# Patient Record
Sex: MALE | Race: WHITE | NOT HISPANIC OR LATINO | Employment: UNEMPLOYED | URBAN - METROPOLITAN AREA
[De-identification: names, ages, dates, MRNs, and addresses within clinical notes are randomized per-mention and may not be internally consistent; named-entity substitution may affect disease eponyms.]

---

## 2021-04-12 ENCOUNTER — OFFICE VISIT (OUTPATIENT)
Dept: INTERNAL MEDICINE CLINIC | Facility: CLINIC | Age: 21
End: 2021-04-12
Payer: COMMERCIAL

## 2021-04-12 VITALS
HEIGHT: 68 IN | BODY MASS INDEX: 23.28 KG/M2 | OXYGEN SATURATION: 96 % | TEMPERATURE: 99.2 F | SYSTOLIC BLOOD PRESSURE: 120 MMHG | WEIGHT: 153.6 LBS | HEART RATE: 97 BPM | DIASTOLIC BLOOD PRESSURE: 80 MMHG

## 2021-04-12 DIAGNOSIS — G44.219 EPISODIC TENSION-TYPE HEADACHE, NOT INTRACTABLE: Primary | ICD-10-CM

## 2021-04-12 DIAGNOSIS — R51.9 SHARP HEADACHE: ICD-10-CM

## 2021-04-12 DIAGNOSIS — R41.89 BRAIN FOG: ICD-10-CM

## 2021-04-12 PROCEDURE — 99203 OFFICE O/P NEW LOW 30 MIN: CPT | Performed by: NURSE PRACTITIONER

## 2021-04-12 PROCEDURE — 3008F BODY MASS INDEX DOCD: CPT | Performed by: NURSE PRACTITIONER

## 2021-04-12 PROCEDURE — 3725F SCREEN DEPRESSION PERFORMED: CPT | Performed by: NURSE PRACTITIONER

## 2021-04-12 PROCEDURE — 1036F TOBACCO NON-USER: CPT | Performed by: NURSE PRACTITIONER

## 2021-04-12 RX ORDER — TRIAMCINOLONE ACETONIDE 5 MG/G
CREAM TOPICAL
COMMUNITY
Start: 2021-03-12

## 2021-04-12 NOTE — ASSESSMENT & PLAN NOTE
Advised to keep a headache diary  Ok to take tylenol as needed  Stay well hydrated, get adequate sleep, avoid excessive screen time, avoid caffeine, eat a healthy diet  [facility]ed [facility], labs were normal, awaiting TSH  Due to worsening headaches with new symptoms, recommend CT scan and referral to neurology

## 2021-04-12 NOTE — PROGRESS NOTES
Assessment/Plan:    Episodic tension-type headache, not intractable  Advised to keep a headache diary  Ok to take tylenol as needed  Stay well hydrated, get adequate sleep, avoid excessive screen time, avoid caffeine, eat a healthy diet  [facility]ed [facility], labs were normal, awaiting TSH  Due to worsening headaches with new symptoms, recommend CT scan and referral to neurology  Diagnoses and all orders for this visit:    Episodic tension-type headache, not intractable  -     Ambulatory referral to Neurology; Future  -     CT head wo contrast; Future    Sharp headache  -     Ambulatory referral to Neurology; Future  -     CT head wo contrast; Future    Brain fog  -     Ambulatory referral to Neurology; Future  -     CT head wo contrast; Future    Other orders  -     triamcinolone (KENALOG) 0 5 % cream; APPLY EXTERNALLY TO THE AFFECTED AREA TWICE DAILY FOR 7 DAYS  -     mupirocin (BACTROBAN) 2 % ointment          Subjective:      Patient ID: Silvia Aquion is a 24 y o  male  Here today to establish care  Melisa Sy is accompanied by his father  He reports having a pressure on the top of his head intermittently over the last 5 months  He feels something if 'off'  He gets a pressure that radiates to the back of his head  He gets an achy feeling in his shoulders  He sometimes gets a shooting sensation from the back of his head upward   The episodes last about a day or so but they seem to be getting worse  The pain is worse and they are occurring more often   The last episode was last week which lasted 4 days  He went to his pediatrician a few months ago who tested for covid which was negative  [facility]  He had labs done  They recommended seeing neurology and getting a CT scan which they ordered  He denies any vision changes, nausea, vomiting, photophobia, or speech/coordination issues             The following portions of the patient's history were reviewed and updated as appropriate: allergies, current medications, past family history, past medical history, past social history, past surgical history and problem list     Review of Systems   Constitutional: Negative for activity change, appetite change, fatigue and fever  HENT: Negative for congestion, postnasal drip, rhinorrhea, sinus pressure and sore throat  Eyes: Negative for photophobia and visual disturbance  Respiratory: Negative for cough and shortness of breath  Cardiovascular: Negative for chest pain and palpitations  Gastrointestinal: Negative for abdominal pain, diarrhea, nausea and vomiting  Genitourinary: Negative for difficulty urinating  Musculoskeletal: Positive for neck pain  Skin: Negative for rash  Neurological: Positive for headaches  Negative for dizziness, syncope, facial asymmetry, speech difficulty, weakness, light-headedness and numbness  Psychiatric/Behavioral: Negative for sleep disturbance  The patient is not nervous/anxious  Objective:      /80   Pulse 97   Temp 99 2 °F (37 3 °C)   Ht 5' 7 5" (1 715 m)   Wt 69 7 kg (153 lb 9 6 oz)   SpO2 96%   BMI 23 70 kg/m²          Physical Exam  Vitals signs reviewed  Constitutional:       Appearance: Normal appearance  HENT:      Head: Normocephalic and atraumatic  Right Ear: Tympanic membrane, ear canal and external ear normal       Left Ear: Tympanic membrane, ear canal and external ear normal    Eyes:      Conjunctiva/sclera: Conjunctivae normal       Pupils: Pupils are equal, round, and reactive to light  Neck:      Musculoskeletal: Neck supple  Thyroid: No thyromegaly  Cardiovascular:      Rate and Rhythm: Normal rate and regular rhythm  Heart sounds: Normal heart sounds  Pulmonary:      Effort: Pulmonary effort is normal       Breath sounds: Normal breath sounds  Musculoskeletal: Normal range of motion        Cervical back: He exhibits normal range of motion, no tenderness and no pain    Lymphadenopathy:      Cervical: No cervical adenopathy  Skin:     General: Skin is warm and dry  Neurological:      General: No focal deficit present  Mental Status: He is alert and oriented to person, place, and time  Cranial Nerves: No cranial nerve deficit  Coordination: Coordination normal    Psychiatric:         Mood and Affect: Mood normal          Behavior: Behavior normal        [facility] with patient and father   Labs were normal

## 2021-04-21 ENCOUNTER — TELEPHONE (OUTPATIENT)
Dept: INTERNAL MEDICINE CLINIC | Facility: CLINIC | Age: 21
End: 2021-04-21

## 2021-04-21 NOTE — TELEPHONE ENCOUNTER
Spoke with provider and patient advised of the following :     Advised to keep a headache diary  Ok to take tylenol as needed  Stay well hydrated, get adequate sleep, avoid excessive screen time, avoid caffeine, eat a healthy diet  Recommend to schedule with neurology

## 2021-04-21 NOTE — TELEPHONE ENCOUNTER
CT of Head w/o Contrast     This case cannot be approved based on clinical information received  Please upload additional clinical documents if available  If your physician would like a peer to peer consult, call [phone]  All clinical documentation we have has already been sent      Patients test is scheduled for tomorrow , please advise how to continue

## 2021-06-23 ENCOUNTER — OFFICE VISIT (OUTPATIENT)
Dept: INTERNAL MEDICINE CLINIC | Facility: CLINIC | Age: 21
End: 2021-06-23
Payer: COMMERCIAL

## 2021-06-23 VITALS
WEIGHT: 159.8 LBS | BODY MASS INDEX: 24.22 KG/M2 | SYSTOLIC BLOOD PRESSURE: 118 MMHG | DIASTOLIC BLOOD PRESSURE: 76 MMHG | OXYGEN SATURATION: 95 % | HEIGHT: 68 IN | HEART RATE: 85 BPM | TEMPERATURE: 97.4 F

## 2021-06-23 DIAGNOSIS — Z00.00 ANNUAL PHYSICAL EXAM: Primary | ICD-10-CM

## 2021-06-23 LAB — SEVERITY (EYE EXAM): NORMAL

## 2021-06-23 PROCEDURE — 3008F BODY MASS INDEX DOCD: CPT | Performed by: NURSE PRACTITIONER

## 2021-06-23 PROCEDURE — 99395 PREV VISIT EST AGE 18-39: CPT | Performed by: NURSE PRACTITIONER

## 2021-06-23 PROCEDURE — 1036F TOBACCO NON-USER: CPT | Performed by: NURSE PRACTITIONER

## 2021-06-23 NOTE — PROGRESS NOTES
[address] [facility] INTER[facility] MEDICINE    NAME: Kasandra Shabazz  AGE: 24 y o  SEX: male  : 2000     DATE: 2021     Assessment and Plan:     Problem List Items Addressed This Visit     None      Visit Diagnoses     Annual physical exam    -  Primary    Relevant Orders    Visual acuity screening          Immunizations and preventive care screenings were discussed with patient today  Appropriate education was printed on patient's after visit summary  Counseling:  Injury prevention: discussed safety/seat belts, safety helmets, smoke detectors, carbon dioxide detectors, and smoking near bedding or upholstery  · Exercise: the importance of regular exercise/physical activity was discussed  Recommend exercise 3-5 times per week for at least 30 minutes  Return in about 1 year (around 2022) for Annual physical      Chief Complaint:     Chief Complaint   Patient presents with    Annual Exam      History of Present Illness:     Adult Annual Physical   Patient here for a comprehensive physical exam  The patient reports no problems  Here for his drivers permit physical      Diet and Physical Activity  · Diet/Nutrition: well balanced diet  · Exercise: walking  Depression Screening  PHQ-9 Depression Screening    PHQ-9:   Frequency of the following problems over the past two weeks:           General Health  · Sleep: sleeps well  · Hearing: normal - none   · Vision: no vision problems  · Dental: regular dental visits  Review of Systems:     Review of Systems   Constitutional: Negative for activity change, appetite change, fatigue and unexpected weight change  Eyes: Negative for visual disturbance  Respiratory: Negative for cough, chest tightness and shortness of breath  Cardiovascular: Negative for chest pain, palpitations and leg swelling  Gastrointestinal: Negative for abdominal pain, constipation and diarrhea  Genitourinary: Negative for difficulty urinating  Musculoskeletal: Negative for arthralgias  Skin: Negative for rash  Allergic/Immunologic: Negative for environmental allergies  Neurological: Negative for dizziness, weakness, light-headedness and headaches  Psychiatric/Behavioral: Negative for sleep disturbance  Past Medical History:     Past Medical History:   Diagnosis Date    Concussion 03/24/2015    Fatigue 02/23/2017    GERD (gastroesophageal reflux disease)     as a child    Gynecomastia 11/10/2017    Hiatal hernia     as a child    [facility] 02/23/2017      Past Surgical History:     History reviewed  No pertinent surgical history  Social History:     Social History     Socioeconomic History    Marital status: Unknown     Spouse name: None    Number of children: None    Years of education: None    Highest education level: None   Occupational History    None   Tobacco Use    Smoking status: Never Smoker    Smokeless tobacco: Never Used    Tobacco comment: mother smoked outside of home   Vaping Use    Vaping Use: Never used   Substance and Sexual Activity    Alcohol use: Never    Drug use: None    Sexual activity: None   Other Topics Concern    None   Social History Narrative    None     Social Determinants of Health     Financial Resource Strain:     Difficulty of Paying Living Expenses:    Food Insecurity:     Worried About Running Out of Food in the Last Year:     Ran Out of Food in the Last Year:    Transportation Needs:     Lack of Transportation (Medical):      Lack of Transportation (Non-Medical):    Physical Activity:     Days of Exercise per Week:     Minutes of Exercise per Session:    Stress:     Feeling of Stress :    Social Connections:     Frequency of Communication with Friends and Family:     Frequency of Social Gatherings with Friends and Family:     Attends [employer] Services:     Active Member of Clubs or Organizations:     Attends Club or Organization Meetings:     Marital Status:    Intimate Partner Violence:     Fear of Current or Ex-Partner:     Emotionally Abused:     Physically Abused:     Sexually Abused:       Family History:     Family History   Problem Relation Age of Onset    Hypertension Father     Bipolar disorder Mother       Current Medications:     Current Outpatient Medications   Medication Sig Dispense Refill    mupirocin (BACTROBAN) 2 % ointment       triamcinolone (KENALOG) 0 5 % cream APPLY EXTERNALLY TO THE AFFECTED AREA TWICE DAILY FOR 7 DAYS       No current facility-administered medications for this visit  Allergies:     No Known Allergies   Physical Exam:     /76   Pulse 85   Temp (!) 97 4 °F (36 3 °C)   Ht 5' 7 5" (1 715 m)   Wt 72 5 kg (159 lb 12 8 oz)   SpO2 95%   BMI 24 66 kg/m²     Physical Exam  Vitals reviewed  Constitutional:       Appearance: He is well-developed  HENT:      Head: Normocephalic and atraumatic  Right Ear: Tympanic membrane, ear canal and external ear normal       Left Ear: Tympanic membrane, ear canal and external ear normal    Eyes:      Conjunctiva/sclera: Conjunctivae normal    Cardiovascular:      Rate and Rhythm: Normal rate and regular rhythm  Pulmonary:      Effort: Pulmonary effort is normal       Breath sounds: Normal breath sounds  Abdominal:      General: Bowel sounds are normal       Palpations: Abdomen is soft  Tenderness: There is no abdominal tenderness  Musculoskeletal:         General: No swelling  Normal range of motion  Cervical back: Neck supple  Skin:     General: Skin is warm and dry  Neurological:      Mental Status: He is alert and oriented to person, place, and time     Psychiatric:         Mood and Affect: Mood normal          Behavior: Behavior normal           [facility]

## 2021-11-23 ENCOUNTER — TELEPHONE (OUTPATIENT)
Dept: INTERNAL MEDICINE CLINIC | Facility: CLINIC | Age: 21
End: 2021-11-23

## 2021-12-14 ENCOUNTER — TELEPHONE (OUTPATIENT)
Dept: NEUROLOGY | Facility: CLINIC | Age: 21
End: 2021-12-14

## 2021-12-15 ENCOUNTER — CONSULT (OUTPATIENT)
Dept: NEUROLOGY | Facility: CLINIC | Age: 21
End: 2021-12-15
Payer: COMMERCIAL

## 2021-12-15 VITALS
HEART RATE: 70 BPM | BODY MASS INDEX: 20.95 KG/M2 | HEIGHT: 68 IN | TEMPERATURE: 98.2 F | DIASTOLIC BLOOD PRESSURE: 86 MMHG | WEIGHT: 138.2 LBS | SYSTOLIC BLOOD PRESSURE: 130 MMHG

## 2021-12-15 DIAGNOSIS — R07.0 THROAT PAIN: ICD-10-CM

## 2021-12-15 DIAGNOSIS — M54.2 NECK PAIN: Primary | ICD-10-CM

## 2021-12-15 PROCEDURE — 1036F TOBACCO NON-USER: CPT | Performed by: PSYCHIATRY & NEUROLOGY

## 2021-12-15 PROCEDURE — 3008F BODY MASS INDEX DOCD: CPT | Performed by: PSYCHIATRY & NEUROLOGY

## 2021-12-15 PROCEDURE — 99244 OFF/OP CNSLTJ NEW/EST MOD 40: CPT | Performed by: PSYCHIATRY & NEUROLOGY

## 2022-01-03 ENCOUNTER — HOSPITAL ENCOUNTER (OUTPATIENT)
Dept: ULTRASOUND IMAGING | Facility: HOSPITAL | Age: 22
Discharge: HOME | End: 2022-01-03
Payer: COMMERCIAL

## 2022-01-03 DIAGNOSIS — R22.1 LUMP IN NECK: ICD-10-CM

## 2022-01-03 PROCEDURE — 76536 US EXAM OF HEAD AND NECK: CPT

## 2022-01-07 ENCOUNTER — APPOINTMENT (EMERGENCY)
Dept: RADIOLOGY | Facility: HOSPITAL | Age: 22
End: 2022-01-07
Payer: COMMERCIAL

## 2022-01-07 ENCOUNTER — HOSPITAL ENCOUNTER (EMERGENCY)
Facility: HOSPITAL | Age: 22
Discharge: HOME | End: 2022-01-07
Attending: EMERGENCY MEDICINE
Payer: COMMERCIAL

## 2022-01-07 VITALS
SYSTOLIC BLOOD PRESSURE: 125 MMHG | OXYGEN SATURATION: 96 % | HEART RATE: 122 BPM | TEMPERATURE: 100.4 F | DIASTOLIC BLOOD PRESSURE: 73 MMHG | RESPIRATION RATE: 20 BRPM

## 2022-01-07 DIAGNOSIS — U07.1 COVID-19 VIRUS INFECTION: ICD-10-CM

## 2022-01-07 DIAGNOSIS — R50.9 FEVER: ICD-10-CM

## 2022-01-07 DIAGNOSIS — T78.3XXA ANGIOEDEMA OF LIPS: Primary | ICD-10-CM

## 2022-01-07 LAB
ALBUMIN SERPL BCP-MCNC: 3.9 G/DL (ref 3.5–5)
ALP SERPL-CCNC: 55 U/L (ref 46–116)
ALT SERPL W P-5'-P-CCNC: 19 U/L (ref 12–78)
ANION GAP SERPL CALCULATED.3IONS-SCNC: 12 MMOL/L (ref 4–13)
AST SERPL W P-5'-P-CCNC: 18 U/L (ref 5–45)
ATRIAL RATE: 107 BPM
BASOPHILS # BLD AUTO: 0.03 THOUSANDS/ΜL (ref 0–0.1)
BASOPHILS NFR BLD AUTO: 0 % (ref 0–1)
BILIRUB SERPL-MCNC: 0.61 MG/DL (ref 0.2–1)
BUN SERPL-MCNC: 20 MG/DL (ref 5–25)
CALCIUM SERPL-MCNC: 9 MG/DL (ref 8.3–10.1)
CHLORIDE SERPL-SCNC: 106 MMOL/L (ref 100–108)
CO2 SERPL-SCNC: 22 MMOL/L (ref 21–32)
CREAT SERPL-MCNC: 1.18 MG/DL (ref 0.6–1.3)
EOSINOPHIL # BLD AUTO: 0.1 THOUSAND/ΜL (ref 0–0.61)
EOSINOPHIL NFR BLD AUTO: 1 % (ref 0–6)
ERYTHROCYTE [DISTWIDTH] IN BLOOD BY AUTOMATED COUNT: 10.7 % (ref 11.6–15.1)
FLUAV RNA RESP QL NAA+PROBE: NEGATIVE
FLUBV RNA RESP QL NAA+PROBE: NEGATIVE
GFR SERPL CREATININE-BSD FRML MDRD: 88 ML/MIN/1.73SQ M
GLUCOSE SERPL-MCNC: 97 MG/DL (ref 65–140)
HCT VFR BLD AUTO: 44.9 % (ref 36.5–49.3)
HGB BLD-MCNC: 15.2 G/DL (ref 12–17)
IMM GRANULOCYTES # BLD AUTO: 0.04 THOUSAND/UL (ref 0–0.2)
IMM GRANULOCYTES NFR BLD AUTO: 1 % (ref 0–2)
LACTATE SERPL-SCNC: 1.7 MMOL/L (ref 0.5–2)
LYMPHOCYTES # BLD AUTO: 0.41 THOUSANDS/ΜL (ref 0.6–4.47)
LYMPHOCYTES NFR BLD AUTO: 5 % (ref 14–44)
MCH RBC QN AUTO: 31.5 PG (ref 26.8–34.3)
MCHC RBC AUTO-ENTMCNC: 33.9 G/DL (ref 31.4–37.4)
MCV RBC AUTO: 93 FL (ref 82–98)
MONOCYTES # BLD AUTO: 0.98 THOUSAND/ΜL (ref 0.17–1.22)
MONOCYTES NFR BLD AUTO: 12 % (ref 4–12)
NEUTROPHILS # BLD AUTO: 6.87 THOUSANDS/ΜL (ref 1.85–7.62)
NEUTS SEG NFR BLD AUTO: 81 % (ref 43–75)
NRBC BLD AUTO-RTO: 0 /100 WBCS
P AXIS: 65 DEGREES
PLATELET # BLD AUTO: 264 THOUSANDS/UL (ref 149–390)
PMV BLD AUTO: 8.1 FL (ref 8.9–12.7)
POTASSIUM SERPL-SCNC: 3.2 MMOL/L (ref 3.5–5.3)
PR INTERVAL: 130 MS
PROT SERPL-MCNC: 7.2 G/DL (ref 6.4–8.2)
QRS AXIS: 87 DEGREES
QRSD INTERVAL: 70 MS
QT INTERVAL: 304 MS
QTC INTERVAL: 405 MS
RBC # BLD AUTO: 4.83 MILLION/UL (ref 3.88–5.62)
RSV RNA RESP QL NAA+PROBE: NEGATIVE
SARS-COV-2 RNA RESP QL NAA+PROBE: POSITIVE
SODIUM SERPL-SCNC: 140 MMOL/L (ref 136–145)
T WAVE AXIS: 58 DEGREES
VENTRICULAR RATE: 107 BPM
WBC # BLD AUTO: 8.43 THOUSAND/UL (ref 4.31–10.16)

## 2022-01-07 PROCEDURE — 99284 EMERGENCY DEPT VISIT MOD MDM: CPT

## 2022-01-07 PROCEDURE — 80053 COMPREHEN METABOLIC PANEL: CPT

## 2022-01-07 PROCEDURE — 93005 ELECTROCARDIOGRAM TRACING: CPT

## 2022-01-07 PROCEDURE — 36415 COLL VENOUS BLD VENIPUNCTURE: CPT

## 2022-01-07 PROCEDURE — 87040 BLOOD CULTURE FOR BACTERIA: CPT

## 2022-01-07 PROCEDURE — 96375 TX/PRO/DX INJ NEW DRUG ADDON: CPT

## 2022-01-07 PROCEDURE — 96361 HYDRATE IV INFUSION ADD-ON: CPT

## 2022-01-07 PROCEDURE — 71045 X-RAY EXAM CHEST 1 VIEW: CPT

## 2022-01-07 PROCEDURE — 96374 THER/PROPH/DIAG INJ IV PUSH: CPT

## 2022-01-07 PROCEDURE — 99285 EMERGENCY DEPT VISIT HI MDM: CPT | Performed by: EMERGENCY MEDICINE

## 2022-01-07 PROCEDURE — 93010 ELECTROCARDIOGRAM REPORT: CPT | Performed by: INTERNAL MEDICINE

## 2022-01-07 PROCEDURE — 0241U HB NFCT DS VIR RESP RNA 4 TRGT: CPT

## 2022-01-07 PROCEDURE — 83605 ASSAY OF LACTIC ACID: CPT

## 2022-01-07 PROCEDURE — 85025 COMPLETE CBC W/AUTO DIFF WBC: CPT

## 2022-01-07 RX ORDER — MULTIVIT WITH MINERALS/LUTEIN
1000 TABLET ORAL 2 TIMES DAILY
Qty: 28 TABLET | Refills: 0 | Status: SHIPPED | OUTPATIENT
Start: 2022-01-07 | End: 2022-01-21

## 2022-01-07 RX ORDER — PREDNISONE 20 MG/1
60 TABLET ORAL DAILY
Qty: 15 TABLET | Refills: 0 | Status: SHIPPED | OUTPATIENT
Start: 2022-01-07 | End: 2022-01-12

## 2022-01-07 RX ORDER — POTASSIUM CHLORIDE 20 MEQ/1
20 TABLET, EXTENDED RELEASE ORAL ONCE
Status: COMPLETED | OUTPATIENT
Start: 2022-01-07 | End: 2022-01-07

## 2022-01-07 RX ORDER — MELATONIN
2000 DAILY
Qty: 14 TABLET | Refills: 0 | Status: SHIPPED | OUTPATIENT
Start: 2022-01-07 | End: 2022-01-21

## 2022-01-07 RX ORDER — METHYLPREDNISOLONE SODIUM SUCCINATE 125 MG/2ML
125 INJECTION, POWDER, LYOPHILIZED, FOR SOLUTION INTRAMUSCULAR; INTRAVENOUS ONCE
Status: COMPLETED | OUTPATIENT
Start: 2022-01-07 | End: 2022-01-07

## 2022-01-07 RX ORDER — HYDROXYZINE HYDROCHLORIDE 25 MG/1
25 TABLET, FILM COATED ORAL EVERY 6 HOURS PRN
Qty: 25 TABLET | Refills: 0 | Status: SHIPPED | OUTPATIENT
Start: 2022-01-07 | End: 2022-01-14

## 2022-01-07 RX ORDER — ZINC SULFATE 50(220)MG
220 CAPSULE ORAL DAILY
Qty: 14 CAPSULE | Refills: 0 | Status: SHIPPED | OUTPATIENT
Start: 2022-01-07 | End: 2022-04-28

## 2022-01-07 RX ORDER — EPINEPHRINE 0.3 MG/.3ML
0.1 INJECTION SUBCUTANEOUS ONCE
Qty: 0.2 ML | Refills: 1 | Status: SHIPPED | OUTPATIENT
Start: 2022-01-07 | End: 2022-04-28

## 2022-01-07 RX ORDER — FAMOTIDINE 20 MG/1
20 TABLET, FILM COATED ORAL 2 TIMES DAILY
Qty: 10 TABLET | Refills: 0 | Status: SHIPPED | OUTPATIENT
Start: 2022-01-07 | End: 2022-01-12

## 2022-01-07 RX ORDER — DIPHENHYDRAMINE HYDROCHLORIDE 50 MG/ML
25 INJECTION INTRAMUSCULAR; INTRAVENOUS ONCE
Status: COMPLETED | OUTPATIENT
Start: 2022-01-07 | End: 2022-01-07

## 2022-01-07 RX ADMIN — DIPHENHYDRAMINE HYDROCHLORIDE 25 MG: 50 INJECTION, SOLUTION INTRAMUSCULAR; INTRAVENOUS at 07:07

## 2022-01-07 RX ADMIN — SODIUM CHLORIDE 1000 ML: 0.9 INJECTION, SOLUTION INTRAVENOUS at 07:59

## 2022-01-07 RX ADMIN — METHYLPREDNISOLONE SODIUM SUCCINATE 125 MG: 125 INJECTION, POWDER, FOR SOLUTION INTRAMUSCULAR; INTRAVENOUS at 07:03

## 2022-01-07 RX ADMIN — POTASSIUM CHLORIDE 20 MEQ: 1500 TABLET, EXTENDED RELEASE ORAL at 09:17

## 2022-01-07 RX ADMIN — FAMOTIDINE 20 MG: 10 INJECTION, SOLUTION INTRAVENOUS at 07:06

## 2022-01-07 NOTE — SEPSIS NOTE
Sepsis Note   Hilda Robison  22 y o  male MRN: [id number]  Unit/Bed#: ED 16 Encounter: [id number]       qSOFA     [address] Name 01/07/22 0926                Altered mental status GCS < 15 --        Respiratory Rate > / =47 0        Systolic BP < / =883 0        Q Sofa Score 0                   Initial Sepsis Screening     Row Name 01/07/22 0746                Is the patient's history suggestive of a new or worsening infection? --        Suspected source of infection --        Are two or more of the following signs & symptoms of infection both present and new to the patient? --        Indicate SIRS criteria Tachycardia > 90 bpm  -KF        If the answer is yes to both questions, suspicion of sepsis is present --        If severe sepsis is present AND tissue hypoperfusion perists in the hour after fluid resuscitation or lactate > 4, the patient meets criteria for SEPTIC SHOCK --        Are any of the following organ dysfunction criteria present within 6 hours of suspected infection and SIRS criteria that are NOT considered to be chronic conditions?  --        Organ dysfunction --        Date of presentation of severe sepsis --        Time of presentation of severe sepsis --        Tissue hypoperfusion persists in the hour after crystalloid fluid administration, evidenced, by either: --        Was hypotension present within one hour of the conclusion of crystalloid fluid administration? --        Date of presentation of septic shock --        Time of presentation of septic shock --              User Key  (r) = Recorded By, (t) = Taken By, (c) = Cosigned By    [address] Name Provider Ranjeet Vale MD Resident

## 2022-01-07 NOTE — ED PROVIDER NOTES
History  Chief Complaint   Patient presents with    Lip Swelling     pt took aspirin 2 hours ago and around 30-40 minutes ago started with lip swelling  denies throat complaints/SOB  dad did give 50 mg oral benadryl  took aspiring because he states he had high fevera t home-- thinks he may have covid     HPI    Prior to Admission Medications   Prescriptions Last Dose Informant Patient Reported? Taking?   hydrocortisone 0 5 % cream  Self Yes No   Sig: Apply 1 application topically 2 (two) times a day   mupirocin (BACTROBAN) 2 % ointment   Yes No   triamcinolone (KENALOG) 0 5 % cream   Yes No   Sig: APPLY EXTERNALLY TO THE AFFECTED AREA TWICE DAILY FOR 7 DAYS      Facility-Administered Medications: None       Past Medical History:   Diagnosis Date    Concussion 03/24/2015    Fatigue 02/23/2017    GERD (gastroesophageal reflux disease)     as a child    Gynecomastia 11/10/2017    Hiatal hernia     as a child    Mild depression ([facility] [address] ) 02/23/2017       History reviewed  No pertinent surgical history  Family History   Problem Relation Age of Onset    Hypertension Father     Bipolar disorder Mother      I have reviewed and agree with the history as documented      E-Cigarette/Vaping    E-Cigarette Use Never User      E-Cigarette/Vaping Substances     Social History     Tobacco Use    Smoking status: Never Smoker    Smokeless tobacco: Never Used    Tobacco comment: mother smoked outside of home   Vaping Use    Vaping Use: Never used   Substance Use Topics    Alcohol use: Never    Drug use: Not on file       Review of Systems    Physical Exam  Physical Exam    Vital Signs  ED Triage Vitals   Temperature Pulse Respirations Blood Pressure SpO2   01/07/22 0640 01/07/22 0637 01/07/22 0638 01/07/22 0638 01/07/22 0638   100 4 °F (38 °C) (!) 122 20 125/73 96 %      Temp Source Heart Rate Source Patient Position - Orthostatic VS BP Location FiO2 (%)   01/07/22 0499 01/07/22 6747 01/07/22 5852 01/07/22 5758 --   Oral Monitor Sitting Right arm       Pain Score       --                  Vitals:    01/07/22 0637 01/07/22 0638   BP:  125/73   Pulse: (!) 122 (!) 122   Patient Position - Orthostatic VS:  Sitting         Visual Acuity      ED Medications  Medications   sodium chloride 0 9 % bolus 1,000 mL (1,000 mL Intravenous New Bag 1/7/22 0751)   potassium chloride (K-DUR,KLOR-CON) CR tablet 20 mEq (has no administration in time range)   famotidine (PEPCID) injection 20 mg (20 mg Intravenous Given 1/7/22 0706)   diphenhydrAMINE (BENADRYL) injection 25 mg (25 mg Intravenous Given 1/7/22 0707)   methylPREDNISolone sodium succinate (Solu-MEDROL) injection 125 mg (125 mg Intravenous Given 1/7/22 0703)       Diagnostic Studies  Results Reviewed     Procedure Component Value Units Date/Time    COVID/FLU/RSV - 2 hour TAT [[id number]]  (Abnormal) Collected: 01/07/22 0709    Lab Status: Final result Specimen: Nares from Nose Updated: 01/07/22 0814     SARS-CoV-2 Positive     INFLUENZA A PCR Negative     INFLUENZA B PCR Negative     RSV PCR Negative    Narrative:      FOR PEDIATRIC PATIENTS - copy/paste COVID Guidelines URL to browser: [email or web address] [email or web address]  [email or web address]    SARS-CoV-2 assay is a Nucleic Acid Amplification assay intended for the  qualitative detection of nucleic acid from SARS-CoV-2 in nasopharyngeal  swabs  Results are for the presumptive identification of SARS-CoV-2 RNA  Positive results are indicative of infection with SARS-CoV-2, the virus  causing COVID-19, but do not rule out bacterial infection or co-infection  with other viruses  Laboratories within the [place] and its  territories are required to report all positive results to the appropriate  public health authorities  Negative results do not preclude SARS-CoV-2  infection and should not be used as the sole basis for treatment or other  patient management decisions   Negative results must be combined with  clinical observations, patient history, and epidemiological information  This test has not been FDA cleared or approved  This test has been authorized by FDA under an Emergency Use Authorization  (EUA)  This test is only authorized for the duration of time the  declaration that circumstances exist justifying the authorization of the  emergency use of an in vitro diagnostic tests for detection of SARS-CoV-2  virus and/or diagnosis of COVID-19 infection under section 564(b)(1) of  the Act, 21 U  S C  029TWO-1(C)(1), unless the authorization is terminated  or revoked sooner  The test has been validated but independent review by FDA  and CLIA is pending  Test performed using [employer] Gene[employer]pert: This RT-PCR assay targets N2,  a region unique to SARS-CoV-2  A conserved region in the E-gene was chosen  for pan-Sarbecovirus detection which includes SARS-CoV-2      Comprehensive metabolic panel [[id number]]  (Abnormal) Collected: 01/07/22 0709    Lab Status: Final result Specimen: Blood from Arm, Right Updated: 01/07/22 0809     Sodium 140 mmol/L      Potassium 3 2 mmol/L      Chloride 106 mmol/L      CO2 22 mmol/L      ANION GAP 12 mmol/L      BUN 20 mg/dL      Creatinine 1 18 mg/dL      Glucose 97 mg/dL      Calcium 9 0 mg/dL      AST 18 U/L      ALT 19 U/L      Alkaline Phosphatase 55 U/L      Total Protein 7 2 g/dL      Albumin 3 9 g/dL      Total Bilirubin 0 61 mg/dL      eGFR 88 ml/min/1 73sq m     Narrative:      Krystal guidelines for Chronic Kidney Disease (CKD):     Stage 1 with normal or high GFR (GFR > 90 mL/min/1 73 square meters)    Stage 2 Mild CKD (GFR = 60-89 mL/min/1 73 square meters)    Stage 3A Moderate CKD (GFR = 45-59 mL/min/1 73 square meters)    Stage 3B Moderate CKD (GFR = 30-44 mL/min/1 73 square meters)    Stage 4 Severe CKD (GFR = 15-29 mL/min/1 73 square meters)    Stage 5 End Stage CKD (GFR <15 mL/min/1 73 square meters)  Note: GFR calculation is accurate only with a steady state creatinine    Lactic acid [[id number]]  (Normal) Collected: 01/07/22 0709    Lab Status: Final result Specimen: Blood from Arm, Right Updated: 01/07/22 0743     LACTIC ACID 1 7 mmol/L     Narrative:      Result may be elevated if tourniquet was used during collection  CBC and differential [[id number]]  (Abnormal) Collected: 01/07/22 0709    Lab Status: Final result Specimen: Blood from Arm, Right Updated: 01/07/22 0730     WBC 8 43 Thousand/uL      RBC 4 83 Million/uL      Hemoglobin 15 2 g/dL      Hematocrit 44 9 %      MCV 93 fL      MCH 31 5 pg      MCHC 33 9 g/dL      RDW 10 7 %      MPV 8 1 fL      Platelets 920 Thousands/uL      nRBC 0 /100 WBCs      Neutrophils Relative 81 %      Immat GRANS % 1 %      Lymphocytes Relative 5 %      Monocytes Relative 12 %      Eosinophils Relative 1 %      Basophils Relative 0 %      Neutrophils Absolute 6 87 Thousands/µL      Immature Grans Absolute 0 04 Thousand/uL      Lymphocytes Absolute 0 41 Thousands/µL      Monocytes Absolute 0 98 Thousand/µL      Eosinophils Absolute 0 10 Thousand/µL      Basophils Absolute 0 03 Thousands/µL     Blood culture #1 [[id number]] Collected: 01/07/22 0725    Lab Status: In process Specimen: Blood from Arm, Right Updated: 01/07/22 0728    Blood culture #2 [[id number]] Collected: 01/07/22 0709    Lab Status: In process Specimen: Blood from Arm, Right Updated: 01/07/22 0720                 XR chest 1 view portable   ED Interpretation by Marielos Salas MD (01/07 5620)   No acute cardiopulmonary disease  Final Result by Patricio Tucker MD (01/07 0815)      No acute cardiopulmonary disease  Findings concur with the preliminary report by the referring clinician already in PACS and/or our electronic record EPIC              Workstation performed: [id number]                    Procedures  Procedures         ED Course                            Initial Sepsis Screening     Row Name 01/07/22 0746                Is the patient's history suggestive of a new or worsening infection? --        Suspected source of infection --        Are two or more of the following signs & symptoms of infection both present and new to the patient? --        Indicate SIRS criteria Tachycardia > 90 bpm  -KF        If the answer is yes to both questions, suspicion of sepsis is present --        If severe sepsis is present AND tissue hypoperfusion perists in the hour after fluid resuscitation or lactate > 4, the patient meets criteria for SEPTIC SHOCK --        Are any of the following organ dysfunction criteria present within 6 hours of suspected infection and SIRS criteria that are NOT considered to be chronic conditions? --        Organ dysfunction --        Date of presentation of severe sepsis --        Time of presentation of severe sepsis --        Tissue hypoperfusion persists in the hour after crystalloid fluid administration, evidenced, by either: --        Was hypotension present within one hour of the conclusion of crystalloid fluid administration? --        Date of presentation of septic shock --        Time of presentation of septic shock --              User Key  (r) = Recorded By, (t) = Taken By, (c) = Cosigned By    Initials Name Provider Type    DANIE Welch MD Resident                              MDM    Disposition  Final diagnoses:   Angioedema of lips   Fever   COVID-19 virus infection     Time reflects when diagnosis was documented in both MDM as applicable and the Disposition within this note     Time User Action Codes Description Comment    1/7/2022  7:43 AM Jyoti Plater Add [T78  3XXA] Angioedema of lips, initial encounter     1/7/2022  7:44 AM Jyoti Plater Remove [T78  3XXA] Angioedema of lips, initial encounter     1/7/2022  7:44 AM Jyoti Plater Add [T78  3XXA] Angioedema of lips     1/7/2022  7:44 AM Jyoti Plater Add [R50 9] Fever     1/7/2022  8:22 AM Curtis Littlejohn Add [U07 1] COVID-19 virus infection ED Disposition     ED Disposition Condition Date/Time Comment    Discharge Stable Fri Jan 7, 2022  8:22 AM Luiz Howard  discharge to home/self care  Follow-up Information     Follow up With Specialties Details Why [address], [address] [facility], Nurse Practitioner Call today No aspirin or ibuprofen or aleve use  tylenol for pain, fever  Return sooner persistent fever, vomiting, difficulty breathing, trouble swallowing, increased swelling  Self isolation  Whit Lopez[address]  [phone]            Patient's Medications   Discharge Prescriptions    ASCORBIC ACID (VITAMIN C) 1000 MG TABLET    Take 1 tablet (1,000 mg total) by mouth 2 (two) times a day for 14 days       Start Date: 1/7/2022  End Date: 1/21/2022       Order Dose: 1,000 mg       Quantity: 28 tablet    Refills: 0    CHOLECALCIFEROL (VITAMIN D3) 1,000 UNITS TABLET    Take 2 tablets (2,000 Units total) by mouth daily for 14 days       Start Date: 1/7/2022  End Date: 1/21/2022       Order Dose: 2,000 Units       Quantity: 14 tablet    Refills: 0    EPINEPHRINE (EPIPEN) 0 3 MG/0 3 ML SOAJ    Inject 0 1 mL (0 1 mg total) into a muscle once for 1 dose As needed for severe allergic reaction for weight of 7 5-15 kg       Start Date: 1/7/2022  End Date: 1/7/2022       Order Dose: 0 1 mg       Quantity: 0 2 mL    Refills: 1    FAMOTIDINE (PEPCID) 20 MG TABLET    Take 1 tablet (20 mg total) by mouth 2 (two) times a day for 5 days       Start Date: 1/7/2022  End Date: 1/12/2022       Order Dose: 20 mg       Quantity: 10 tablet    Refills: 0    HYDROXYZINE HCL (ATARAX) 25 MG TABLET    Take 1 tablet (25 mg total) by mouth every 6 (six) hours as needed for itching for up to 7 days       Start Date: 1/7/2022  End Date: 1/14/2022       Order Dose: 25 mg       Quantity: 25 tablet    Refills: 0    PREDNISONE 20 MG TABLET    Take 3 tablets (60 mg total) by mouth daily for 5 days Take 3 tabs daily for 5 days  Start Date: 1/7/2022  End Date: 1/12/2022       Order Dose: 60 mg       Quantity: 15 tablet    Refills: 0    ZINC SULFATE (ZINCATE) 220 MG CAPSULE    Take 1 capsule (220 mg total) by mouth daily for 14 days       Start Date: 1/7/2022  End Date: 1/21/2022       Order Dose: 220 mg       Quantity: 14 capsule    Refills: 0       No discharge procedures on file      PDMP Review       Value Time User    PDMP Reviewed  Yes 1/7/2022  6:36 AM Nadia Goins MD          ED Provider  Electronically Signed by           Nadia Goins MD  01/07/22 5552

## 2022-01-07 NOTE — DISCHARGE INSTRUCTIONS
Your healthcare provider and public health staff will evaluate whether you can be cared for at home  If it is determined that you do not need to be hospitalized and can be isolated at home, you will be monitored by staff from your local or state health department  You should follow the prevention steps below until a healthcare provider or local or state health department says you can return to your normal activities  Stay home except to get medical care    People who are mildly ill with COVID-19 are able to isolate at home during their illness  You should restrict activities outside your home, except for getting medical care  Do not go to work, school, or public areas  Avoid using public transportation, ride-sharing, or taxis  Separate yourself from other people and animals in your home    People: As much as possible, you should stay in a specific room and away from other people in your home  Also, you should use a separate bathroom, if available  Animals: You should restrict contact with pets and other animals while you are sick with COVID-19, just like you would around other people  Although there have not been reports of pets or other animals becoming sick with COVID-19, it is still recommended that people sick with COVID-19 limit contact with animals until more information is known about the virus  When possible, have another member of your household care for your animals while you are sick  If you are sick with COVID-19, avoid contact with your pet, including petting, snuggling, being kissed or licked, and sharing food  If you must care for your pet or be around animals while you are sick, wash your hands before and after you interact with pets and wear a facemask  See COVID-19 and Animals for more information  Call ahead before visiting your doctor    If you have a medical appointment, call the healthcare provider and tell them that you have or may have COVID-19   This will help the healthcare providers office take steps to keep other people from getting infected or exposed  Wear a facemask    You should wear a facemask when you are around other people (e g , sharing a room or vehicle) or pets and before you enter a healthcare providers office  If you are not able to wear a facemask (for example, because it causes trouble breathing), then people who live with you should not stay in the same room with you, or they should wear a facemask if they enter your room  Cover your coughs and sneezes    Cover your mouth and nose with a tissue when you cough or sneeze  Throw used tissues in a lined trash can  Immediately wash your hands with soap and water for at least 20 seconds or, if soap and water are not available, clean your hands with an alcohol-based hand  that contains at least 60% alcohol  Clean your hands often    Wash your hands often with soap and water for at least 20 seconds, especially after blowing your nose, coughing, or sneezing; going to the bathroom; and before eating or preparing food  If soap and water are not readily available, use an alcohol-based hand  with at least 60% alcohol, covering all surfaces of your hands and rubbing them together until they feel dry  Soap and water are the best option if hands are visibly dirty  Avoid touching your eyes, nose, and mouth with unwashed hands  Avoid sharing personal household items    You should not share dishes, drinking glasses, cups, eating utensils, towels, or bedding with other people or pets in your home  After using these items, they should be washed thoroughly with soap and water  Clean all high-touch surfaces everyday    High touch surfaces include counters, tabletops, doorknobs, bathroom fixtures, toilets, phones, keyboards, tablets, and bedside tables  Also, clean any surfaces that may have blood, stool, or body fluids on them  Use a household cleaning spray or wipe, according to the label instructions  Labels contain instructions for safe and effective use of the cleaning product including precautions you should take when applying the product, such as wearing gloves and making sure you have good ventilation during use of the product  Monitor your symptoms    Seek prompt medical attention if your illness is worsening (e g , difficulty breathing)  Before seeking care, call your healthcare provider and tell them that you have, or are being evaluated for, COVID-19  Put on a facemask before you enter the facility  These steps will help the healthcare providers office to keep other people in the office or waiting room from getting infected or exposed  Ask your healthcare provider to call the local or state health department  Persons who are placed under active monitoring or facilitated self-monitoring should follow instructions provided by their local health department or occupational health professionals, as appropriate  If you have a medical emergency and need to call 911, notify the dispatch personnel that you have, or are being evaluated for COVID-19  If possible, put on a facemask before emergency medical services arrive  Discontinuing home isolation    Patients with confirmed COVID-19 should remain under home isolation precautions until the risk of secondary transmission to others is thought to be low  The decision to discontinue home isolation precautions should be made on a case-by-case basis, in consultation with healthcare providers and state and local health departments      Source: [employer]

## 2022-01-07 NOTE — ED PROVIDER NOTES
History  Chief Complaint   Patient presents with    Lip Swelling     pt took aspirin 2 hours ago and around 30-40 minutes ago started with lip swelling  denies throat complaints/SOB  dad did give 50 mg oral benadryl  took aspiring because he states he had high fevera t home-- thinks he may have covid     22year old male with no significant past medical history presents following allergic reaction  Patient states he took a baby aspirin 2 hours ago and about 30-40 minutes ago started with lip swelling  Dad gave 50 mg PO Benadryl at onset of symptoms  Dad believes swelling has improved since arriving at ED  Patient denies shortness of breath, wheezing, difficulty breathing, difficulty speaking or difficulty swallowing  Patient states he had 2 prior episodes of angioedema with unknown cause but states it possibly might be attributed to NSAID ingestion  Patient states he took the aspirin for fever up to 100 4 when he woke up this morning  Patient reports recent onset of cough, lightheadedness and rash and believes he may have COVID  Patient is not vaccinated for COVID  Patient reports intermittent hives that appear in the morning on his back for the past 5 days  Patient denies chest pain, nausea, vomiting, abdominal pain or any other symptoms at this time  Prior to Admission Medications   Prescriptions Last Dose Informant Patient Reported?  Taking?   hydrocortisone 0 5 % cream  Self Yes No   Sig: Apply 1 application topically 2 (two) times a day   mupirocin (BACTROBAN) 2 % ointment   Yes No   triamcinolone (KENALOG) 0 5 % cream   Yes No   Sig: APPLY EXTERNALLY TO THE AFFECTED AREA TWICE DAILY FOR 7 DAYS      Facility-Administered Medications: None       Past Medical History:   Diagnosis Date    Concussion 03/24/2015    Fatigue 02/23/2017    GERD (gastroesophageal reflux disease)     as a child    Gynecomastia 11/10/2017    Hiatal hernia     as a child    Mild depression ([facility] [address] ) 02/23/2017 History reviewed  No pertinent surgical history  Family History   Problem Relation Age of Onset    Hypertension Father     Bipolar disorder Mother      I have reviewed and agree with the history as documented  E-Cigarette/Vaping    E-Cigarette Use Never User      E-Cigarette/Vaping Substances     Social History     Tobacco Use    Smoking status: Never Smoker    Smokeless tobacco: Never Used    Tobacco comment: mother smoked outside of home   Vaping Use    Vaping Use: Never used   Substance Use Topics    Alcohol use: Never    Drug use: Not on file        Review of Systems   Constitutional: Negative for chills and fever  HENT: Positive for facial swelling  Negative for ear pain, sore throat and trouble swallowing  Eyes: Negative for pain and visual disturbance  Respiratory: Positive for cough  Negative for shortness of breath  Cardiovascular: Negative for chest pain and palpitations  Gastrointestinal: Negative for abdominal pain and vomiting  Genitourinary: Negative for dysuria and hematuria  Musculoskeletal: Negative for arthralgias and back pain  Skin: Positive for rash  Negative for color change  Neurological: Positive for light-headedness  Negative for seizures and syncope  All other systems reviewed and are negative  Physical Exam  ED Triage Vitals   Temperature Pulse Respirations Blood Pressure SpO2   01/07/22 0640 01/07/22 0637 01/07/22 0638 01/07/22 0638 01/07/22 0638   100 4 °F (38 °C) (!) 122 20 125/73 96 %      Temp Source Heart Rate Source Patient Position - Orthostatic VS BP Location FiO2 (%)   01/07/22 0640 01/07/22 0637 01/07/22 0638 01/07/22 0638 --   Oral Monitor Sitting Right arm       Pain Score       --                    Orthostatic Vital Signs  Vitals:    01/07/22 0637 01/07/22 0638   BP:  125/73   Pulse: (!) 122 (!) 122   Patient Position - Orthostatic VS:  Sitting       Physical Exam  Vitals and nursing note reviewed     Constitutional: General: He is not in acute distress  Appearance: Normal appearance  He is well-developed  HENT:      Head: Normocephalic and atraumatic  Comments: Angioedema to upper and lower lips with no intraoral swelling      Nose: Nose normal    Eyes:      Conjunctiva/sclera: Conjunctivae normal    Cardiovascular:      Rate and Rhythm: Regular rhythm  Tachycardia present  Heart sounds: No murmur heard  Pulmonary:      Effort: Pulmonary effort is normal  No respiratory distress  Breath sounds: Normal breath sounds  No wheezing  Abdominal:      Palpations: Abdomen is soft  Tenderness: There is no abdominal tenderness  Musculoskeletal:         General: Normal range of motion  Cervical back: Neck supple  Skin:     General: Skin is warm and dry  Findings: Rash (scattered urticaria on back) present  Neurological:      Mental Status: He is alert  ED Medications  Medications   sodium chloride 0 9 % bolus 1,000 mL (1,000 mL Intravenous New Bag 1/7/22 0759)   famotidine (PEPCID) injection 20 mg (20 mg Intravenous Given 1/7/22 0706)   diphenhydrAMINE (BENADRYL) injection 25 mg (25 mg Intravenous Given 1/7/22 0707)   methylPREDNISolone sodium succinate (Solu-MEDROL) injection 125 mg (125 mg Intravenous Given 1/7/22 0703)       Diagnostic Studies  Results Reviewed     Procedure Component Value Units Date/Time    Lactic acid [[id number]]  (Normal) Collected: 01/07/22 0709    Lab Status: Final result Specimen: Blood from Arm, Right Updated: 01/07/22 0743     LACTIC ACID 1 7 mmol/L     Narrative:      Result may be elevated if tourniquet was used during collection      CBC and differential [[id number]]  (Abnormal) Collected: 01/07/22 0709    Lab Status: Final result Specimen: Blood from Arm, Right Updated: 01/07/22 0730     WBC 8 43 Thousand/uL      RBC 4 83 Million/uL      Hemoglobin 15 2 g/dL      Hematocrit 44 9 %      MCV 93 fL      MCH 31 5 pg      MCHC 33 9 g/dL      RDW 10 7 % MPV 8 1 fL      Platelets 522 Thousands/uL      nRBC 0 /100 WBCs      Neutrophils Relative 81 %      Immat GRANS % 1 %      Lymphocytes Relative 5 %      Monocytes Relative 12 %      Eosinophils Relative 1 %      Basophils Relative 0 %      Neutrophils Absolute 6 87 Thousands/µL      Immature Grans Absolute 0 04 Thousand/uL      Lymphocytes Absolute 0 41 Thousands/µL      Monocytes Absolute 0 98 Thousand/µL      Eosinophils Absolute 0 10 Thousand/µL      Basophils Absolute 0 03 Thousands/µL     Blood culture #1 [[id number]] Collected: 01/07/22 0725    Lab Status: In process Specimen: Blood from Arm, Right Updated: 01/07/22 0728    Blood culture #2 [[id number]] Collected: 01/07/22 0709    Lab Status: In process Specimen: Blood from Arm, Right Updated: 01/07/22 0720    COVID/FLU/RSV - 2 hour TAT [[id number]] Collected: 01/07/22 0709    Lab Status: In process Specimen: Nares from Nose Updated: 01/07/22 0720    Comprehensive metabolic panel [[id number]] Collected: 01/07/22 0709    Lab Status: In process Specimen: Blood from Arm, Right Updated: 01/07/22 0720                 XR chest 1 view portable   ED Interpretation by Shauna Cardenas MD (01/07 6634)   No acute cardiopulmonary disease  Procedures  ECG 12 Lead Documentation Only    Date/Time: 1/7/2022 7:44 AM  Performed by: Shauna Cardenas MD  Authorized by: Shauna Cardenas MD     Indications / Diagnosis:  Angioedema, allergic rxn  ECG reviewed by me, the ED Provider: yes    Patient location:  ED  Previous ECG:     Previous ECG:  Unavailable    Comparison to cardiac monitor: Yes    Interpretation:     Interpretation: normal    Rate:     ECG rate:  107    ECG rate assessment: tachycardic    Rhythm:     Rhythm: sinus rhythm    Ectopy:     Ectopy: none    QRS:     QRS axis:  Normal    QRS intervals:  Normal  Conduction:     Conduction: normal    ST segments:     ST segments:  Normal  T waves:     T waves: normal    Comments:      Sinus tachycardia  Normal axis  Normal intervals  No ST-T wave abnormalities  ED Course  ED Course as of 01/07/22 0805   Candace Encarnacion Jan 07, 2022   4750 On frequent reassessments, patient still with angioedema but lungs and oropharynx remain clear  Patient continues to deny other any symptoms  Heart rate low 100s  Initial Sepsis Screening     Row Name 01/07/22 0746                Is the patient's history suggestive of a new or worsening infection? --        Suspected source of infection --        Are two or more of the following signs & symptoms of infection both present and new to the patient? --        Indicate SIRS criteria Tachycardia > 90 bpm  -KF        If the answer is yes to both questions, suspicion of sepsis is present --        If severe sepsis is present AND tissue hypoperfusion perists in the hour after fluid resuscitation or lactate > 4, the patient meets criteria for SEPTIC SHOCK --        Are any of the following organ dysfunction criteria present within 6 hours of suspected infection and SIRS criteria that are NOT considered to be chronic conditions? --        Organ dysfunction --        Date of presentation of severe sepsis --        Time of presentation of severe sepsis --        Tissue hypoperfusion persists in the hour after crystalloid fluid administration, evidenced, by either: --        Was hypotension present within one hour of the conclusion of crystalloid fluid administration? --        Date of presentation of septic shock --        Time of presentation of septic shock --              User Key  (r) = Recorded By, (t) = Taken By, (c) = Cosigned By    [address] Name Provider Jt Elise MD Resident                          MDM  Number of Diagnoses or Management Options  Angioedema of lips  Fever  Diagnosis management comments: 22year old male with no significant past medical history presents following allergic reaction   Patient states he took a baby aspirin 2 hours ago and about 30-40 minutes ago started with lip swelling  Dad gave 50 mg PO Benadryl at onset of symptoms  Patient denies shortness of breath, wheezing, difficulty breathing, difficulty speaking or difficulty swallowing  Patient states he had 2 prior episodes of angioedema with unknown cause but states it possibly might be attributed to NSAID ingestion  Patient also reports fever this AM with associated cough, lightheadedness and urticarial rash and believes he may have COVID  On exam, patient has angioedema to upper and lower lips with absolutely no signs of intraoral swelling  Patient was given steroids, Pepcid and additional benadryl on arrival to ED  Given that angioedema is his only symptoms with no signs of wheezing, difficulty breathing, chest pain, or any other symptoms at this time, epinephrine not indicated at this point  Patient continues to be frequently reassessed and the only remarkable finding continues to be angioedema  Lungs remain clear and oropharynx has no swelling or edema  Tachycardia improving  Patient is resting comfortably in bed  Patient signed out to attending, Dr Nathaniel Brown who will continue to monitor  Anticipate discharge if symptoms continue improving  Amount and/or Complexity of Data Reviewed  Clinical lab tests: ordered and reviewed  Tests in the radiology section of CPT®: ordered and reviewed  Obtain history from someone other than the patient: yes (dad)  Independent visualization of images, tracings, or specimens: yes    Risk of Complications, Morbidity, and/or Mortality  Presenting problems: high  Diagnostic procedures: low  Management options: moderate    Patient Progress  Patient progress: stable      Disposition  Final diagnoses:   Angioedema of lips   Fever     Time reflects when diagnosis was documented in both MDM as applicable and the Disposition within this note     Time User Action Codes Description Comment    1/7/2022  7:43 AM Adah Mohs Add [T78  3XXA] Angioedema of lips, initial encounter     1/7/2022  7:44 AM Selvin Vasquez Remove [T78  3XXA] Angioedema of lips, initial encounter     1/7/2022  7:44 AM Selvin Vasquez Add [T78  3XXA] Angioedema of lips     1/7/2022  7:44 AM Selvin Vasquez Add [R50 9] Fever       ED Disposition     None      Follow-up Information    None         Patient's Medications   Discharge Prescriptions    No medications on file     No discharge procedures on file  PDMP Review       Value Time User    PDMP Reviewed  Yes 1/7/2022  6:36 AM Anila Redmond MD           ED Provider  Attending physically available and evaluated Lindsey Mandujano    I managed the patient along with the ED Attending      Electronically Signed by         Little Lange MD  01/07/22 1987

## 2022-01-07 NOTE — ED ATTENDING ATTESTATION
1/7/2022  IStephanie MD, saw and evaluated the patient  I have discussed the patient with the resident/non-physician practitioner and agree with the resident's/non-physician practitioner's findings, Plan of Care, and MDM as documented in the resident's/non-physician practitioner's note, except where noted  All available labs and Radiology studies were reviewed  I was present for key portions of any procedure(s) performed by the resident/non-physician practitioner and I was immediately available to provide assistance  At this point I agree with the current assessment done in the Emergency Department  I have conducted an independent evaluation of this patient a history and physical is as follows: Patient is a 22year old male with fever this AM with cough and lightheadedness  No N/V/D  No travel  No covid vaccine  Got ASA about 2 hours ago and then had swelling of lips about 30 minutes ago  Has had prior episode possibly from NSAID in the past  Got benadryl 50 mg prior to arrival with some improvement as per patient  (+) hives on back for past few days  No sob  No urinary sx  No new foods, soaps, detergents or other medications  Angioedema of both lips  No intraoral swelling noted  Lungs clear  Tachycardia  No murmur  Abdomen soft and nontender with good bowel sounds  (+) urticaria noted of back and face  No LE edema  DDx including but not limited to: angioedema, allergic reaction; doubt anaphylaxis, airway compromise, cellulitis  DDx including but not limited to: viral illness, pneumonia, URI, pharyngitis, influenza, COVID-19 (novel coronavirus), sepsis; doubt UTI, meningitis, meningococcemia, sinusitis  More IV benadryl and IV steroid and IV pepcid to be given  Will check labs and EKG and CXR  I will add NSAID and ASA to patient's allergy list on Epic  I told patient and father that patient is never to get ASA or NSAID again       ED Course         Critical Care Time  Procedures

## 2022-01-07 NOTE — ED NOTES
Pt adamantly refusing to remove shirt and wear gown for physical assessment     Renetta Merchant RN  01/07/22 5264

## 2022-01-12 LAB
BACTERIA BLD CULT: NORMAL
BACTERIA BLD CULT: NORMAL

## 2022-02-03 ENCOUNTER — TELEPHONE (OUTPATIENT)
Dept: NEUROLOGY | Facility: CLINIC | Age: 22
End: 2022-02-03

## 2022-02-03 NOTE — TELEPHONE ENCOUNTER
Good Afternoon ABHI Diaz is requesting P2P for [facility] Cervical Spine MRI    Please call [phone] and use tracking # [id number]    Please document the name of the doctor you spoke with  Thank you,    [address]

## 2022-02-03 NOTE — TELEPHONE ENCOUNTER
Good Afternoon Dr Matilda Worthy,    A peer to peer has been requested for this patient's MRI C-Spine  Please take a look at your schedule and let me know when you have time to do it   Patient is scheduled for 02/05/2022

## 2022-02-04 NOTE — TELEPHONE ENCOUNTER
The nurse could not approve it, so you will have to call and we need to speak to her reviewer, you might want have the patient reschedule the MRI scan till we get peer to peer

## 2022-02-16 ENCOUNTER — TELEPHONE (OUTPATIENT)
Dept: INTERNAL MEDICINE CLINIC | Facility: CLINIC | Age: 22
End: 2022-02-16

## 2022-02-16 NOTE — TELEPHONE ENCOUNTER
Pt had US of head and neck on 1/3/22  He has not heard back about the results  His dad states they've been calling the doctor's office and their phone calls are not being  returned  Dad wants to know if you will give results? He is aware that you are out of the office until tomorrow

## 2022-02-17 NOTE — TELEPHONE ENCOUNTER
Called number on file ([phone]) pt's mom answered saying that the number belongs to her and we can give her results  Pt has only listed his father to have access to his medical records via [employer] [address] [employer] communication consent  Pt's mom insisted that she he does allow her to have his results which I then explained as per [facility] policy we cannot give his info/results based on the consent filled out and signed by the pt himself  Mother also not listed on additional contacts  Pt's dad aware

## 2022-03-02 ENCOUNTER — TELEPHONE (OUTPATIENT)
Dept: NEUROLOGY | Facility: CLINIC | Age: 22
End: 2022-03-02

## 2022-03-02 NOTE — TELEPHONE ENCOUNTER
Good Morning Mariaa Torres,    Follow up from 02/03/2022 notes:    He rescheduled his test to 03/05/2022      Good Afternoon ABHI Preston is requesting P2P for [facility] MRI     Please call [phone] and use tracking # [id number]     Please document the name of the doctor you spoke with      Thank you,     Olegario Vargas  Pre Cert Dept      Briseyda Ruelas  to Feli Goldstein MD    SP    3:21 PM  Note  Good Afternoon Dr Nena Boland,     A peer to peer has been requested for this patient's MRI C-Spine  Please take a look at your schedule and let me know when you have time to do it  Patient is scheduled for 02/05/2022          Feli Goldstein MD  to Briseyda Ruelas    RK    12:41 PM  Note  The nurse could not approve it, so you will have to call and we need to speak to her reviewer, you might want have the patient reschedule the MRI scan till we get peer to peer

## 2022-03-02 NOTE — TELEPHONE ENCOUNTER
Dr Nelson Lam,     This patient has rescheduled his MRI C-Spine for 03/05/2022 and it still needs a peer to peer  Please take a look at your schedule and let me know when you are available to do this

## 2022-03-18 ENCOUNTER — TELEPHONE (OUTPATIENT)
Dept: NEUROLOGY | Facility: CLINIC | Age: 22
End: 2022-03-18

## 2022-03-18 NOTE — TELEPHONE ENCOUNTER
Called patient and leave a message to confirm appointment for Tuesday March 22, 2022 with ALPHONSO Black

## 2022-03-28 ENCOUNTER — OFFICE VISIT (OUTPATIENT)
Dept: NEUROLOGY | Facility: CLINIC | Age: 22
End: 2022-03-28
Payer: COMMERCIAL

## 2022-03-28 VITALS
HEART RATE: 91 BPM | DIASTOLIC BLOOD PRESSURE: 84 MMHG | WEIGHT: 146 LBS | RESPIRATION RATE: 16 BRPM | SYSTOLIC BLOOD PRESSURE: 133 MMHG | BODY MASS INDEX: 21.62 KG/M2 | HEIGHT: 69 IN | TEMPERATURE: 97.1 F

## 2022-03-28 DIAGNOSIS — M54.2 NECK PAIN: Primary | ICD-10-CM

## 2022-03-28 DIAGNOSIS — M54.81 OCCIPITAL NEURALGIA OF LEFT SIDE: ICD-10-CM

## 2022-03-28 DIAGNOSIS — R21 RASH: ICD-10-CM

## 2022-03-28 PROCEDURE — 99213 OFFICE O/P EST LOW 20 MIN: CPT | Performed by: NURSE PRACTITIONER

## 2022-03-28 PROCEDURE — 1036F TOBACCO NON-USER: CPT | Performed by: NURSE PRACTITIONER

## 2022-03-28 PROCEDURE — 3008F BODY MASS INDEX DOCD: CPT | Performed by: NURSE PRACTITIONER

## 2022-03-28 RX ORDER — CYCLOBENZAPRINE HCL 10 MG
10 TABLET ORAL
Qty: 30 TABLET | Refills: 0 | Status: SHIPPED | OUTPATIENT
Start: 2022-03-28

## 2022-03-28 NOTE — PROGRESS NOTES
Patient ID: Ashvin Benton  is a 22 y o  male  Assessment/Plan:  Patient Instructions:  Ice/heat like we spoke about  Neck stretches  Flexeril at night x1 week and then as needed after  No need for MRI at this time  Follow up in 3 months    By assessment he seems to be having neck muscle spasm with occipital neuralgia component  Discussed topical heat/ice and flexeril to help  Flexeril also might help his anxiety/sleep problems slightly  He is encouraged to avoid marijuana use; he may benefit from future psychiatry consult if continued panic attacks  He will reach out sooner if new/worsening symptoms  Diagnoses and all orders for this visit:    Neck pain  Comments:  left trapezius  left occipital notch  Orders:  -     cyclobenzaprine (FLEXERIL) 10 mg tablet; Take 1 tablet (10 mg total) by mouth daily at bedtime as needed for muscle spasms    Occipital neuralgia of left side  -     cyclobenzaprine (FLEXERIL) 10 mg tablet; Take 1 tablet (10 mg total) by mouth daily at bedtime as needed for muscle spasms    Rash  Comments:  appears fungal bilateral armpit         Subjective:    JOHN  Romero Stevenson is a 22year old male with history of concussion (fall on ice 8 years ago), depression/anxiety, eczema, recent covid infection, GERD who presents for follow up of neck pain/head pains  He was last seen in December, labs ordered at that time were normal  MRI was ordered but denied by insurance  No significant family history of neurological disease  He did not graduate high school, he currently does not work  He has recently started exercising again/eating healthy again; he states he had fallen back into old habits of playing video games a lot before  He is not very social  He drinks a lot of water; he denies alcohol/drug use   He states he tried marijuana 8 months ago- was smoking for about a month but became paranoid and was having panic attacks- periods of time where he would hear voices saying "you have cancer" and he would have frequent dreams/nightmares  He states this has calmed down since stopping the marijuana but still feels his symptoms could be related to lymphoma  He states for the past 6-7 months he has been getting upper left neck pains and left sided occipital head pains and abnormal sensations that radiate to the temporal regions at times  Denies nausea, vomiting, photophobia  Symptoms are worse at night  He has not tried any medications for this  He states he was worked up by ENT and not found to have any abnormality  He also mentions "internal tremors" and sleep difficulty  Sometimes will lie awake for 4-5 hours at at time  The following portions of the patient's history were reviewed and updated as appropriate: allergies, current medications, past family history, past medical history, past social history, past surgical history and problem list          Objective:    Blood pressure 133/84, pulse 91, temperature (!) 97 1 °F (36 2 °C), temperature source Temporal, resp  rate 16, height 5' 9" (1 753 m), weight 66 2 kg (146 lb)  Physical Exam  Vitals reviewed  Constitutional:       General: He is not in acute distress  HENT:      Head: Normocephalic  Right Ear: External ear normal       Left Ear: External ear normal       Mouth/Throat:      Pharynx: Oropharynx is clear  Eyes:      General: Lids are normal       Extraocular Movements: Extraocular movements intact  Pupils: Pupils are equal, round, and reactive to light  Cardiovascular:      Rate and Rhythm: Normal rate and regular rhythm  Pulses: Normal pulses  Heart sounds: Normal heart sounds  Pulmonary:      Effort: Pulmonary effort is normal       Breath sounds: Normal breath sounds  Abdominal:      General: There is no distension  Musculoskeletal:      Cervical back: Tenderness present  Right lower leg: No edema  Left lower leg: No edema     Skin:     Capillary Refill: Capillary refill takes less than 2 seconds  Findings: Rash present  Comments: Eczema to hands/elbow  Fungal appearing rash to bilateral armpits   Neurological:      General: No focal deficit present  Mental Status: He is alert  Coordination: Romberg sign negative  Deep Tendon Reflexes: Strength normal and reflexes are normal and symmetric  Psychiatric:         Mood and Affect: Mood normal          Speech: Speech normal          Neurological Exam  Mental Status  Alert  Oriented to person, place and time  Speech is normal  Language is fluent with no aphasia  Cranial Nerves  CN II: Visual acuity is normal  Visual fields full to confrontation  CN III, IV, VI: Extraocular movements intact bilaterally  Normal lids and orbits bilaterally  Pupils equal round and reactive to light bilaterally  CN V: Facial sensation is normal   CN VII: Full and symmetric facial movement  CN VIII: Hearing is normal   CN IX, X: Palate elevates symmetrically  Normal gag reflex  CN XI: Shoulder shrug strength is normal   CN XII: Tongue midline without atrophy or fasciculations  Motor  Normal muscle bulk throughout  Strength is 5/5 throughout all four extremities  Sensory  Light touch is normal in upper and lower extremities  Reflexes  Deep tendon reflexes are 2+ and symmetric in all four extremities with downgoing toes bilaterally  Coordination  Right: Finger-to-nose normal   Left: Finger-to-nose normal     Gait  Casual gait is normal including stance, stride, and arm swing  Romberg is absent  Able to rise from chair without using arms  ROS:    Review of Systems   Constitutional: Positive for appetite change (Decreased)  Negative for fever  HENT: Positive for tinnitus  Negative for hearing loss, trouble swallowing and voice change  Eyes: Negative  Negative for photophobia and pain  Respiratory: Positive for shortness of breath  Cardiovascular: Negative  Negative for palpitations     Gastrointestinal: Negative  Negative for nausea and vomiting  Endocrine: Negative  Negative for cold intolerance  Genitourinary: Negative  Negative for dysuria, frequency and urgency  Musculoskeletal: Positive for neck pain  Negative for myalgias  Skin: Negative  Negative for rash  Neurological: Positive for tremors, light-headedness and headaches (Daily , (pressure, sharp))  Negative for dizziness, seizures, syncope, facial asymmetry, speech difficulty, weakness and numbness  Hematological: Negative  Does not bruise/bleed easily  Psychiatric/Behavioral: Positive for sleep disturbance  Negative for confusion and hallucinations     ROS was reviewed and updated as appropriate

## 2022-03-28 NOTE — PATIENT INSTRUCTIONS
Ice/heat like we spoke about  Neck stretches  Flexeril at night x1 week and then as needed after  No need for MRI at this time  Follow up in 3 months      Neck Exercises   AMBULATORY CARE:   Neck exercises  help reduce neck pain, and improve neck movement and strength  Neck exercises also help prevent long-term neck problems  What you need to know about neck exercises:   · Do the exercises every day,  or as often as directed by your healthcare provider  · Move slowly, gently, and smoothly  Avoid fast or jerky motions  · Stand and sit the way your healthcare provider shows you  Good posture may reduce your neck pain  Check your posture often, even when you are not doing your neck exercises  How to perform neck exercises safely:   · Exercise position:  You may sit or stand while you do neck exercises  Face forward  Your shoulders should be straight and relaxed, with a good posture  · Head tilts, forward and back:  Gently bow your head and try to touch your chin to your chest  Your healthcare provider may tell you to push on the back of your neck to help bow your head  Raise your chin back to the starting position  Tilt your head back as far as possible so you are looking up at the ceiling  Your healthcare provider may tell you to lift your chin to help tilt your head back  Return your head to the starting position  · Head tilts, side to side:  Tilt your head, bringing your ear toward your shoulder  Then tilt your head toward the other shoulder  · Head turns:  Turn your head to look over your shoulder  Tilt your chin down and try to touch it to your shoulder  Do not raise your shoulder to your chin  Face forward again  Do the same on the other side  · Head rolls:  Slowly bring your chin toward your chest  Next, roll your head to the right  Your ear should be positioned over your shoulder  Hold this position for 5 seconds   Roll your head back toward your chest and to the left into the same position  Hold for 5 seconds  Gently roll your head back and around in a clockwise [place] 3 times  Next, move your head in the reverse direction (counterclockwise) in a [place] 3 times  Do not shrug your shoulders upwards while you do this exercise  Contact your healthcare provider if:   · Your pain does not get better, or gets worse  · You have questions or concerns about your condition, care, or exercise program     © Copyright [employer] 2022 Information is for End User's use only and may not be sold, redistributed or otherwise used for commercial purposes  All illustrations and images included in CareNotes® are the copyrighted property of A D A M , Inc  or [facility] Erik Lamar   The above information is an  only  It is not intended as medical advice for individual conditions or treatments  Talk to your doctor, nurse or pharmacist before following any medical regimen to see if it is safe and effective for you

## 2022-06-16 ENCOUNTER — TELEPHONE (OUTPATIENT)
Dept: NEUROLOGY | Facility: CLINIC | Age: 22
End: 2022-06-16

## 2022-06-16 ENCOUNTER — TELEPHONE (OUTPATIENT)
Dept: INTERNAL MEDICINE CLINIC | Facility: CLINIC | Age: 22
End: 2022-06-16

## 2022-06-16 NOTE — TELEPHONE ENCOUNTER
Called and left a voicemail for patient - Please call back to confirm upcoming appointment with [facility]  Provided patient with apt date, time and location  Informed patient that check in is at least 15 minutes prior to apt time

## 2022-06-16 NOTE — TELEPHONE ENCOUNTER
Pt's dad wants to know what vaccination he is missing that would have been done when he was a teenager? It was mentioned at his last pediatrician visit prior to starting here, but they can't remember the name

## 2022-06-27 ENCOUNTER — OFFICE VISIT (OUTPATIENT)
Dept: INTERNAL MEDICINE CLINIC | Facility: CLINIC | Age: 22
End: 2022-06-27
Payer: COMMERCIAL

## 2022-06-27 VITALS
DIASTOLIC BLOOD PRESSURE: 64 MMHG | HEIGHT: 69 IN | HEART RATE: 58 BPM | OXYGEN SATURATION: 97 % | TEMPERATURE: 97.1 F | BODY MASS INDEX: 21.48 KG/M2 | WEIGHT: 145 LBS | SYSTOLIC BLOOD PRESSURE: 106 MMHG

## 2022-06-27 DIAGNOSIS — E87.6 HYPOKALEMIA: ICD-10-CM

## 2022-06-27 DIAGNOSIS — Z00.00 ANNUAL PHYSICAL EXAM: Primary | ICD-10-CM

## 2022-06-27 PROCEDURE — 1036F TOBACCO NON-USER: CPT | Performed by: NURSE PRACTITIONER

## 2022-06-27 PROCEDURE — 3008F BODY MASS INDEX DOCD: CPT | Performed by: NURSE PRACTITIONER

## 2022-06-27 PROCEDURE — 99395 PREV VISIT EST AGE 18-39: CPT | Performed by: NURSE PRACTITIONER

## 2022-06-27 PROCEDURE — 3725F SCREEN DEPRESSION PERFORMED: CPT | Performed by: NURSE PRACTITIONER

## 2022-06-27 NOTE — PROGRESS NOTES
[address] INTERNAL MEDICINE    NAME: Daphney Grover  AGE: 22 y o  SEX: male  : 2000     DATE: 2022     Assessment and Plan:     Problem List Items Addressed This Visit    None     Visit Diagnoses     Annual physical exam    -  Primary    Hypokalemia        Relevant Orders    Basic metabolic panel          Immunizations and preventive care screenings were discussed with patient today  Appropriate education was printed on patient's after visit summary  Depression Screening and Follow-up Plan: Patient was screened for depression during today's encounter  They screened negative with a PHQ-2 score of 0  Return in about 1 year (around 2023) for Annual physical      Chief Complaint:     Chief Complaint   Patient presents with    Annual Exam      History of Present Illness:     Adult Annual Physical   Patient here for a comprehensive physical exam  The patient reports no problems  Diet and Physical Activity  · Diet/Nutrition: well balanced diet  · Exercise: walking  Depression Screening  PHQ-2/9 Depression Screening    Little interest or pleasure in doing things: 0 - not at all  Feeling down, depressed, or hopeless: 0 - not at all  PHQ-2 Score: 0  PHQ-2 Interpretation: Negative depression screen       General Health  · Sleep: sleeps well  · Hearing: normal - none   · Vision: no vision problems  · Dental: regular dental visits  Review of Systems:     Review of Systems   Constitutional: Negative for activity change, appetite change, fatigue and unexpected weight change  Eyes: Negative for visual disturbance  Respiratory: Negative for cough, chest tightness and shortness of breath  Cardiovascular: Negative for chest pain, palpitations and leg swelling  Gastrointestinal: Negative for abdominal pain, blood in stool, constipation and diarrhea     Genitourinary: Negative for difficulty urinating  Musculoskeletal: Negative for arthralgias  Skin: Negative for rash  Neurological: Negative for dizziness, weakness, light-headedness and headaches  Psychiatric/Behavioral: Negative for sleep disturbance  Past Medical History:     Past Medical History:   Diagnosis Date    Concussion 03/24/2015    Fatigue 02/23/2017    GERD (gastroesophageal reflux disease)     as a child    Gynecomastia 11/10/2017    Hiatal hernia     as a child    Mild depression 02/23/2017      Past Surgical History:     History reviewed  No pertinent surgical history     Social History:     Social History     Socioeconomic History    Marital status: Single     Spouse name: None    Number of children: None    Years of education: None    Highest education level: None   Occupational History    None   Tobacco Use    Smoking status: Never Smoker    Smokeless tobacco: Never Used    Tobacco comment: mother smoked outside of home   Vaping Use    Vaping Use: Never used   Substance and Sexual Activity    Alcohol use: Never    Drug use: None    Sexual activity: None   Other Topics Concern    None   Social History Narrative    None     Social Determinants of Health     Financial Resource Strain: Not on file   Food Insecurity: Not on file   Transportation Needs: Not on file   Physical Activity: Not on file   Stress: Not on file   Social Connections: Not on file   Intimate Partner Violence: Not on file   Housing Stability: Not on file      Family History:     Family History   Problem Relation Age of Onset    Hypertension Father     Bipolar disorder Mother       Current Medications:     Current Outpatient Medications   Medication Sig Dispense Refill    Ascorbic Acid (vitamin C) 1000 MG tablet Take 1 tablet (1,000 mg total) by mouth 2 (two) times a day for 14 days 28 tablet 0    cholecalciferol (VITAMIN D3) 1,000 units tablet Take 2 tablets (2,000 Units total) by mouth daily for 14 days 14 tablet 0    cyclobenzaprine (FLEXERIL) 10 mg tablet Take 1 tablet (10 mg total) by mouth daily at bedtime as needed for muscle spasms 30 tablet 0    EPINEPHrine (EPIPEN) 0 3 mg/0 3 mL SOAJ Inject 0 1 mL (0 1 mg total) into a muscle once for 1 dose As needed for severe allergic reaction for weight of 7 5-15 kg 0 2 mL 1    famotidine (PEPCID) 20 mg tablet Take 1 tablet (20 mg total) by mouth 2 (two) times a day for 5 days 10 tablet 0    hydrocortisone 0 5 % cream Apply 1 application topically 2 (two) times a day      hydrOXYzine HCL (ATARAX) 25 mg tablet Take 1 tablet (25 mg total) by mouth every 6 (six) hours as needed for itching for up to 7 days 25 tablet 0    mupirocin (BACTROBAN) 2 % ointment       triamcinolone (KENALOG) 0 5 % cream APPLY EXTERNALLY TO THE AFFECTED AREA TWICE DAILY FOR 7 DAYS      zinc sulfate (ZINCATE) 220 mg capsule Take 1 capsule (220 mg total) by mouth daily for 14 days 14 capsule 0     No current facility-administered medications for this visit  Allergies: Allergies   Allergen Reactions    Aspirin Angioedema    Nsaids Hives      Physical Exam:     /64   Pulse 58   Temp (!) 97 1 °F (36 2 °C)   Ht 5' 9" (1 753 m)   Wt 65 8 kg (145 lb)   SpO2 97%   BMI 21 41 kg/m²     Physical Exam  Vitals reviewed  Constitutional:       Appearance: Normal appearance  HENT:      Head: Normocephalic and atraumatic  Right Ear: Tympanic membrane, ear canal and external ear normal       Left Ear: Tympanic membrane, ear canal and external ear normal    Eyes:      Conjunctiva/sclera: Conjunctivae normal    Cardiovascular:      Rate and Rhythm: Normal rate and regular rhythm  Heart sounds: Normal heart sounds  Pulmonary:      Effort: Pulmonary effort is normal       Breath sounds: Normal breath sounds  Abdominal:      General: Bowel sounds are normal       Palpations: Abdomen is soft  Musculoskeletal:         General: Normal range of motion  Cervical back: Neck supple  Right lower leg: No edema  Left lower leg: No edema  Skin:     General: Skin is warm and dry  Neurological:      Mental Status: He is alert and oriented to person, place, and time     Psychiatric:         Mood and Affect: Mood normal          Behavior: Behavior normal           [facility] [address] [facility]

## 2023-08-04 ENCOUNTER — OFFICE VISIT (OUTPATIENT)
Dept: INTERNAL MEDICINE CLINIC | Facility: CLINIC | Age: 23
End: 2023-08-04
Payer: COMMERCIAL

## 2023-08-04 VITALS
HEART RATE: 70 BPM | BODY MASS INDEX: 21.33 KG/M2 | WEIGHT: 144 LBS | HEIGHT: 69 IN | OXYGEN SATURATION: 98 % | SYSTOLIC BLOOD PRESSURE: 128 MMHG | DIASTOLIC BLOOD PRESSURE: 72 MMHG | TEMPERATURE: 95.9 F

## 2023-08-04 DIAGNOSIS — M54.50 CHRONIC BILATERAL LOW BACK PAIN WITHOUT SCIATICA: ICD-10-CM

## 2023-08-04 DIAGNOSIS — K59.00 CONSTIPATION, UNSPECIFIED CONSTIPATION TYPE: ICD-10-CM

## 2023-08-04 DIAGNOSIS — Z00.00 ANNUAL PHYSICAL EXAM: Primary | ICD-10-CM

## 2023-08-04 DIAGNOSIS — G89.29 CHRONIC BILATERAL LOW BACK PAIN WITHOUT SCIATICA: ICD-10-CM

## 2023-08-04 PROCEDURE — 99395 PREV VISIT EST AGE 18-39: CPT | Performed by: NURSE PRACTITIONER

## 2023-08-04 NOTE — PROGRESS NOTES
[address] [facility] INTER[facility] MED[facility]    NAME: Adam Delaney. AGE: 25 y.o. SEX: male  : 2000     DATE: 2023     Assessment and Plan:     Problem List Items Addressed This Visit    None  Visit Diagnoses     Annual physical exam    -  Primary    Chronic bilateral low back pain without sciatica        Relevant Orders    Ambulatory Referral to Physical Therapy    Constipation, unspecified constipation type        discussed high fiber diet, increase oral fluids. advised to call if not improving           Immunizations and preventive care screenings were discussed with patient today. Appropriate education was printed on patient's after visit summary. Return in about 1 year (around 2024) for Annual physical.     Chief Complaint:     Chief Complaint   Patient presents with   • Physical Exam      History of Present Illness:     Adult Annual Physical   Patient here for a comprehensive physical exam. The patient reports see below. Low back pain for 3-4 months. Worse with bending or walking long distances. The pain does not radiate. No numbness or tingling. Has not taken any OTC medications. Ongoing issues with constipation for 2 years. +straining, incomplete emptying. Some rectal bleeding- bright red blood on the toilet paper. No abdominal pain or blood in the stool. Diet and Physical Activity  · Diet/Nutrition: well balanced diet. · Exercise: 1-2 times a week on average. Depression Screening  PHQ-2/9 Depression Screening    Little interest or pleasure in doing things: 0 - not at all  Feeling down, depressed, or hopeless: 0 - not at all  PHQ-2 Score: 0  PHQ-2 Interpretation: Negative depression screen       General Health  · Sleep: sleeps well. · Hearing: normal - none . · Vision: no vision problems. · Dental: regular dental visits.         Review of Systems:     Review of Systems   Constitutional: Negative for activity change, appetite change, fatigue and unexpected weight change. Eyes: Negative for visual disturbance. Respiratory: Negative for cough and shortness of breath. Cardiovascular: Negative for chest pain, palpitations and leg swelling. Gastrointestinal: Positive for constipation. Negative for abdominal pain, blood in stool, diarrhea, nausea and vomiting. Genitourinary: Negative for difficulty urinating. Musculoskeletal: Positive for arthralgias and back pain. Skin: Negative for rash. Neurological: Negative for dizziness, weakness, light-headedness, numbness and headaches. Psychiatric/Behavioral: Negative for sleep disturbance. Past Medical History:     Past Medical History:   Diagnosis Date   • Concussion 03/24/2015   • Fatigue 02/23/2017   • GERD (gastroesophageal reflux disease)     as a child   • Gynecomastia 11/10/2017   • Hiatal hernia     as a child   • Mild depression 02/23/2017      Past Surgical History:     History reviewed. No pertinent surgical history.    Social History:     Social History     Socioeconomic History   • Marital status: Single     Spouse name: None   • Number of children: None   • Years of education: None   • Highest education level: None   Occupational History   • None   Tobacco Use   • Smoking status: Never   • Smokeless tobacco: Never   • Tobacco comments:     mother smoked outside of home   Vaping Use   • Vaping Use: Never used   Substance and Sexual Activity   • Alcohol use: Never   • Drug use: None   • Sexual activity: None   Other Topics Concern   • None   Social History Narrative   • None     Social Determinants of Health     Financial Resource Strain: Not on file   Food Insecurity: Not on file   Transportation Needs: Not on file   Physical Activity: Not on file   Stress: Not on file   Social Connections: Not on file   Intimate Partner Violence: Not on file   Housing Stability: Not on file      Family History:     Family History   Problem Relation Age of Onset   • Hypertension Father    • Bipolar disorder Mother       Current Medications:     No current outpatient medications on file. No current facility-administered medications for this visit. Allergies: Allergies   Allergen Reactions   • Aspirin Angioedema   • Nsaids Hives      Physical Exam:     /72   Pulse 70   Temp (!) 95.9 °F (35.5 °C)   Ht 5' 9" (1.753 m)   Wt 65.3 kg (144 lb)   SpO2 98%   BMI 21.27 kg/m²     Physical Exam  Vitals reviewed. Constitutional:       Appearance: Normal appearance. HENT:      Head: Normocephalic and atraumatic. Right Ear: Tympanic membrane, ear canal and external ear normal.      Left Ear: Tympanic membrane, ear canal and external ear normal.   Eyes:      Conjunctiva/sclera: Conjunctivae normal.   Cardiovascular:      Rate and Rhythm: Normal rate and regular rhythm. Heart sounds: Normal heart sounds. Pulmonary:      Effort: Pulmonary effort is normal.      Breath sounds: Normal breath sounds. Abdominal:      General: Bowel sounds are normal.      Palpations: Abdomen is soft. Tenderness: There is no abdominal tenderness. Musculoskeletal:         General: Normal range of motion. Cervical back: Neck supple. Right lower leg: No edema. Left lower leg: No edema. Skin:     General: Skin is warm and dry. Neurological:      Mental Status: He is alert and oriented to person, place, and time.    Psychiatric:         Mood and Affect: Mood normal.         Behavior: Behavior normal.          Elroy Torres, [id number] INTERNAL MEDICINE

## 2025-01-18 ENCOUNTER — HOSPITAL ENCOUNTER (EMERGENCY)
Facility: HOSPITAL | Age: 25
End: 2025-01-19
Attending: EMERGENCY MEDICINE | Admitting: EMERGENCY MEDICINE
Payer: COMMERCIAL

## 2025-01-18 DIAGNOSIS — R45.851 SUICIDAL IDEATIONS: Primary | ICD-10-CM

## 2025-01-18 DIAGNOSIS — F33.3 MAJOR DEPRESSIVE DISORDER, RECURRENT, SEVERE WITH PSYCHOTIC FEATURES (HCC): ICD-10-CM

## 2025-01-18 LAB
AMPHETAMINES SERPL QL SCN: NEGATIVE
BARBITURATES UR QL: NEGATIVE
BENZODIAZ UR QL: NEGATIVE
COCAINE UR QL: NEGATIVE
ETHANOL EXG-MCNC: 0 MG/DL
FENTANYL UR QL SCN: NEGATIVE
HYDROCODONE UR QL SCN: NEGATIVE
METHADONE UR QL: NEGATIVE
OPIATES UR QL SCN: NEGATIVE
OXYCODONE+OXYMORPHONE UR QL SCN: NEGATIVE
PCP UR QL: NEGATIVE
THC UR QL: NEGATIVE

## 2025-01-18 PROCEDURE — 82075 ASSAY OF BREATH ETHANOL: CPT

## 2025-01-18 PROCEDURE — 99285 EMERGENCY DEPT VISIT HI MDM: CPT | Performed by: EMERGENCY MEDICINE

## 2025-01-18 PROCEDURE — 80307 DRUG TEST PRSMV CHEM ANLYZR: CPT

## 2025-01-18 PROCEDURE — 99283 EMERGENCY DEPT VISIT LOW MDM: CPT

## 2025-01-18 RX ORDER — LORAZEPAM 1 MG/1
1 TABLET ORAL ONCE
Status: COMPLETED | OUTPATIENT
Start: 2025-01-18 | End: 2025-01-18

## 2025-01-18 RX ADMIN — LORAZEPAM 1 MG: 1 TABLET ORAL at 19:47

## 2025-01-19 ENCOUNTER — HOSPITAL ENCOUNTER (INPATIENT)
Facility: HOSPITAL | Age: 25
LOS: 8 days | Discharge: HOME | DRG: 885 | End: 2025-01-27
Attending: PSYCHIATRY & NEUROLOGY | Admitting: PSYCHIATRY & NEUROLOGY
Payer: COMMERCIAL

## 2025-01-19 VITALS
HEART RATE: 74 BPM | DIASTOLIC BLOOD PRESSURE: 62 MMHG | RESPIRATION RATE: 20 BRPM | SYSTOLIC BLOOD PRESSURE: 120 MMHG | OXYGEN SATURATION: 96 %

## 2025-01-19 DIAGNOSIS — F33.3 MAJOR DEPRESSIVE DISORDER, RECURRENT, SEVERE WITH PSYCHOTIC FEATURES (HCC): Primary | ICD-10-CM

## 2025-01-19 DIAGNOSIS — E56.9 VITAMIN DEFICIENCY, UNSPECIFIED: ICD-10-CM

## 2025-01-19 DIAGNOSIS — G47.00 INSOMNIA, UNSPECIFIED TYPE: ICD-10-CM

## 2025-01-19 DIAGNOSIS — F41.1 GENERALIZED ANXIETY DISORDER WITH PANIC ATTACKS: ICD-10-CM

## 2025-01-19 DIAGNOSIS — F41.0 GENERALIZED ANXIETY DISORDER WITH PANIC ATTACKS: ICD-10-CM

## 2025-01-19 PROBLEM — Z00.8 MEDICAL CLEARANCE FOR PSYCHIATRIC ADMISSION: Status: ACTIVE | Noted: 2025-01-19

## 2025-01-19 PROBLEM — L85.3 DRY SKIN DERMATITIS: Status: ACTIVE | Noted: 2019-06-17

## 2025-01-19 LAB
ATRIAL RATE: 96 BPM
P AXIS: 71 DEGREES
PR INTERVAL: 124 MS
QRS AXIS: 89 DEGREES
QRSD INTERVAL: 76 MS
QT INTERVAL: 354 MS
QTC INTERVAL: 448 MS
T WAVE AXIS: 65 DEGREES
VENTRICULAR RATE: 96 BPM

## 2025-01-19 PROCEDURE — 93005 ELECTROCARDIOGRAM TRACING: CPT

## 2025-01-19 PROCEDURE — GZHZZZZ GROUP PSYCHOTHERAPY: ICD-10-PCS | Performed by: PSYCHIATRY & NEUROLOGY

## 2025-01-19 PROCEDURE — 93010 ELECTROCARDIOGRAM REPORT: CPT | Performed by: INTERNAL MEDICINE

## 2025-01-19 PROCEDURE — 99253 IP/OBS CNSLTJ NEW/EST LOW 45: CPT | Performed by: NURSE PRACTITIONER

## 2025-01-19 PROCEDURE — GZ59ZZZ INDIVIDUAL PSYCHOTHERAPY, PSYCHOPHYSIOLOGICAL: ICD-10-PCS | Performed by: PSYCHIATRY & NEUROLOGY

## 2025-01-19 RX ORDER — OLANZAPINE 10 MG/1
5 TABLET ORAL
Status: CANCELLED | OUTPATIENT
Start: 2025-01-19

## 2025-01-19 RX ORDER — DIPHENHYDRAMINE HYDROCHLORIDE 50 MG/ML
50 INJECTION INTRAMUSCULAR; INTRAVENOUS EVERY 6 HOURS PRN
Status: CANCELLED | OUTPATIENT
Start: 2025-01-19

## 2025-01-19 RX ORDER — ACETAMINOPHEN 325 MG/1
975 TABLET ORAL EVERY 6 HOURS PRN
Status: DISCONTINUED | OUTPATIENT
Start: 2025-01-19 | End: 2025-01-27 | Stop reason: HOSPADM

## 2025-01-19 RX ORDER — OLANZAPINE 10 MG/2ML
5 INJECTION, POWDER, FOR SOLUTION INTRAMUSCULAR
Status: DISCONTINUED | OUTPATIENT
Start: 2025-01-19 | End: 2025-01-27 | Stop reason: HOSPADM

## 2025-01-19 RX ORDER — LORAZEPAM 2 MG/ML
2 INJECTION INTRAMUSCULAR EVERY 6 HOURS PRN
Status: DISCONTINUED | OUTPATIENT
Start: 2025-01-19 | End: 2025-01-23

## 2025-01-19 RX ORDER — BENZTROPINE MESYLATE 1 MG/ML
1 INJECTION, SOLUTION INTRAMUSCULAR; INTRAVENOUS
Status: DISCONTINUED | OUTPATIENT
Start: 2025-01-19 | End: 2025-01-27 | Stop reason: HOSPADM

## 2025-01-19 RX ORDER — PROPRANOLOL HYDROCHLORIDE 10 MG/1
10 TABLET ORAL EVERY 8 HOURS PRN
Status: CANCELLED | OUTPATIENT
Start: 2025-01-19

## 2025-01-19 RX ORDER — OLANZAPINE 2.5 MG/1
2.5 TABLET, FILM COATED ORAL
Status: CANCELLED | OUTPATIENT
Start: 2025-01-19

## 2025-01-19 RX ORDER — DIPHENHYDRAMINE HYDROCHLORIDE 50 MG/ML
50 INJECTION INTRAMUSCULAR; INTRAVENOUS EVERY 6 HOURS PRN
Status: DISCONTINUED | OUTPATIENT
Start: 2025-01-19 | End: 2025-01-27 | Stop reason: HOSPADM

## 2025-01-19 RX ORDER — ACETAMINOPHEN 325 MG/1
650 TABLET ORAL EVERY 4 HOURS PRN
Status: DISCONTINUED | OUTPATIENT
Start: 2025-01-19 | End: 2025-01-27 | Stop reason: HOSPADM

## 2025-01-19 RX ORDER — OLANZAPINE 5 MG/1
5 TABLET ORAL
Status: DISCONTINUED | OUTPATIENT
Start: 2025-01-19 | End: 2025-01-27 | Stop reason: HOSPADM

## 2025-01-19 RX ORDER — RISPERIDONE 0.5 MG/1
0.5 TABLET, ORALLY DISINTEGRATING ORAL 2 TIMES DAILY
Status: DISCONTINUED | OUTPATIENT
Start: 2025-01-19 | End: 2025-01-20

## 2025-01-19 RX ORDER — OLANZAPINE 2.5 MG/1
2.5 TABLET, FILM COATED ORAL
Status: DISCONTINUED | OUTPATIENT
Start: 2025-01-19 | End: 2025-01-27 | Stop reason: HOSPADM

## 2025-01-19 RX ORDER — MAGNESIUM HYDROXIDE/ALUMINUM HYDROXICE/SIMETHICONE 120; 1200; 1200 MG/30ML; MG/30ML; MG/30ML
30 SUSPENSION ORAL EVERY 4 HOURS PRN
Status: CANCELLED | OUTPATIENT
Start: 2025-01-19

## 2025-01-19 RX ORDER — LORAZEPAM 2 MG/ML
2 INJECTION INTRAMUSCULAR EVERY 6 HOURS PRN
Status: CANCELLED | OUTPATIENT
Start: 2025-01-19

## 2025-01-19 RX ORDER — ACETAMINOPHEN 325 MG/1
650 TABLET ORAL EVERY 6 HOURS PRN
Status: CANCELLED | OUTPATIENT
Start: 2025-01-19

## 2025-01-19 RX ORDER — ACETAMINOPHEN 325 MG/1
650 TABLET ORAL EVERY 6 HOURS PRN
Status: DISCONTINUED | OUTPATIENT
Start: 2025-01-19 | End: 2025-01-27 | Stop reason: HOSPADM

## 2025-01-19 RX ORDER — BENZTROPINE MESYLATE 1 MG/1
1 TABLET ORAL
Status: CANCELLED | OUTPATIENT
Start: 2025-01-19

## 2025-01-19 RX ORDER — PROPRANOLOL HYDROCHLORIDE 10 MG/1
10 TABLET ORAL EVERY 8 HOURS PRN
Status: DISCONTINUED | OUTPATIENT
Start: 2025-01-19 | End: 2025-01-27 | Stop reason: HOSPADM

## 2025-01-19 RX ORDER — HYDROXYZINE HYDROCHLORIDE 25 MG/1
100 TABLET, FILM COATED ORAL
Status: CANCELLED | OUTPATIENT
Start: 2025-01-19

## 2025-01-19 RX ORDER — HYDROXYZINE HYDROCHLORIDE 25 MG/1
50 TABLET, FILM COATED ORAL
Status: CANCELLED | OUTPATIENT
Start: 2025-01-19

## 2025-01-19 RX ORDER — AMOXICILLIN 250 MG
1 CAPSULE ORAL DAILY PRN
Status: CANCELLED | OUTPATIENT
Start: 2025-01-19

## 2025-01-19 RX ORDER — HYDROXYZINE HYDROCHLORIDE 50 MG/1
100 TABLET, FILM COATED ORAL
Status: DISCONTINUED | OUTPATIENT
Start: 2025-01-19 | End: 2025-01-23

## 2025-01-19 RX ORDER — AMOXICILLIN 250 MG
1 CAPSULE ORAL DAILY PRN
Status: DISCONTINUED | OUTPATIENT
Start: 2025-01-19 | End: 2025-01-27 | Stop reason: HOSPADM

## 2025-01-19 RX ORDER — OLANZAPINE 10 MG/2ML
2.5 INJECTION, POWDER, FOR SOLUTION INTRAMUSCULAR
Status: DISCONTINUED | OUTPATIENT
Start: 2025-01-19 | End: 2025-01-27 | Stop reason: HOSPADM

## 2025-01-19 RX ORDER — LORAZEPAM 1 MG/1
1 TABLET ORAL ONCE
Status: COMPLETED | OUTPATIENT
Start: 2025-01-19 | End: 2025-01-19

## 2025-01-19 RX ORDER — BENZTROPINE MESYLATE 1 MG/1
1 TABLET ORAL
Status: DISCONTINUED | OUTPATIENT
Start: 2025-01-19 | End: 2025-01-27 | Stop reason: HOSPADM

## 2025-01-19 RX ORDER — OLANZAPINE 10 MG/2ML
2.5 INJECTION, POWDER, FOR SOLUTION INTRAMUSCULAR
Status: CANCELLED | OUTPATIENT
Start: 2025-01-19

## 2025-01-19 RX ORDER — TRAZODONE HYDROCHLORIDE 50 MG/1
50 TABLET, FILM COATED ORAL
Status: DISCONTINUED | OUTPATIENT
Start: 2025-01-19 | End: 2025-01-27 | Stop reason: HOSPADM

## 2025-01-19 RX ORDER — OLANZAPINE 10 MG/2ML
5 INJECTION, POWDER, FOR SOLUTION INTRAMUSCULAR
Status: CANCELLED | OUTPATIENT
Start: 2025-01-19

## 2025-01-19 RX ORDER — HYDROXYZINE HYDROCHLORIDE 50 MG/1
50 TABLET, FILM COATED ORAL
Status: DISCONTINUED | OUTPATIENT
Start: 2025-01-19 | End: 2025-01-27 | Stop reason: HOSPADM

## 2025-01-19 RX ORDER — ACETAMINOPHEN 325 MG/1
650 TABLET ORAL EVERY 4 HOURS PRN
Status: CANCELLED | OUTPATIENT
Start: 2025-01-19

## 2025-01-19 RX ORDER — MAGNESIUM HYDROXIDE/ALUMINUM HYDROXICE/SIMETHICONE 120; 1200; 1200 MG/30ML; MG/30ML; MG/30ML
30 SUSPENSION ORAL EVERY 4 HOURS PRN
Status: DISCONTINUED | OUTPATIENT
Start: 2025-01-19 | End: 2025-01-27 | Stop reason: HOSPADM

## 2025-01-19 RX ORDER — BENZTROPINE MESYLATE 1 MG/ML
1 INJECTION, SOLUTION INTRAMUSCULAR; INTRAVENOUS
Status: CANCELLED | OUTPATIENT
Start: 2025-01-19

## 2025-01-19 RX ORDER — LORAZEPAM 1 MG/1
1 TABLET ORAL ONCE
Status: DISCONTINUED | OUTPATIENT
Start: 2025-01-19 | End: 2025-01-19 | Stop reason: HOSPADM

## 2025-01-19 RX ORDER — HYDROXYZINE HYDROCHLORIDE 25 MG/1
25 TABLET, FILM COATED ORAL
Status: CANCELLED | OUTPATIENT
Start: 2025-01-19

## 2025-01-19 RX ORDER — HYDROXYZINE HYDROCHLORIDE 25 MG/1
25 TABLET, FILM COATED ORAL
Status: DISCONTINUED | OUTPATIENT
Start: 2025-01-19 | End: 2025-01-27 | Stop reason: HOSPADM

## 2025-01-19 RX ORDER — ACETAMINOPHEN 325 MG/1
975 TABLET ORAL EVERY 6 HOURS PRN
Status: CANCELLED | OUTPATIENT
Start: 2025-01-19

## 2025-01-19 RX ORDER — TRAZODONE HYDROCHLORIDE 50 MG/1
50 TABLET, FILM COATED ORAL
Status: CANCELLED | OUTPATIENT
Start: 2025-01-19

## 2025-01-19 RX ORDER — OLANZAPINE 10 MG/1
5 TABLET ORAL 2 TIMES DAILY
Status: DISCONTINUED | OUTPATIENT
Start: 2025-01-19 | End: 2025-01-19 | Stop reason: HOSPADM

## 2025-01-19 RX ORDER — RISPERIDONE 0.5 MG/1
0.5 TABLET, ORALLY DISINTEGRATING ORAL 2 TIMES DAILY
Status: CANCELLED | OUTPATIENT
Start: 2025-01-19

## 2025-01-19 RX ADMIN — MELATONIN TAB 3 MG 3 MG: 3 TAB at 21:00

## 2025-01-19 RX ADMIN — TRAZODONE HYDROCHLORIDE 50 MG: 50 TABLET ORAL at 20:44

## 2025-01-19 RX ADMIN — LORAZEPAM 1 MG: 1 TABLET ORAL at 06:46

## 2025-01-19 RX ADMIN — RISPERIDONE 0.5 MG: 0.5 TABLET, ORALLY DISINTEGRATING ORAL at 18:03

## 2025-01-19 RX ADMIN — RISPERIDONE 0.5 MG: 0.5 TABLET, ORALLY DISINTEGRATING ORAL at 13:00

## 2025-01-19 NOTE — ED ATTENDING ATTESTATION
1/18/2025  I, Rakesh Syed MD, saw and evaluated the patient. I have discussed the patient with the resident/non-physician practitioner and agree with the resident's/non-physician practitioner's findings, Plan of Care, and MDM as documented in the resident's/non-physician practitioner's note, except where noted. All available labs and Radiology studies were reviewed.  I was present for key portions of any procedure(s) performed by the resident/non-physician practitioner and I was immediately available to provide assistance.       At this point I agree with the current assessment done in the Emergency Department.  I have conducted an independent evaluation of this patient a history and physical is as follows:    ED Course     Emergency Department Note- Jah Morejon Jr. 24 y.o. male MRN: [id number]    Unit/Bed#: Z5HB Encounter: [id number]    Jah Morejon Jr. is a 24 y.o. male who presents with   Chief Complaint   Patient presents with    Psychiatric Evaluation     Pt brought in by EMS after his mom called the  for suicidal ideations.          History of Present Illness   HPI:  Jah Morejon Jr. is a 24 y.o. male who presents for evaluation of:  Acute psychosis and suicidal ideations.  Patient was brought to the ED after his mother called the police that his son was having suicidal ideations.  The patient is unable to provide any history or review of systems secondary to acute psychosis.    Review of Systems   Unable to perform ROS: Psychiatric disorder       Historical Information   Past Medical History:   Diagnosis Date    Concussion 03/24/2015    Fatigue 02/23/2017    GERD (gastroesophageal reflux disease)     as a child    Gynecomastia 11/10/2017    Hiatal hernia     as a child    Mild depression 02/23/2017     No past surgical history on file.  Social History   Social History     Substance and Sexual Activity   Alcohol Use Never     Social History     Substance and Sexual Activity   Drug Use Not on  file     Social History     Tobacco Use   Smoking Status Never   Smokeless Tobacco Never   Tobacco Comments    mother smoked outside of home     Family History:   Family History   Problem Relation Age of Onset    Hypertension Father     Bipolar disorder Mother        Meds/Allergies   PTA meds:   None     Allergies   Allergen Reactions    Aspirin Angioedema    Nsaids Hives       Objective   First Vitals:   Blood Pressure: 119/71 (01/18/25 1933)  Pulse: (!) 107 (01/18/25 1933)  Respirations: 18 (01/18/25 1933)  SpO2: 99 % (01/18/25 1933)    Current Vitals:   Blood Pressure: 119/71 (01/18/25 1933)  Pulse: (!) 107 (01/18/25 1933)  Respirations: 18 (01/18/25 1933)  SpO2: 99 % (01/18/25 1933)    No intake or output data in the 24 hours ending 01/18/25 1949    Invasive Devices       None                   Physical Exam  Vitals and nursing note reviewed.   Constitutional:       General: He is not in acute distress.     Appearance: Normal appearance. He is well-developed.   HENT:      Head: Normocephalic and atraumatic.      Right Ear: External ear normal.      Left Ear: External ear normal.      Nose: Nose normal.      Mouth/Throat:      Pharynx: No oropharyngeal exudate.   Eyes:      Conjunctiva/sclera: Conjunctivae normal.      Pupils: Pupils are equal, round, and reactive to light.   Cardiovascular:      Rate and Rhythm: Normal rate and regular rhythm.   Pulmonary:      Effort: Pulmonary effort is normal. No respiratory distress.   Abdominal:      General: Abdomen is flat. There is no distension.      Palpations: Abdomen is soft.   Musculoskeletal:         General: No deformity. Normal range of motion.      Cervical back: Normal range of motion and neck supple.   Skin:     General: Skin is warm and dry.      Capillary Refill: Capillary refill takes less than 2 seconds.   Neurological:      General: No focal deficit present.      Mental Status: He is alert and oriented to person, place, and time. Mental status is at  "baseline.      Coordination: Coordination normal.   Psychiatric:         Mood and Affect: Affect is blunt and flat.         Behavior: Behavior is agitated and withdrawn.         Thought Content: Thought content is delusional. Thought content includes suicidal ideation.      Comments: Thought content, judgment, and decision-making capacity are impaired secondary to acute psychosis           Medical Decision Makin.  Acute psychosis with suicidal ideation: Breath alcohol test to rule out alcohol intoxication; urine drug screen to rule out illicit drugs of abuse; crisis care worker evaluation to evaluate for acute psychosis; plan to speak to parents when they are available.    Recent Results (from the past 36 hours)   POCT alcohol breath test    Collection Time: 25  7:47 PM   Result Value Ref Range    EXTBreath Alcohol 0.000      No orders to display         Portions of the record may have been created with voice recognition software. Occasional wrong word or \"sound a like\" substitutions may have occurred due to the inherent limitations of voice recognition software.  Read the chart carefully and recognize, using context, where substitutions have occurred.        Critical Care Time  Procedures      "

## 2025-01-19 NOTE — CONSULTS
Consultation - Hospitalist   Name: Jah Morejon Jr. 24 y.o. male I MRN: [id number]  Unit/Bed#: [facility] 384-02 I Date of Admission: 1/19/2025   Date of Service: 1/19/2025 I Hospital Day: 0   Inpatient consult for Medical Clearance for  patient  Consult performed by: ALPHONSO Gonsalez  Consult ordered by: Regla Lagos MD        Physician Requesting Evaluation: Jordan C Holter, DO   Reason for Evaluation / Principal Problem: Medical clearance for psychiatric admission    Assessment & Plan  Medical clearance for psychiatric admission  Vital signs stable afebrile patient seen and examined by myself at the bedside labs from today 1/20/2025 were reviewed by myself and found to be as follows sodium 142 potassium 4.0 creatinine 0.93  Patient medically stable for admit  Please reach out to  IM with any medical questions or concerns        Collaboration of Care: Were Recommendations Directly Discussed with Primary Treatment Team? - No     History of Present Illness   Jah Morejon Jr. is a 24 y.o. male who is originally admitted to the psychiatry service due to suicidal ideation with plans to kill himself with a knife. We are consulted for medical clearance for admission to Behavioral Health Unit and treatment of underlying psychiatric illness.      Patient presents to [facility] ED on 1/18/2025 via EMS.  Parents found him in a life-threatening situation with a knife.  Per his parents patient started acting strangely around Thanksgiving time, he has been religiously preoccupied, he has been screaming at the isaac, he is not sleeping or doing self-care.  Patient has no past medical history.  Patient denies any medications at home denies any tobacco use alcohol use or drug use.  Patient is now admitted to the [facility] for stabilization of his mental health issues.    Review of Systems   Constitutional:  Positive for activity change and appetite change. Negative for chills and fever.   HENT:  Negative for ear  "pain and sore throat.    Eyes:  Negative for pain and visual disturbance.   Respiratory:  Negative for cough and shortness of breath.    Cardiovascular:  Negative for chest pain and palpitations.   Gastrointestinal:  Negative for abdominal pain and vomiting.   Genitourinary:  Negative for dysuria and hematuria.   Musculoskeletal:  Negative for arthralgias and back pain.   Skin:  Negative for color change and rash.   Neurological:  Negative for seizures and syncope.   Psychiatric/Behavioral:  Positive for decreased concentration, dysphoric mood, sleep disturbance and suicidal ideas. The patient is nervous/anxious.    All other systems reviewed and are negative.      Historical Information   Past Medical History:   Diagnosis Date    Concussion 03/24/2015    Depression 02/23/2017    Fatigue 02/23/2017    GERD (gastroesophageal reflux disease)     as a child    Gynecomastia 11/10/2017    Hiatal hernia     as a child     No past surgical history on file.  Social History     Tobacco Use    Smoking status: Never    Smokeless tobacco: Never    Tobacco comments:     mother smoked outside of home   Vaping Use    Vaping status: Never Used   Substance and Sexual Activity    Alcohol use: Never    Drug use: Never    Sexual activity: Not Currently     E-Cigarette/Vaping    E-Cigarette Use Never User      E-Cigarette/Vaping Substances    Nicotine No     THC No     CBD No        Marital Status: Single    Meds/Allergies   I have reviewed home medications with patient personally.  Prior to Admission medications    Not on File     Allergies   Allergen Reactions    Aspirin Angioedema    Nsaids Hives       Objective :  Temp:  [99.4 °F (37.4 °C)] 99.4 °F (37.4 °C)  HR:  [] 94  BP: (119-123)/(62-71) 123/68  Resp:  [16-20] 16  SpO2:  [96 %-99 %] 96 %  O2 Device: None (Room air)    Height: 5' 9\" (175.3 cm) (01/19/25 1155)  Weight - Scale: 60.3 kg (133 lb) (01/19/25 1155)  Physical Exam  Vitals and nursing note reviewed. " "  Constitutional:       Appearance: Normal appearance. He is normal weight.   HENT:      Head: Normocephalic and atraumatic.      Right Ear: Tympanic membrane normal.      Left Ear: Tympanic membrane normal.      Nose: Nose normal.      Mouth/Throat:      Mouth: Mucous membranes are dry.   Eyes:      Extraocular Movements: Extraocular movements intact.      Pupils: Pupils are equal, round, and reactive to light.   Cardiovascular:      Rate and Rhythm: Normal rate and regular rhythm.      Pulses: Normal pulses.      Heart sounds: Normal heart sounds.   Pulmonary:      Effort: Pulmonary effort is normal.      Breath sounds: Normal breath sounds.   Abdominal:      General: Abdomen is flat. Bowel sounds are normal.      Palpations: Abdomen is soft.   Musculoskeletal:         General: Normal range of motion.      Cervical back: Normal range of motion and neck supple.   Skin:     General: Skin is warm and dry.      Capillary Refill: Capillary refill takes 2 to 3 seconds.   Neurological:      Mental Status: He is alert and oriented to person, place, and time.   Psychiatric:         Attention and Perception: He is inattentive.         Mood and Affect: Mood is depressed. Affect is flat.         Speech: Speech is delayed.         Behavior: Behavior is slowed and withdrawn. Behavior is cooperative.           Lab Results: I have reviewed the following results:                  No results found for: \"HGBA1C\"        Imaging Results Review: No pertinent imaging studies reviewed.  Other Study Results Review: EKG was reviewed.   1/19/2025 twelve-lead EKG reviewed by myself as well as interpreted by myself ventricular rate 96 QT interval 354 QTc 448 normal sinus rhythm when compared with an EKG from January 7, 2022 there are no new acute EKG findings noted in this EKG.  Administrative Statements   I have spent a total time of 45 minutes in caring for this patient on the day of the visit/encounter including Diagnostic results, " Prognosis, Risks and benefits of tx options, Instructions for management, Patient and family education, Importance of tx compliance, Risk factor reductions, Impressions, Counseling / Coordination of care, Documenting in the medical record, Reviewing / ordering tests, medicine, procedures  , Obtaining or reviewing history  , and Communicating with other healthcare professionals .  ** Please Note: This note has been constructed using a voice recognition system. **

## 2025-01-19 NOTE — ED NOTES
24 year old male presents for worsening psychiatric health that includes suicidal ideation.   There is confusion among reported line of events.  Per 911 report, father, and  accounts; patient admitted to having thoughts of suicide and a plan to kill himself this evening with a knife.  Patient's father on scene reports he saw the patient with a large sharp serrated house prior this evening. Father reports he was able to remove the knife from the patient's possession.   Upon speaking with the father myself, he says this is not what happened.  He noticed a knife was missing from knife rack and instead it was on the table. He says patient did NOT have a knife.     EMS reports patient speaking to self in ambulance and asking providers unsettling questions regarding death and survival after death.    Collateral obtained from father states patient has exhibited psychiatric issues and they culminated around Thanksgiving. Patient is acting strangely, is extremely religiously preoccupied, screaming at the isaac to forgive him among other things, not eating, not sleeping, not taking care of himself, and today was yelling/screaming/not making sense. Regarding SI, dad says at some point he told mother.       There is no current self harm, or other SI/HI.  Unknown to if/what extent patient has AH/VH.   Does mumble and speak to himself. No psychiatrist/no therapist/no medications/one [facility] admission when a child but no known schizophrenia history.  Mother does have bipolar.  Patient did have one therapy session this month which was free through a [facility] and they said patient needs help.  Negative medical history such as seizures, diabetes, trauma, etc. Significant disturbance in ADLS, eating, sleeping.  Denies substance use, no legal issues, no weapons, unable to obtain psych trauma.        In ED, patient is disheveled, withdrawn and does not talk much if it all.  After much time, patient does talk about some  things. Not aggressive or unpleasant in ED.      Patient initially not wanting to go with a 201 but then when father was going to 302 and he learned the consequences, he signed a 201.

## 2025-01-19 NOTE — EMTALA/ACUTE CARE TRANSFER
[facility] EMERGENCY DEPARTMENT  [address] [address]  Dept: [phone]      EMTALA TRANSFER CONSENT    NAME Jah Morejon Jr.                                         2000                              MRN [id number]    I have been informed of my rights regarding examination, treatment, and transfer   by Dr. Orlando Martin,     Benefits:      Risks:        Transfer Request   I acknowledge that my medical condition has been evaluated and explained to me by the emergency department physician or other qualified medical person and/or my attending physician who has recommended and offered to me further medical examination and treatment. I understand the Hospital's obligation with respect to the treatment and stabilization of my emergency medical condition. I nevertheless request to be transferred. I release the Hospital, the doctor, and any other persons caring for me from all responsibility or liability for any injury or ill effects that may result from my transfer and agree to accept all responsibility for the consequences of my choice to transfer, rather than receive stabilizing treatment at the Hospital. I understand that because the transfer is my request, my insurance may not provide reimbursement for the services.  The Hospital will assist and direct me and my family in how to make arrangements for transfer, but the hospital is not liable for any fees charged by the transport service.  In spite of this understanding, I refuse to consent to further medical examination and treatment which has been offered to me, and request transfer to Accepting Facility Name, City & State : [address], [place]. I authorize the performance of emergency medical procedures and treatments upon me in both transit and upon arrival at the receiving facility.  Additionally, I authorize the release of any and all medical records to the receiving facility and request they be  transported with me, if possible.    I authorize the performance of emergency medical procedures and treatments upon me in both transit and upon arrival at the receiving facility.  Additionally, I authorize the release of any and all medical records to the receiving facility and request they be transported with me, if possible.  I understand that the safest mode of transportation during a medical emergency is an ambulance and that the Hospital advocates the use of this mode of transport. Risks of traveling to the receiving facility by car, including absence of medical control, life sustaining equipment, such as oxygen, and medical personnel has been explained to me and I fully understand them.    (SANJUANA CORRECT BOX BELOW)  [  ]  I consent to the stated transfer and to be transported by ambulance/helicopter.  [  ]  I consent to the stated transfer, but refuse transportation by ambulance and accept full responsibility for my transportation by car.  I understand the risks of non-ambulance transfers and I exonerate the Hospital and its staff from any deterioration in my condition that results from this refusal.    X___________________________________________    DATE  25  TIME________  Signature of patient or legally responsible individual signing on patient behalf           RELATIONSHIP TO PATIENT_________________________          Provider Certification    NAME Jah Morejon                                          2000                              MRN [id number]    A medical screening exam was performed on the above named patient.  Based on the examination:    Condition Necessitating Transfer The primary encounter diagnosis was Suicidal ideations. A diagnosis of Major depressive disorder, recurrent, severe with psychotic features (HCC) was also pertinent to this visit.    Patient Condition:      Reason for Transfer:      Transfer Requirements: Facility [address], [place]   Space available and  qualified personnel available for treatment as acknowledged by [facility] ([facility]) [phone]  Agreed to accept transfer and to provide appropriate medical treatment as acknowledged by       DR. PATY JEROME  Appropriate medical records of the examination and treatment of the patient are provided at the time of transfer   STAFF INITIAL WHEN COMPLETED _______  Transfer will be performed by qualified personnel from [facility]  and appropriate transfer equipment as required, including the use of necessary and appropriate life support measures.    Provider Certification: I have examined the patient and explained the following risks and benefits of being transferred/refusing transfer to the patient/family:         Based on these reasonable risks and benefits to the patient and/or the unborn child(alvin), and based upon the information available at the time of the patient’s examination, I certify that the medical benefits reasonably to be expected from the provision of appropriate medical treatments at another medical facility outweigh the increasing risks, if any, to the individual’s medical condition, and in the case of labor to the unborn child, from effecting the transfer.    X____________________________________________ DATE 01/19/25        TIME_______      ORIGINAL - SEND TO MEDICAL RECORDS   COPY - SEND WITH PATIENT DURING TRANSFER

## 2025-01-19 NOTE — ED NOTES
Patient is accepted at [address]   Patient is accepted by Dr. PATY Lagos per Mechelle (Intake) .     Transportation is arranged with [facility].     Transportation is scheduled for 1/19/2025 @ 11am  Patient may go to the floor at 11am          Nurse report is to be called to  [phone] prior to patient transfer.    Mamadou Enciso  Crisis Intervention Specialist II

## 2025-01-19 NOTE — ED CARE HANDOFF
Emergency Department Sign Out Note        Sign out and transfer of care from morning resident . See Separate Emergency Department note.     The patient, Jah Morejon Jr., was evaluated by the previous provider for Suicidal ideation    Pt is medically cleared for inpatient behavioral health     Workup Completed:      ED Course / Workup Pending (followup):                                       Procedures  Medical Decision Making  Amount and/or Complexity of Data Reviewed  Labs: ordered.    Risk  OTC drugs.  Prescription drug management.  Decision regarding hospitalization.            Disposition  Final diagnoses:   Suicidal ideations     Time reflects when diagnosis was documented in both MDM as applicable and the Disposition within this note       Time User Action Codes Description Comment    1/18/2025  8:14 PM Veronica Armstrong Add [R45.851] Suicidal ideations           ED Disposition       ED Disposition   Transfer to Behavioral Health    Condition   --    Date/Time   Sat Jan 18, 2025  8:14 PM    Comment   --             MD Documentation      Flowsheet Row Most Recent Value   Sending MD Orlando Martin MD          Follow-up Information    None       Patient's Medications    No medications on file     No discharge procedures on file.       ED Provider  Electronically Signed by     Emilia Pat DO  01/19/25 0859

## 2025-01-19 NOTE — ED NOTES
Spoke with crisis regarding updating the patient's father while here.  States he will look at intake today to see where patient my go.     Flor Conrad RN  01/19/25 9830

## 2025-01-19 NOTE — ED PROVIDER NOTES
Time reflects when diagnosis was documented in both MDM as applicable and the Disposition within this note       Time User Action Codes Description Comment    1/18/2025  8:14 PM Veronica Armstrong Add [R45.851] Suicidal ideations     1/19/2025  9:37 AM Regla Goddard Add [F33.3] Major depressive disorder, recurrent, severe with psychotic features (HCC)           ED Disposition       ED Disposition   Transfer to Behavioral Health Condition   --    Date/Time   Sat Jan 18, 2025  8:14 PM    Comment   --             Assessment & Plan       Medical Decision Making  Patient presents for psychiatric evaluation.  Patient is not providing much history.  Will consult crisis, order UDS and breath alcohol.    Crisis was consulted.  Will sign a 201.  If patient revokes 201 patient's parents will petition for 302.  Patient was medically cleared for inpatient psychiatric evaluation.    Amount and/or Complexity of Data Reviewed  Labs: ordered.    Risk  Prescription drug management.  Decision regarding hospitalization.             Medications   LORazepam (ATIVAN) tablet 1 mg (1 mg Oral Given 1/18/25 1947)   LORazepam (ATIVAN) tablet 1 mg (1 mg Oral Given 1/19/25 0646)       ED Risk Strat Scores                          SBIRT 20yo+      Flowsheet Row Most Recent Value   Initial Alcohol Screen: US AUDIT-C     1. How often do you have a drink containing alcohol? 0 Filed at: 01/18/2025 1924   2. How many drinks containing alcohol do you have on a typical day you are drinking?  0 Filed at: 01/18/2025 1924   3a. Male UNDER 65: How often do you have five or more drinks on one occasion? 0 Filed at: 01/18/2025 1924   3b. FEMALE Any Age, or MALE 65+: How often do you have 4 or more drinks on one occassion? 0 Filed at: 01/18/2025 1924   Audit-C Score 0 Filed at: 01/18/2025 1924   ROCIO: How many times in the past year have you...    Used an illegal drug or used a prescription medication for non-medical reasons? Never Filed at:  01/18/2025 1924                            History of Present Illness       Chief Complaint   Patient presents with    Psychiatric Evaluation     Pt brought in by EMS after his mom called the  for suicidal ideations.        Past Medical History:   Diagnosis Date    Concussion 03/24/2015    Depression 02/23/2017    Fatigue 02/23/2017    GERD (gastroesophageal reflux disease)     as a child    Gynecomastia 11/10/2017    Hiatal hernia     as a child      History reviewed. No pertinent surgical history.   Family History   Problem Relation Age of Onset    Hypertension Father     Bipolar disorder Mother       Social History     Tobacco Use    Smoking status: Never    Smokeless tobacco: Never    Tobacco comments:     mother smoked outside of home   Vaping Use    Vaping status: Never Used   Substance Use Topics    Alcohol use: Never    Drug use: Never      E-Cigarette/Vaping    E-Cigarette Use Never User       E-Cigarette/Vaping Substances    Nicotine No     THC No     CBD No       I have reviewed and agree with the history as documented.     HPI    Patient is a 24-year-old male who presents for psychiatric evaluation.  Per EMS, patient's parents called police after patient was found holding a large knife and threatening to kill himself.  Patient is not providing any history.  He denies SI, HI, hallucinations. He denies drug or alcohol use. He is not currently interested in signing into psychiatric unit at this time.     Review of Systems   Psychiatric/Behavioral:  Negative for self-injury and suicidal ideas.            Objective       ED Triage Vitals [01/18/25 1933]   Temp Pulse Blood Pressure Respirations SpO2 Patient Position - Orthostatic VS   -- (!) 107 119/71 18 99 % Lying      Temp src Heart Rate Source BP Location FiO2 (%) Pain Score    -- Monitor Left arm -- --      Vitals      Date and Time Temp Pulse SpO2 Resp BP Pain Score FACES Pain Rating User   01/19/25 0630 -- 74 96 % 20 120/62 -- -- KG   01/18/25 1933  -- Pt refuses to have temp taken 107 99 % 18 119/71 -- -- CM            Physical Exam  Vitals and nursing note reviewed.   HENT:      Head: Normocephalic and atraumatic.      Mouth/Throat:      Mouth: Mucous membranes are moist.   Eyes:      Extraocular Movements: Extraocular movements intact.   Cardiovascular:      Rate and Rhythm: Normal rate and regular rhythm.   Pulmonary:      Effort: Pulmonary effort is normal.   Musculoskeletal:         General: Normal range of motion.      Cervical back: Normal range of motion.   Neurological:      Mental Status: He is alert.   Psychiatric:         Mood and Affect: Affect is blunt.         Speech: Speech normal.         Behavior: Behavior is cooperative.         Results Reviewed       Procedure Component Value Units Date/Time    Rapid drug screen, urine [[id number]]  (Normal) Collected: 01/18/25 2128    Lab Status: Final result Specimen: Urine, Clean Catch Updated: 01/18/25 2202     Amph/Meth UR Negative     Barbiturate Ur Negative     Benzodiazepine Urine Negative     Cocaine Urine Negative     Methadone Urine Negative     Opiate Urine Negative     PCP Ur Negative     THC Urine Negative     Oxycodone Urine Negative     Fentanyl Urine Negative     HYDROCODONE URINE Negative    Narrative:      FOR MEDICAL PURPOSES ONLY.   IF CONFIRMATION NEEDED PLEASE CONTACT THE LAB WITHIN 5 DAYS.    Drug Screen Cutoff Levels:  AMPHETAMINE/METHAMPHETAMINES  1000 ng/mL  BARBITURATES     200 ng/mL  BENZODIAZEPINES     200 ng/mL  COCAINE      300 ng/mL  METHADONE      300 ng/mL  OPIATES      300 ng/mL  PHENCYCLIDINE     25 ng/mL  THC       50 ng/mL  OXYCODONE      100 ng/mL  FENTANYL      5 ng/mL  HYDROCODONE     300 ng/mL    POCT alcohol breath test [[id number]]  (Normal) Resulted: 01/18/25 1947    Lab Status: Final result Updated: 01/18/25 1947     EXTBreath Alcohol 0.000            No orders to display       Procedures    ED Medication and Procedure Management   None     There are no discharge  medications for this patient.    No discharge procedures on file.  ED SEPSIS DOCUMENTATION   Time reflects when diagnosis was documented in both MDM as applicable and the Disposition within this note       Time User Action Codes Description Comment    1/18/2025  8:14 PM Veronica Armstrong Add [R45.851] Suicidal ideations     1/19/2025  9:37 AM Regla Goddard Add [F33.3] Major depressive disorder, recurrent, severe with psychotic features (HCC)                  Veronica Armstrong MD  01/21/25 5152

## 2025-01-19 NOTE — NURSING NOTE
Pt is a 201 from [facility]. Per ED pt was brought in by the  due to mom calling for them to check on her son after hearing that he was having thoughts of harming himself with a knife. Patients dad is stating that patients mental health has been declining since thanksgiving that the patient is religiously preoccupied and acting strange.     Upon arrival to the unit pt Is calm and cooperative through the admission process. Pt denies SI, HI, AH, VH and pain at this time. Pt states he has had passive thoughts of SI but he has never acted on them before this was the first time he had taken a knife with thoughts to hurt himself. Pt never acted on the thoughts. Pt states he feels safe on the unit, Pt was shown his room and the unit. Gave him a lunch tray and drinks. Pt denies any unmet needs and q15 min checks started upon arrival to unit.

## 2025-01-19 NOTE — NURSING NOTE
Pt states he is not taking any medications. No medications on file. Confirmed with pharmacy on file he has not picked up or ordered any medication. Med Rec completed. Dr Lagos aware.

## 2025-01-19 NOTE — ASSESSMENT & PLAN NOTE
Vital signs stable afebrile patient seen and examined by myself at the bedside labs from today 1/20/2025 were reviewed by myself and found to be as follows sodium 142 potassium 4.0 creatinine 0.93  Patient medically stable for admit  Please reach out to [facility] with any medical questions or concerns

## 2025-01-20 LAB
25(OH)D3 SERPL-MCNC: 12.8 NG/ML (ref 30–100)
ALBUMIN SERPL BCG-MCNC: 4.1 G/DL (ref 3.5–5)
ALP SERPL-CCNC: 32 U/L (ref 34–104)
ALT SERPL W P-5'-P-CCNC: 10 U/L (ref 7–52)
ANION GAP SERPL CALCULATED.3IONS-SCNC: 7 MMOL/L (ref 4–13)
AST SERPL W P-5'-P-CCNC: 14 U/L (ref 13–39)
BASOPHILS # BLD AUTO: 0.05 THOUSANDS/ΜL (ref 0–0.1)
BASOPHILS NFR BLD AUTO: 1 % (ref 0–1)
BILIRUB SERPL-MCNC: 0.6 MG/DL (ref 0.2–1)
BUN SERPL-MCNC: 10 MG/DL (ref 5–25)
CALCIUM SERPL-MCNC: 8.9 MG/DL (ref 8.4–10.2)
CHLORIDE SERPL-SCNC: 106 MMOL/L (ref 96–108)
CHOLEST SERPL-MCNC: 118 MG/DL (ref ?–200)
CO2 SERPL-SCNC: 29 MMOL/L (ref 21–32)
CREAT SERPL-MCNC: 0.93 MG/DL (ref 0.6–1.3)
EOSINOPHIL # BLD AUTO: 0.13 THOUSAND/ΜL (ref 0–0.61)
EOSINOPHIL NFR BLD AUTO: 2 % (ref 0–6)
ERYTHROCYTE [DISTWIDTH] IN BLOOD BY AUTOMATED COUNT: 10.5 % (ref 11.6–15.1)
EST. AVERAGE GLUCOSE BLD GHB EST-MCNC: 74 MG/DL
FOLATE SERPL-MCNC: 13.5 NG/ML
GFR SERPL CREATININE-BSD FRML MDRD: 115 ML/MIN/1.73SQ M
GLUCOSE P FAST SERPL-MCNC: 89 MG/DL (ref 65–99)
GLUCOSE SERPL-MCNC: 89 MG/DL (ref 65–140)
HBA1C MFR BLD: 4.2 %
HCT VFR BLD AUTO: 41.2 % (ref 36.5–49.3)
HDLC SERPL-MCNC: 44 MG/DL
HGB BLD-MCNC: 13.6 G/DL (ref 12–17)
IMM GRANULOCYTES # BLD AUTO: 0.02 THOUSAND/UL (ref 0–0.2)
IMM GRANULOCYTES NFR BLD AUTO: 0 % (ref 0–2)
LDLC SERPL CALC-MCNC: 57 MG/DL (ref 0–100)
LYMPHOCYTES # BLD AUTO: 1.66 THOUSANDS/ΜL (ref 0.6–4.47)
LYMPHOCYTES NFR BLD AUTO: 29 % (ref 14–44)
MCH RBC QN AUTO: 31.6 PG (ref 26.8–34.3)
MCHC RBC AUTO-ENTMCNC: 33 G/DL (ref 31.4–37.4)
MCV RBC AUTO: 96 FL (ref 82–98)
MONOCYTES # BLD AUTO: 0.65 THOUSAND/ΜL (ref 0.17–1.22)
MONOCYTES NFR BLD AUTO: 11 % (ref 4–12)
NEUTROPHILS # BLD AUTO: 3.3 THOUSANDS/ΜL (ref 1.85–7.62)
NEUTS SEG NFR BLD AUTO: 57 % (ref 43–75)
NONHDLC SERPL-MCNC: 74 MG/DL
NRBC BLD AUTO-RTO: 0 /100 WBCS
PLATELET # BLD AUTO: 246 THOUSANDS/UL (ref 149–390)
PMV BLD AUTO: 8 FL (ref 8.9–12.7)
POTASSIUM SERPL-SCNC: 4 MMOL/L (ref 3.5–5.3)
PROT SERPL-MCNC: 6.4 G/DL (ref 6.4–8.4)
RBC # BLD AUTO: 4.31 MILLION/UL (ref 3.88–5.62)
SODIUM SERPL-SCNC: 142 MMOL/L (ref 135–147)
TREPONEMA PALLIDUM IGG+IGM AB [PRESENCE] IN SERUM OR PLASMA BY IMMUNOASSAY: NORMAL
TRIGL SERPL-MCNC: 85 MG/DL (ref ?–150)
TSH SERPL DL<=0.05 MIU/L-ACNC: 3.2 UIU/ML (ref 0.45–4.5)
VIT B12 SERPL-MCNC: 336 PG/ML (ref 180–914)
WBC # BLD AUTO: 5.81 THOUSAND/UL (ref 4.31–10.16)

## 2025-01-20 PROCEDURE — 83036 HEMOGLOBIN GLYCOSYLATED A1C: CPT | Performed by: PSYCHIATRY & NEUROLOGY

## 2025-01-20 PROCEDURE — 85025 COMPLETE CBC W/AUTO DIFF WBC: CPT | Performed by: PSYCHIATRY & NEUROLOGY

## 2025-01-20 PROCEDURE — 80061 LIPID PANEL: CPT | Performed by: PSYCHIATRY & NEUROLOGY

## 2025-01-20 PROCEDURE — 99223 1ST HOSP IP/OBS HIGH 75: CPT | Performed by: PSYCHIATRY & NEUROLOGY

## 2025-01-20 PROCEDURE — 84443 ASSAY THYROID STIM HORMONE: CPT | Performed by: PSYCHIATRY & NEUROLOGY

## 2025-01-20 PROCEDURE — 86780 TREPONEMA PALLIDUM: CPT | Performed by: PSYCHIATRY & NEUROLOGY

## 2025-01-20 PROCEDURE — 80053 COMPREHEN METABOLIC PANEL: CPT | Performed by: PSYCHIATRY & NEUROLOGY

## 2025-01-20 PROCEDURE — 82746 ASSAY OF FOLIC ACID SERUM: CPT | Performed by: NURSE PRACTITIONER

## 2025-01-20 PROCEDURE — 82306 VITAMIN D 25 HYDROXY: CPT | Performed by: PSYCHIATRY & NEUROLOGY

## 2025-01-20 PROCEDURE — 82607 VITAMIN B-12: CPT | Performed by: PSYCHIATRY & NEUROLOGY

## 2025-01-20 RX ORDER — ERGOCALCIFEROL 1.25 MG/1
50000 CAPSULE, LIQUID FILLED ORAL WEEKLY
Status: DISCONTINUED | OUTPATIENT
Start: 2025-01-21 | End: 2025-01-27 | Stop reason: HOSPADM

## 2025-01-20 RX ORDER — RISPERIDONE 1 MG/1
1 TABLET, ORALLY DISINTEGRATING ORAL
Status: DISCONTINUED | OUTPATIENT
Start: 2025-01-20 | End: 2025-01-22

## 2025-01-20 RX ORDER — FOLIC ACID 1 MG/1
1 TABLET ORAL DAILY
Status: DISCONTINUED | OUTPATIENT
Start: 2025-01-21 | End: 2025-01-27 | Stop reason: HOSPADM

## 2025-01-20 RX ORDER — CYANOCOBALAMIN 1000 UG/ML
1000 INJECTION, SOLUTION INTRAMUSCULAR; SUBCUTANEOUS ONCE
Status: DISCONTINUED | OUTPATIENT
Start: 2025-01-21 | End: 2025-01-27

## 2025-01-20 RX ORDER — RISPERIDONE 0.5 MG/1
0.5 TABLET, ORALLY DISINTEGRATING ORAL DAILY
Status: DISCONTINUED | OUTPATIENT
Start: 2025-01-21 | End: 2025-01-21

## 2025-01-20 RX ADMIN — RISPERIDONE 1 MG: 1 TABLET, ORALLY DISINTEGRATING ORAL at 21:43

## 2025-01-20 RX ADMIN — MELATONIN TAB 3 MG 3 MG: 3 TAB at 21:43

## 2025-01-20 NOTE — PLAN OF CARE
Problem: DEPRESSION  Goal: Will be euthymic at discharge  Description: INTERVENTIONS:  - Administer medication as ordered  - Provide emotional support via 1:1 interaction with staff  - Encourage involvement in milieu/groups/activities  - Monitor for social isolation  Outcome: Not Progressing     Problem: PSYCHOSIS  Goal: Will report no hallucinations or delusions  Description: Interventions:  - Administer medication as  ordered  - Every waking shifts and PRN assess for the presence of hallucinations and or delusions  - Assist with reality testing to support increasing orientation  - Assess if patient's hallucinations or delusions are encouraging self-harm or harm to others and intervene as appropriate  Outcome: Not Progressing     Problem: Ineffective Coping  Goal: Participates in unit activities  Description: Interventions:  - Provide therapeutic environment   - Provide required programming   - Redirect inappropriate behaviors   Outcome: Not Progressing     Problem: DISCHARGE PLANNING - CARE MANAGEMENT  Goal: Discharge to post-acute care or home with appropriate resources  Description: INTERVENTIONS:  - Conduct assessment to determine patient/family and health care team treatment goals, and need for post-acute services based on payer coverage, community resources, and patient preferences, and barriers to discharge  - Address psychosocial, clinical, and financial barriers to discharge as identified in assessment in conjunction with the patient/family and health care team  - Arrange appropriate level of post-acute services according to patient’s   needs and preference and payer coverage in collaboration with the physician and health care team  - Communicate with and update the patient/family, physician, and health care team regarding progress on the discharge plan  - Arrange appropriate transportation to post-acute venues  Outcome: Not Progressing

## 2025-01-20 NOTE — TREATMENT PLAN
TREATMENT PLAN REVIEW - Behavioral Health Jah Morejon  24 y.o. 2000 male MRN: [id number]    [facility] 3P BEHAVIORAL HLTH Room / Bed: [facility] 384/[facility] 384-02 Encounter: [id number]          Admit Date/Time:  1/19/2025 11:40 AM    Treatment Team:   Jordan C Holter, DO Mahnaz Hussain, MD Heather M Huff Desmon Williams Jessica Jones, LPN Rebecca Sussman Annia Acosta    Diagnosis: Principal Problem:    Major depressive disorder, recurrent, severe with psychotic features (HCC)  Active Problems:    Medical clearance for psychiatric admission      Patient Strengths/Assets: ability for insight, average or above intelligence, capable of independent living, patient is on a voluntary commitment, patient is willing to work on problems, Taoism affiliation    Patient Barriers/Limitations: poor insight, poor reasoning ability    Short Term Goals: decrease in psychotic symptoms, mood stabilization, increase in group attendance, increase in socialization with peers on the unit, acceptance of need for psychiatric treatment, acceptance of psychiatric medications    Long Term Goals: stabilization of mood, free of suicidal thoughts, resolution of psychotic symptoms, improved insight, acceptance of need for psychiatric medications, acceptance of need for psychiatric treatment, acceptance of need for psychiatric follow up after discharge, acceptance of psychiatric diagnosis, appropriate interaction with peers, stable living arrangements upon discharge, establishment of family support upon discharge    Progress Towards Goals: starting psychiatric medications as prescribed, continue psychiatric medications as prescribed    Recommended Treatment: medication management, patient medication education, group therapy, milieu therapy, continued Behavioral Health psychiatric evaluation/assessment process    Treatment Frequency: daily medication monitoring, group and milieu therapy daily, monitoring  through interdisciplinary rounds, monitoring through weekly patient care conferences    Expected Discharge Date:  TBD    Discharge Plan: referrals as indicated, return to previous living arrangement    Treatment Plan Created/Updated By: Jordan Christopher Holter, DO

## 2025-01-20 NOTE — ASSESSMENT & PLAN NOTE
Continue upward titration of Risperdal 0.5 mg daily and 1 mg nightly to address psychosis.  Upward titration as necessary and as tolerated.  Can consider transition to Invega for introduction of Invega Sustenna if outpatient compliance becomes a concern  Orders from past 72 hours:    Inpatient consult for Medical Clearance for BH patient; Standing

## 2025-01-20 NOTE — H&P
Psychiatric Evaluation - Department of Behavioral Health   Jah Morejon Jr. 24 y.o. male MRN: [id number]  Unit/Bed#: [facility] 384-02 Encounter: [id number]    ASSESSMENT & PLAN     Assessment & Plan  Major depressive disorder, recurrent, severe with psychotic features (HCC)  Continue upward titration of Risperdal 0.5 mg daily and 1 mg nightly to address psychosis.  Upward titration as necessary and as tolerated.  Can consider transition to Invega for introduction of Invega Sustenna if outpatient compliance becomes a concern  Orders from past 72 hours:    Inpatient consult for Medical Clearance for  patient; Standing    Medical clearance for psychiatric admission    No associated orders from this encounter found during lookback period of 72 hours.      Treatment Recommendations/Precautions:  Admission labs evaluated; see below.  Continue medical management per medicine.  Collaborate with collaterals for baseline assessment and disposition.  Continue behavioral santiago checks q.15 minutes.  Continue vitals per behavioral health unit protocol.  Continue to promote participation in individual, social and group therapeutic milieu.  Discharge date per primary team.     Risk Assessment:  Risk of Harm to Self:  The following ratings are based on assessment at the time of the interview and review of records  Based on today's assessment, Jah presents the following risk of harm to self: low    Risk of Harm to Others:  The following ratings are based on assessment at the time of the interview and review of records  Based on today's assessment, Jah presents the following risk of harm to others: low    Medications Risks/Benefits:    Risks, benefits, and possible side effects of medications explained to Jah and he verbalizes understanding and agreement for treatment    HISTORY OF PRESENT ILLNESS (HPI)     Jah Morejon Jr. is a 24 y.o., overtly appearing  male, possessing remote psychiatric history of possible  "unspecified mood disorder, presenting to [facility] behavioral health 3P as a 201, subsequent to worsening signs/symptoms of psychosis including paranoid/persecutory delusions, Scientologist preoccupation in addition to worsening dysphoric mood including depression and depressive signs/symptoms that include suicidal ideation with particular plan involving a knife    Per case management (Marylu David) on 01/19: \"24 year old male presents for worsening psychiatric health that includes suicidal ideation.   There is confusion among reported line of events.  Per 911 report, father, and  accounts; patient admitted to having thoughts of suicide and a plan to kill himself this evening with a knife.  Patient's father on scene reports he saw the patient with a large sharp serrated house prior this evening. Father reports he was able to remove the knife from the patient's possession.   Upon speaking with the father myself, he says this is not what happened.  He noticed a knife was missing from knife rack and instead it was on the table. He says patient did NOT have a knife.     EMS reports patient speaking to self in ambulance and asking providers unsettling questions regarding death and survival after death.Collateral obtained from father states patient has exhibited psychiatric issues and they culminated around Thanksgiving. Patient is acting strangely, is extremely religiously preoccupied, screaming at the isaac to forgive him among other things, not eating, not sleeping, not taking care of himself, and today was yelling/screaming/not making sense. Regarding SI, dad says at some point he told mother. There is no current self harm, or other SI/HI.  Unknown to if/what extent patient has AH/VH.   Does mumble and speak to himself. No psychiatrist/no therapist/no medications/one [facility] when a child but no known schizophrenia history.  Mother does have " "bipolar.  Patient did have one therapy session this month which was free through a [facility] and they said patient needs help.  Negative medical history such as seizures, diabetes, trauma, etc. Significant disturbance in ADLS, eating, sleeping.  Denies substance use, no legal issues, no weapons, unable to obtain psych trauma. In ED, patient is disheveled, withdrawn and does not talk much if it all.  After much time, patient does talk about some things. Not aggressive or unpleasant in ED.      Patient initially not wanting to go with a 201 but then when father was going to 302 and he learned the consequences, he signed a 201.\"     Presently, patient adamantly denies suicidal/homicidal ideation in addition to thoughts of self-injury, although acknowledges previous suicidal ideation with particular plan involving a knife, dorinda for safety in the inpatient setting, appearing receptive to crisis planning provided by this writer.  He admits to dysphoric mood including depression and anxiety, stating that this is related to a \"mistake\", referencing an evil thoughts about God, appearing religiously preoccupied throughout evaluation, although patient denies additional psychiatric complaint/concerns including instances of panic, signs/symptoms of shahnaz/hypomania and psychosis.  At conclusion of evaluation, patient is agreeable to continuation of previously prescribed psychotropic medication    PSYCHIATRIC REVIEW OF SYSTEMS     Appetite: no  Adverse eating: no  Weight changes: no  Insomnia/sleeplessness: yes, increased  Fatigue/anergy: yes, increased  Anhedonia/lack of interest: yes, increased  Attention/concentration: yes, decreased  Psychomotor agitation/retardation: no  Somatic symptoms: no  Anxiety/panic attack: yes  Shahnaz/hypomania: no  Hopelessness/helplessness/worthlessness: yes  Self-injurious behavior/high-risk behavior: yes  Suicidal ideation: no, status post suicidal gesture  Homicidal ideation: no  Auditory " hallucinations: no  Visual hallucinations: no  Other perceptual disturbances: no  Delusional thinking: yes, Shinto preoccupation  Obsessive/compulsive symptoms: no    REVIEW OF SYSTEMS   Pertinent items are noted in HPI.    HISTORICAL INFORMATION     Past Psychiatric History:   Past Psychiatric management: Admits to remote inpatient behavioral health admission through [facility], denying present outpatient psychiatric management  Past Suicide attempts/self-injurious behavior: Denies  Past Violent behavior: Denies  Past Psychiatric medications: Denies    Substance Abuse History:  I spent time with patient in counseling and education on risk of substance abuse. Assessed him motivation and encouraged patient for treatment. Brief intervention done.   Social History       Tobacco History       Smoking Status  Never      Smokeless Tobacco Use  Never      Tobacco Comments  mother smoked outside of home              Alcohol History       Alcohol Use Status  Never              Drug Use       Drug Use Status  Never              Sexual Activity       Sexually Active  Not Currently              Other Factors    Not Asked                 Additional Substance Use Detail       Questions Responses    Problems Due to Past Use of Alcohol? No    Problems Due to Past Use of Substances? No    Substance Use Assessment Substance use within the past 12 months    Alcohol Use Frequency Denies use in past 12 months    Cannabis frequency Never used    Comment:  Never used on 1/19/2025     Heroin Frequency Denies use in past 12 months    Cocaine frequency Never used    Comment:  Never used on 1/19/2025     Crack Cocaine Frequency Denies use in past 12 months    Methamphetamine Frequency Denies use in past 12 months    Narcotic Frequency Denies use in past 12 months    Benzodiazepine Frequency Denies use in past 12 months    Amphetamine frequency Denies use in past 12 months    Barbituate Frequency Denies use use in past 12 months    Inhalant  frequency Never used    Comment:  Never used on 1/19/2025     Hallucinogen frequency Never used    Comment:  Never used on 1/19/2025     Ecstasy frequency Never used    Comment:  Never used on 1/19/2025     Other drug frequency Never used    Comment:  Never used on 1/19/2025     Opiate frequency Denies use in past 12 months    Last reviewed by Onelia Addison RN on 1/19/2025          I have assessed this patient for substance use within the past 12 months    Family Psychiatric History:   Mother - bipolar disorder    Social History:  Education: 9th grade  Learning Disabilities: denies  Marital history: single  Living arrangement, social support: resides with father  Occupational History: unemployed  Functioning Relationships: good support system.  Other Pertinent History: Denies access to firearms/weaponry, denying previous legal involvement      Traumatic History:   Abuse: Denies  Other Traumatic Events: Denies    OBJECTIVE     Past Medical History:   Diagnosis Date    Concussion 03/24/2015    Depression 02/23/2017    Fatigue 02/23/2017    GERD (gastroesophageal reflux disease)     as a child    Gynecomastia 11/10/2017    Hiatal hernia     as a child       Meds/Allergies   all current active meds have been reviewed and current meds:   Current Facility-Administered Medications:     acetaminophen (TYLENOL) tablet 650 mg, Q6H PRN    acetaminophen (TYLENOL) tablet 650 mg, Q4H PRN    acetaminophen (TYLENOL) tablet 975 mg, Q6H PRN    aluminum-magnesium hydroxide-simethicone (MAALOX) oral suspension 30 mL, Q4H PRN    benztropine (COGENTIN) injection 1 mg, Q4H PRN Max 6/day    benztropine (COGENTIN) tablet 1 mg, Q4H PRN Max 6/day    hydrOXYzine HCL (ATARAX) tablet 50 mg, Q6H PRN Max 4/day **OR** diphenhydrAMINE (BENADRYL) injection 50 mg, Q6H PRN    hydrOXYzine HCL (ATARAX) tablet 100 mg, Q6H PRN Max 4/day **OR** LORazepam (ATIVAN) injection 2 mg, Q6H PRN    hydrOXYzine HCL (ATARAX) tablet 25 mg, Q6H PRN Max 4/day     melatonin tablet 3 mg, HS    OLANZapine (ZyPREXA) tablet 5 mg, Q4H PRN Max 3/day **OR** OLANZapine (ZyPREXA) IM injection 2.5 mg, Q4H PRN Max 3/day    OLANZapine (ZyPREXA) tablet 5 mg, Q3H PRN Max 3/day **OR** OLANZapine (ZyPREXA) IM injection 5 mg, Q3H PRN Max 3/day    OLANZapine (ZyPREXA) tablet 2.5 mg, Q4H PRN Max 6/day    propranolol (INDERAL) tablet 10 mg, Q8H PRN    [START ON 1/21/2025] risperiDONE (RisperDAL M-TAB) disintegrating tablet 0.5 mg, Daily    risperiDONE (RisperDAL M-TAB) disintegrating tablet 1 mg, HS    senna-docusate sodium (SENOKOT S) 8.6-50 mg per tablet 1 tablet, Daily PRN    traZODone (DESYREL) tablet 50 mg, HS PRN  Allergies   Allergen Reactions    Aspirin Angioedema    Nsaids Hives       Vital signs in last 24 hours:  Temp:  [98.1 °F (36.7 °C)-99.4 °F (37.4 °C)] 98.1 °F (36.7 °C)  HR:  [91-99] 91  BP: (116-128)/(56-68) 128/56  Resp:  [16] 16  SpO2:  [95 %-99 %] 99 %  O2 Device: None (Room air)  No intake or output data in the 24 hours ending 01/20/25 0808    Screenings:  Nutrition Screening: Nutrition Assessment (completed by Staff): Nutrition  Feeding: Able to feed self  Diet Type: Regular/House  Appetite: Good  Diet Supplements: None    Pain Screening: Pain Screening: Pain Assessment  Pain Assessment Tool: 0-10  Pain Score: 0    Suicide Screening:  See above    Laboratory results:  I have personally reviewed all pertinent laboratory/tests results.      Mental Status Evaluation:  Appearance:  age appropriate, bearded, casually dressed, and disheveled   Behavior:  psychomotor retardation with limited eye contact   Speech:  soft and scant with slight paucity of speech   Mood:  anxious, depressed, and dysthymic   Affect:  blunted   Language: naming objects and repeating phrases   Thought Process:  perserverative   Thought Content:  delusions  Latter-day preoccupation, paranoid/persecutory delusions   Perceptual Disturbances: None although appears internally preoccupied at times   Risk  Potential: Suicidal Ideations none, Homicidal Ideations none, and Potential for Aggression No   Sensorium:  person, place, and time/date   Cognition:  recent and remote memory grossly intact   Consciousness:  alert and awake    Attention: attention span appeared shorter than expected for age   Intellect: not examined   Fund of Knowledge: vocabulary: Appears appropriate for education level   Insight:  limited   Judgment: limited   Muscle Strength and Tone: Appears appropriate   Gait/Station: normal gait/station and normal balance   Motor Activity: no abnormal movements     Memory: Short and long term memory fair     Inpatient Psychiatric Certification:     Certification: Estimated length of stay: More than 2 midnights.  Risks / Benefits of Treatment:     Risks, benefits, and possible side effects of medications explained to patient. The patient verbalizes understanding and agreement for treatment.     Counseling / Coordination of Care:     Patient's presentation on admission and proposed treatment plan discussed with treatment team.  Diagnosis, medication changes and treatment plan reviewed with patient.  Recent stressors discussed with patient..  Precipitating episode leading to admission reviewed with patient.  Importance of medication and treatment compliance reviewed with patient.          Code Status: Level 1 - Full Code    Advance Directive and Living Will:        Power of :      POLST:      Counseling/Coordination of Care    I have expended greater than 30 minutes in which more than 50% of this time was expended in counseling/coordination of patient care relating to diagnostic results, prognosis, potential risks and benefits of management options, instructions for appropriate management, patient and/or collateral education, importance of adherence to management and/or risk factor reductions. Patient's rights, confidentiality, exceptions to confidentiality, use of electronic medical record including  appropriate staff access to medical record regarding behavioral health services and consent to treatment were reviewed.    Note Share:    This note was not shared with the patient due to reasonable likelihood of causing patient harm    This note has been constructed using a voice recognition system. There may be translation, syntax,  or grammatical errors. If you have any questions, please contact the dictating provider.    Jordan Christopher Holter, DO 01/20/25

## 2025-01-20 NOTE — SOCIAL WORK
"Admission Status    Status of admission 201   [facility]      Patient Intake   Address to discharge to [address] [place] [address] [address]   [place] [address]    Living Arrangement Lives with father    Can patient return home Yes   Patient's Telephone Number [phone]    Patient's e-mail Address [email or web address] ([email or web address])    Insurance BLUE CROSS/CAPITAL BC PLAN [id number]    PCP ALPHONSO Nesbitt      General - Internal Medicine     [phone]      Education GED   Type of work Unemployed     History Denies   Access to Firearms Denies    Marital Status/Children Single, No kids    Spirituality/Holiness Jehovah's witness   Transportation Take public trans- Bus    Preferred Pharmacy [id number] - [place], [place] - [address]      Patient History   Presenting Problem Patient presents to [facility] ED on 1/18/2025 via EMS. Parents found him in a life-threatening situation with a knife. Per his parents patient started acting strangely around Thanksgiving time, he has been religiously preoccupied, he has been screaming at the isaac, he is not sleeping or doing self-care. Patient has no past medical history. Patient denies any medications at home denies any tobacco use alcohol use or drug use.    Stressor/Trigger \"I don't know... I am not sure\"   Treatment History First hospitalization   Current psychiatrist/therapist Denies    ACT/ICM Denies    Family History of Mental Health Denies    Suicide Attempts Hannah.. the other day... knife but could not do anything   Legal Issues Denies    Trauma/Psychosocial loss Denies      Substance Abuse Assessment   UDS: Negative  Audit Score:    Nicotine/Tobacco: Denies   Substance First use Last Use and amount Frequency Amount Used How long Longest period of sobriety and when Method of use   THC   Denies         Heroin   Denies         Cocaine   Denies         ETOH   Denies          Meth            Benzos            Other:            History of RADHA Denies "    Family History of RADHA Denies    Prior Inpatient RADHA Treatment Denies   Current Outpatient treatment Denies    Response to Referral N/A     Referrals/ROIs   Referrals Needed [facility]   ROIs Signed [facility]

## 2025-01-20 NOTE — CMS CERTIFICATION NOTE
Certification: Based upon physical, mental and social evaluations, I certify that inpatient psychiatric services continue to be medically necessary for this patient for a duration of greater than 2 midnights for the treatment of  Major depressive disorder, recurrent, severe with psychotic features (HCC) Available alternative community resources still do not meet the patient's mental health care needs. I further attest that an established written individualized plan of care has been updated and is outlined in the patient's medical records.    Jordan Christopher Holter, DO

## 2025-01-20 NOTE — NURSING NOTE
"Pt denies SI/HI/AH/VH but appears internally preoccupied. Present in milieu but is mostly isolative to his room. Pt refused medication r/t concerns of side effects. Pt asked if there was another medication he could take. Educated Pt on side effects of current medication. Pt asked if there was a medication to stop \" over thinking\". Educated pt on use of prn's and to discuss possible medication change with Dr. Pt denied need for Prn at this time. Pt more visible this afternoon and was social with peers. Compliant with Lunch. No further concerns as of present. Plan of care ongoing.   "

## 2025-01-20 NOTE — PROGRESS NOTES
01/20/25 0902   Team Meeting   Meeting Type Daily Rounds   Team Members Present   Team Members Present Physician;Nurse;   Physician Team Member Holter   Nursing Team Member Shraddha   Care Management Team Member Ronald   Patient/Family Present   Patient Present No   Patient's Family Present No     201. Pt was admitted due passive SI and MH declining since thanksgiving. Pt has not been on any treatment. Pt is med/meal complaint. Pt will discharge once stable.

## 2025-01-20 NOTE — ED NOTES
Insurance Authorization for admission:   [employer] form submitted via [employer]  Pending Auth # [id number]

## 2025-01-20 NOTE — NURSING NOTE
Patient visible at start of shift. Cooperative with routine. Denied any unmet needs. HS medication compliant. Requested and received Trazodone 50 mg po prn at 2044.     At 2144, patient observed resting in bed with his eyes closed. Trazodone 50 mg po prn effective.

## 2025-01-21 PROCEDURE — 99233 SBSQ HOSP IP/OBS HIGH 50: CPT | Performed by: NURSE PRACTITIONER

## 2025-01-21 RX ORDER — RISPERIDONE 1 MG/1
1 TABLET, ORALLY DISINTEGRATING ORAL DAILY
Status: DISCONTINUED | OUTPATIENT
Start: 2025-01-22 | End: 2025-01-24

## 2025-01-21 RX ADMIN — RISPERIDONE 0.5 MG: 0.5 TABLET, ORALLY DISINTEGRATING ORAL at 08:17

## 2025-01-21 RX ADMIN — MELATONIN TAB 3 MG 3 MG: 3 TAB at 21:02

## 2025-01-21 RX ADMIN — FOLIC ACID 1 MG: 1 TABLET ORAL at 08:17

## 2025-01-21 RX ADMIN — ERGOCALCIFEROL 50000 UNITS: 1.25 CAPSULE ORAL at 08:17

## 2025-01-21 RX ADMIN — RISPERIDONE 1 MG: 1 TABLET, ORALLY DISINTEGRATING ORAL at 21:02

## 2025-01-21 NOTE — PROGRESS NOTES
Progress Note - Behavioral Health     Jah Morejon Jr. 24 y.o. male MRN: [id number]   Unit/Bed#: [facility] 384-02 Encounter: [id number]          Assessment & Plan  Major depressive disorder, recurrent, severe with psychotic features (HCC)  Continue  Risperdal 0.5 mg daily and 1 mg nightly to address psychosis.  Upward titration as necessary and as tolerated.  Will increase AM dose to 1 mg. Can consider transition to Invega for introduction of Invega Sustenna if outpatient compliance becomes a concern  Orders from past 72 hours:    Inpatient consult for Medical Clearance for  patient; Standing    Medical clearance for psychiatric admission    No associated orders from this encounter found during lookback period of 72 hours.        Recommended Treatment:     All current active medications have been reviewed  Encourage group therapy, milieu therapy and occupational therapy  Behavioral Health checks for safety monitoring  72 hour notice 72 Hour Notice: Initiated yesterday  Will observe if safe for discharge once 72 hour notice expires  Will observe if need to initiate involuntary commitment    Current medications:  Current Facility-Administered Medications   Medication Dose Route Frequency Provider Last Rate    acetaminophen  650 mg Oral Q6H PRN Regla Lagos MD      acetaminophen  650 mg Oral Q4H PRN Regla Lagos MD      acetaminophen  975 mg Oral Q6H PRN Regla Lagos MD      aluminum-magnesium hydroxide-simethicone  30 mL Oral Q4H PRN Regla Lagos MD      benztropine  1 mg Intramuscular Q4H PRN Max 6/day Regla Lagos MD      benztropine  1 mg Oral Q4H PRN Max 6/day Regla Lagos MD      [START ON 1/23/2025] cyanocobalamin  1,000 mcg Oral Daily ALPHONSO Gonsalez      cyanocobalamin  1,000 mcg Intramuscular Once ALPHONSO Gonsalez      hydrOXYzine HCL  50 mg Oral Q6H PRN Max 4/day Regla Lagos MD      Or    diphenhydrAMINE  50 mg Intramuscular Q6H PRN Regla  MD Demond      ergocalciferol  50,000 Units Oral Weekly ALPHONSO Gonsalez      folic acid  1 mg Oral Daily ALPHONSO Gonsalez      hydrOXYzine HCL  100 mg Oral Q6H PRN Max 4/day Regla Lagos MD      Or    LORazepam  2 mg Intramuscular Q6H PRN Regla Lagos MD      hydrOXYzine HCL  25 mg Oral Q6H PRN Max 4/day Regla Lagos MD      melatonin  3 mg Oral HS Regla Lagos MD      OLANZapine  5 mg Oral Q4H PRN Max 3/day Regla Lagos MD      Or    OLANZapine  2.5 mg Intramuscular Q4H PRN Max 3/day Regla Lagos MD      OLANZapine  5 mg Oral Q3H PRN Max 3/day Regla Lagos MD      Or    OLANZapine  5 mg Intramuscular Q3H PRN Max 3/day Regla Lagos MD      OLANZapine  2.5 mg Oral Q4H PRN Max 6/day Regla Lagos MD      propranolol  10 mg Oral Q8H PRN Regla Lagos MD      risperiDONE  0.5 mg Oral Daily Jordan C Holter, DO      risperiDONE  1 mg Oral HS Jordan C Holter, DO      senna-docusate sodium  1 tablet Oral Daily PRN Regla Lagos MD      traZODone  50 mg Oral HS PRN Regla Lagos MD           Risks / Benefits of Treatment:    Risks, benefits, and possible side effects of medications explained to patient. Patient has limited understanding of risks and benefits of treatment at this time, but agrees to take medications as prescribed.      Subjective:    Documentation, nursing notes, medication reconciliation, labs, and vitals reviewed. Patient was seen for continuing care and reviewed with treatment team. No acute events over the past 24 hours.  Per nursing report, has been isolative and guarded.  At times noted rocking back and forth.  Appears preoccupied and distraught.  Reluctant with medications and education provided.  Did sign a 72-hour notice yesterday.    Medication changes over the past 24 hours: Risperdal  started and titrated up. Continues to tolerate current medications with no adverse effects.     On evaluation today, he is  "seen in his room sitting on the side of the bed and reading the Bible.  Soft spoken and initially reluctant to talk.  States he signed a 72-hour notice because he does not need to be here, not willing to reconsider at this time.  Presents religiously preoccupied and convinced \"I did something terribly wrong \".  Stating he offended God and is now being punished, will not share what he thinks he did wrong.  Also admits to profound depression, passive death wish but no active suicidal ideation.  Poor insight and does not believe he has psychiatric illness or need psychiatric medications.  He is willing to continue to take Risperdal.  Admits to feeling hopeless and helpless.  Admits to occasional auditory hallucinations, but will not share the content of voices.  Did sleep better last night with Risperdal.    No suicidal ideation, plan, or intent.  Admits to perceptual disturbances and does not exhibit any symptoms of shahnaz on evaluation.    Psychiatric ROS:  Behavior over the last 24 hours: unchanged  Sleep: slept better  Appetite: fair  Medication side effects: No   ROS: no complaints, all other systems are negative    Mental Status Evaluation:    Appearance:  marginal hygiene   Behavior:  guarded   Speech:  scant   Mood:  dysphoric   Affect:  constricted   Thought Process:  decreased rate of thoughts   Associations: concrete associations, perseverative   Thought Content:  persecutory delusions, Mu-ism preoccupation, negative thoughts, intrusive thoughts, ruminating thoughts   Perceptual Disturbances: auditory hallucinations   Risk Potential: Suicidal ideation - Yes, passive death wish, No active suicidal ideation  Homicidal ideation - None  Potential for aggression - No   Sensorium:  oriented to person, place, and time/date   Memory:  recent and remote memory grossly intact   Consciousness:  alert and awake   Attention/Concentration: poor concentration and poor attention span   Insight:  poor   Judgment: poor "   Gait/Station: normal gait/station, normal balance   Motor Activity: no abnormal movements     Vital signs in last 24 hours:    Temp:  [97.6 °F (36.4 °C)] 97.6 °F (36.4 °C)  HR:  [50-77] 65  BP: (135-142)/(57-61) 142/61  Resp:  [16] 16  SpO2:  [96 %-98 %] 96 %  O2 Device: None (Room air)    Laboratory results: I have personally reviewed all pertinent laboratory/tests results    Results from the past 24 hours: No results found for this or any previous visit (from the past 24 hours).    Suicide/Homicide Risk Assessment:    Risk of Harm to Self:   Current Specific Risk Factors include: recent suicidal ideation, passive death wishes  Protective Factors: able to contract for safety on the unit, taking medications as ordered on the unit  Based on today's assessment, Jah presents the following risk of harm to self: low    Risk of Harm to Others:  Nursing Homicide Risk Assessment: Violence Risk to Others: Denies within past 6 months  Based on today's assessment, Jah presents the following risk of harm to others: none    The following interventions are recommended: Behavioral Health checks for safety monitoring, continued hospitalization on locked unit    Progress Toward Goals: no significant improvement    Counseling / Coordination of Care:    Patient's progress discussed with staff in treatment team meeting.  Medications, treatment progress and treatment plan reviewed with patient.  Medication changes discussed with patient.  Medication education provided to patient.  Reassurance and supportive therapy provided.  Encouraged participation in milieu and group therapy on the unit.    ALPHONSO Hayes 01/21/25    This note has been constructed using a voice recognition system. There may be translation, syntax, or grammatical errors. If you have any questions, please contact the dictating author.

## 2025-01-21 NOTE — PROGRESS NOTES
01/21/25 0841   Team Meeting   Meeting Type Daily Rounds   Team Members Present   Team Members Present Physician;Nurse;   Physician Team Member Holter   Nursing Team Member Sloiz   Care Management Team Member Ronald   Patient/Family Present   Patient Present No   Patient's Family Present No     201. Pt signed 72 hr notice at noon. Pt refused some of his meds. Pt is isolative to self and his room. Pt reports AH but denies all symptoms. Pt will discharge once stable.

## 2025-01-21 NOTE — NURSING NOTE
Pt mother called looking for update which could not be given due to lack of signed JOI by patient.  Discussion held with pt concerning JOI which he is presently not interested in signing for anyone.

## 2025-01-21 NOTE — PLAN OF CARE
Problem: DEPRESSION  Goal: Will be euthymic at discharge  Description: INTERVENTIONS:  - Administer medication as ordered  - Provide emotional support via 1:1 interaction with staff  - Encourage involvement in milieu/groups/activities  - Monitor for social isolation  Outcome: Not Progressing     Problem: PSYCHOSIS  Goal: Will report no hallucinations or delusions  Description: Interventions:  - Administer medication as  ordered  - Every waking shifts and PRN assess for the presence of hallucinations and or delusions  - Assist with reality testing to support increasing orientation  - Assess if patient's hallucinations or delusions are encouraging self-harm or harm to others and intervene as appropriate  Outcome: Progressing     Problem: Ineffective Coping  Goal: Participates in unit activities  Description: Interventions:  - Provide therapeutic environment   - Provide required programming   - Redirect inappropriate behaviors   Outcome: Not Progressing     Problem: Ineffective Coping  Goal: Participates in unit activities  Description: Interventions:  - Provide therapeutic environment   - Provide required programming   - Redirect inappropriate behaviors   Outcome: Not Progressing     Problem: DISCHARGE PLANNING - CARE MANAGEMENT  Goal: Discharge to post-acute care or home with appropriate resources  Description: INTERVENTIONS:  - Conduct assessment to determine patient/family and health care team treatment goals, and need for post-acute services based on payer coverage, community resources, and patient preferences, and barriers to discharge  - Address psychosocial, clinical, and financial barriers to discharge as identified in assessment in conjunction with the patient/family and health care team  - Arrange appropriate level of post-acute services according to patient’s   needs and preference and payer coverage in collaboration with the physician and health care team  - Communicate with and update the  patient/family, physician, and health care team regarding progress on the discharge plan  - Arrange appropriate transportation to post-acute venues  Outcome: Progressing

## 2025-01-21 NOTE — NURSING NOTE
"Pt compliant with HS medications,  denies HI/SI/AVH. Pt presently appears distraught,  sitting in his room with his head in his hands.  Pt states \"it's too complicated to talk about\".  Pt encouraged to reach out if he needs someone to talk to,  pt denies any thoughts of self harm.  15 min safety checks maintained.    "

## 2025-01-21 NOTE — ASSESSMENT & PLAN NOTE
Continue  Risperdal 0.5 mg daily and 1 mg nightly to address psychosis.  Upward titration as necessary and as tolerated.  Will increase AM dose to 1 mg. Can consider transition to Invega for introduction of Invega Sustenna if outpatient compliance becomes a concern  Orders from past 72 hours:    Inpatient consult for Medical Clearance for BH patient; Standing

## 2025-01-22 ENCOUNTER — TELEPHONE (OUTPATIENT)
Age: 25
End: 2025-01-22

## 2025-01-22 PROBLEM — Z00.8 MEDICAL CLEARANCE FOR PSYCHIATRIC ADMISSION: Status: RESOLVED | Noted: 2025-01-19 | Resolved: 2025-01-22

## 2025-01-22 PROCEDURE — 99232 SBSQ HOSP IP/OBS MODERATE 35: CPT | Performed by: NURSE PRACTITIONER

## 2025-01-22 RX ORDER — RISPERIDONE 2 MG/1
2 TABLET, ORALLY DISINTEGRATING ORAL
Status: DISCONTINUED | OUTPATIENT
Start: 2025-01-22 | End: 2025-01-24

## 2025-01-22 RX ORDER — LORAZEPAM 1 MG/1
2 TABLET ORAL ONCE
Status: COMPLETED | OUTPATIENT
Start: 2025-01-22 | End: 2025-01-22

## 2025-01-22 RX ADMIN — RISPERIDONE 2 MG: 2 TABLET, ORALLY DISINTEGRATING ORAL at 21:15

## 2025-01-22 RX ADMIN — RISPERIDONE 1 MG: 1 TABLET, ORALLY DISINTEGRATING ORAL at 08:27

## 2025-01-22 RX ADMIN — MELATONIN TAB 3 MG 3 MG: 3 TAB at 21:15

## 2025-01-22 RX ADMIN — FOLIC ACID 1 MG: 1 TABLET ORAL at 08:28

## 2025-01-22 RX ADMIN — HYDROXYZINE HYDROCHLORIDE 50 MG: 50 TABLET, FILM COATED ORAL at 16:11

## 2025-01-22 RX ADMIN — LORAZEPAM 2 MG: 1 TABLET ORAL at 11:06

## 2025-01-22 NOTE — NURSING NOTE
"Pt present in dayroom and milieu. Denies SI/HI/AH/VH but appears internally preoccupied. Medication and meal compliant. Pt appears depressed and anxious. Pt reports that not being able to be outside is making him feel worse. Pt was later reported to not being doing well by a peer. Upon entering Pt's room pt observed kneeling on floor with his head down into his hands and a blanket covering his head. Pt was visibly trembling but was reluctant to speak. Pt denied SI/HI/AH/VH at this time. Peer attempted to talk to Pt and Pt refused.  and Dr came into room pt continued to sit in same position. Pt refused PRN's for anxiety when offered and asked to just be left alone. Pt encouraged to sit on the bed and Pt complied. Pt continued to repeat \" why me\". Pt received Lorazepam  2 mg PO 1 x dose at 1106. 1206 pt is observed in dayroom sitting with peers. Pt is calm and no longer appears anxious. Pt reports \" this is the first time a medication has helped stop my racing thoughts\". Compliant with Lunch. No further concerns as of present. Plan of care ongoing.   "

## 2025-01-22 NOTE — PROGRESS NOTES
01/22/25 0905   Team Meeting   Meeting Type Daily Rounds   Team Members Present   Team Members Present Physician;Nurse;   Physician Team Member Holter   Nursing Team Member [facility] Management Team Member Ronald   Patient/Family Present   Patient Present No   Patient's Family Present No     201. Pt signed 72 hr notice, expiring tomorrow. Pt is med/ meal complaint. Pt denies all symptoms. Pt is calm and cooperative.  Pt will discharge once stable.

## 2025-01-22 NOTE — PLAN OF CARE
Problem: DEPRESSION  Goal: Will be euthymic at discharge  Description: INTERVENTIONS:  - Administer medication as ordered  - Provide emotional support via 1:1 interaction with staff  - Encourage involvement in milieu/groups/activities  - Monitor for social isolation  Outcome: Progressing     Problem: Ineffective Coping  Goal: Participates in unit activities  Description: Interventions:  - Provide therapeutic environment   - Provide required programming   - Redirect inappropriate behaviors   Outcome: Progressing     Problem: Ineffective Coping  Goal: Participates in unit activities  Description: Interventions:  - Provide therapeutic environment   - Provide required programming   - Redirect inappropriate behaviors   Outcome: Progressing     Problem: DISCHARGE PLANNING - CARE MANAGEMENT  Goal: Discharge to post-acute care or home with appropriate resources  Description: INTERVENTIONS:  - Conduct assessment to determine patient/family and health care team treatment goals, and need for post-acute services based on payer coverage, community resources, and patient preferences, and barriers to discharge  - Address psychosocial, clinical, and financial barriers to discharge as identified in assessment in conjunction with the patient/family and health care team  - Arrange appropriate level of post-acute services according to patient’s   needs and preference and payer coverage in collaboration with the physician and health care team  - Communicate with and update the patient/family, physician, and health care team regarding progress on the discharge plan  - Arrange appropriate transportation to post-acute venues  Outcome: Progressing     Problem: PSYCHOSIS  Goal: Will report no hallucinations or delusions  Description: Interventions:  - Administer medication as  ordered  - Every waking shifts and PRN assess for the presence of hallucinations and or delusions  - Assist with reality testing to support increasing orientation  -  Assess if patient's hallucinations or delusions are encouraging self-harm or harm to others and intervene as appropriate  Outcome: Not Progressing

## 2025-01-22 NOTE — PROGRESS NOTES
01/22/25 1538   Team Meeting   Meeting Type Tx Team Meeting   Team Members Present   Team Members Present Physician;Nurse;   Physician Team Member Holter   Nursing Team Member [place]   Care Management Team Member Ronald   Patient/Family Present   Patient Present No   Patient's Family Present No     Tx plan was reviewed and discussed with Pt. Pt was encouraged to attend groups. Medication was discussed with Pt. Pt signed tx plan.

## 2025-01-22 NOTE — NURSING NOTE
"1711 Pt reports \" I think it worked\" when asked how he was feeling after taking Hydroxyzine. Pt appears calm and relaxed but is preoccupied with discussing How Ativan worked for him and would like to have it again  when he has racing thoughts.   "

## 2025-01-22 NOTE — TELEPHONE ENCOUNTER
Patients father Jah Harvye Called in with concerns regarding patient being a [facility], he is on communication consent. Jah harvey Stated when he spoke to patient, he was concerned over who was going to pay for his stay. Advised patients dad that insurance approved 4 days and would be reviewed 1/24 but could not see how much of stay patients insurance covered. Advised patients dad he could call insurance member services and they may be able to advise him on the benefits patient has and what all can be covered. Patients father stated the insurance is through patients mother and he did not want to call and mess anything up, he then asked if patient had a  he could speak with in regard to finances for patient and how all treatment would be covered even after discharge. Spoke with Vikki office clerical and confirmed unfortunately we cannot see  records and advised patients father to call the floor he is on at [facility] and provided a phone number from directory he can try [phone] for 3rd floor behavioral health ([address]) as patients father stated that's where patient is. Patients father stated he is going to try contacting them for further information and also asked if Elena would know of anyway for him to get in contact with a  for patient.    Please advise, thank you

## 2025-01-22 NOTE — NURSING NOTE
Pt visible in room at start of shift and later social with peers in dayroom. Pleasant on approach and denies SI/HI/AVH. Appears anxious and preoccupied at times. Pt was told his mother called and was uninterested in calling her back. Pt resistive to take Risperdal at HS but took with education/encouragement. Pt inquired about medication for sleep and was educated about his scheduled medication and PRN meds available. Pt denies further needs or concerns. Safety checks ongoing.

## 2025-01-22 NOTE — PROGRESS NOTES
Progress Note - Behavioral Health     Jah Morejon Jr. 24 y.o. male MRN: [id number]   Unit/Bed#: [facility] 384-02 Encounter: [id number]          Assessment & Plan  Major depressive disorder, recurrent, severe with psychotic features (HCC)  Increase Risperdal 1 mg daily and 2 mg nightly to address psychosis.  Upward titration as necessary and as tolerated.  Can consider transition to Invega for introduction of Invega Sustenna if outpatient compliance becomes a concern  No associated orders from this encounter found during lookback period of 72 hours.          Recommended Treatment:     All current active medications have been reviewed  Encourage group therapy, milieu therapy and occupational therapy  Behavioral Health checks for safety monitoring  Patient rescinded 72-hour notice today    Current medications:  Current Facility-Administered Medications   Medication Dose Route Frequency Provider Last Rate    acetaminophen  650 mg Oral Q6H PRN Regla Lagos MD      acetaminophen  650 mg Oral Q4H PRN Regla Lagos MD      acetaminophen  975 mg Oral Q6H PRN Regla Lagos MD      aluminum-magnesium hydroxide-simethicone  30 mL Oral Q4H PRN Regla Lagos MD      benztropine  1 mg Intramuscular Q4H PRN Max 6/day Regla Lagos MD      benztropine  1 mg Oral Q4H PRN Max 6/day Regla Lagos MD      [START ON 1/23/2025] cyanocobalamin  1,000 mcg Oral Daily ALPHONSO Gonsalez      cyanocobalamin  1,000 mcg Intramuscular Once ALPHONSO Gonsalez      hydrOXYzine HCL  50 mg Oral Q6H PRN Max 4/day Regla Lagos MD      Or    diphenhydrAMINE  50 mg Intramuscular Q6H PRN Regla Lagos MD      ergocalciferol  50,000 Units Oral Weekly ALPHONSO Gonsalez      folic acid  1 mg Oral Daily ALPHONSO Gonsalez      hydrOXYzine HCL  100 mg Oral Q6H PRN Max 4/day Regla Lagos MD      Or    LORazepam  2 mg Intramuscular Q6H PRN Regla Lagos MD      hydrOXYzine HCL  25 mg  "Oral Q6H PRN Max 4/day Regla Lagos MD      melatonin  3 mg Oral HS Regla Lagos MD      OLANZapine  5 mg Oral Q4H PRN Max 3/day Regla Lagos MD      Or    OLANZapine  2.5 mg Intramuscular Q4H PRN Max 3/day Regla Lagos MD      OLANZapine  5 mg Oral Q3H PRN Max 3/day Regla Lagos MD      Or    OLANZapine  5 mg Intramuscular Q3H PRN Max 3/day Regla Lagos MD      OLANZapine  2.5 mg Oral Q4H PRN Max 6/day Regla Lagos MD      propranolol  10 mg Oral Q8H PRN Regla Lagos MD      risperiDONE  1 mg Oral Daily ALPHONSO Hayes      risperiDONE  2 mg Oral HS ALPHONSO Hayes      senna-docusate sodium  1 tablet Oral Daily PRN Regla Lagos MD      traZODone  50 mg Oral HS PRN Regla Lagos MD           Risks / Benefits of Treatment:    Risks, benefits, and possible side effects of medications explained to patient. Patient has limited understanding of risks and benefits of treatment at this time, but agrees to take medications as prescribed.      Subjective:    Documentation, nursing notes, medication reconciliation, labs, and vitals reviewed. Patient was seen for continuing care and reviewed with treatment team. No acute events over the past 24 hours.  Per nursing report, has been isolative and guarded.  Internally preoccupied.  Scant in conversation.    Medication changes over the past 24 hours: Continue to titrate up on his Risperdal as tolerated for psychosis. Continues to tolerate current medications with no adverse effects.     On evaluation today, he is seen in the small consultation room.  Required as needed Ativan for severe anxiety, or now.  He is agreeable to rescind the 72-hour notice but continues to state he cannot stay here.  States he does not want a 302 on his record because he has thoughts of joining the .  Continues with [employer] preoccupation and paranoia.  Continues to state \"I did something wrong\"and feeling punished by God.  Anxious " and rocking back and forth.  Still denying psychiatric illness or need for psychiatric medications and feeling this is something he has brought on self.    No suicidal ideation, plan, or intent. Denies perceptual disturbances and does not exhibit any symptoms of shahnaz on evaluation.    Psychiatric ROS:  Behavior over the last 24 hours: unchanged  Sleep: slept better  Appetite: poor  Medication side effects: No   ROS: no complaints, all other systems are negative    Mental Status Evaluation:    Appearance:  marginal hygiene   Behavior:  guarded   Speech:  normal rate, normal volume   Mood:  dysphoric, anxious   Affect:  increased in intensity   Thought Process:  illogical   Associations: perseverative   Thought Content:  persecutory delusions, Gnosticism preoccupation   Perceptual Disturbances: denies auditory hallucinations when asked, appears preoccupied   Risk Potential: Suicidal ideation - None  Homicidal ideation - None  Potential for aggression - No   Sensorium:  oriented to person, place, and time/date   Memory:  recent and remote memory grossly intact   Consciousness:  alert and awake   Attention/Concentration: decreased concentration and decreased attention span   Insight:  poor   Judgment: poor   Gait/Station: normal gait/station, normal balance   Motor Activity: no abnormal movements     Vital signs in last 24 hours:    Temp:  [98.6 °F (37 °C)] 98.6 °F (37 °C)  HR:  [77] 77  BP: (130)/(65) 130/65  Resp:  [16] 16  SpO2:  [96 %] 96 %  O2 Device: None (Room air)    Laboratory results: I have personally reviewed all pertinent laboratory/tests results    Results from the past 24 hours: No results found for this or any previous visit (from the past 24 hours).    Suicide/Homicide Risk Assessment:    Risk of Harm to Self:   Nursing Suicide Risk Assessment Last 24 hours: C-SSRS Risk (Since Last Contact)  Calculated C-SSRS Risk Score (Since Last Contact): No Risk Indicated  Based on today's assessment, Jah mendoza  the following risk of harm to self: minimal    Risk of Harm to Others:  Nursing Homicide Risk Assessment: Violence Risk to Others: Denies within past 6 months  Based on today's assessment, Jah presents the following risk of harm to others: minimal    The following interventions are recommended: Behavioral Health checks for safety monitoring, continued hospitalization on locked unit    Progress Toward Goals: no significant improvement    Counseling / Coordination of Care:    Patient's progress discussed with staff in treatment team meeting.  Medications, treatment progress and treatment plan reviewed with patient.  Medication changes discussed with patient.  Medication education provided to patient.  Reassurance and supportive therapy provided.  Encouraged participation in milieu and group therapy on the unit.    ALPHONSO Hayes 01/22/25    This note has been constructed using a voice recognition system. There may be translation, syntax, or grammatical errors. If you have any questions, please contact the dictating author.

## 2025-01-22 NOTE — NURSING NOTE
"Patient complaining of moderate anxiety. Approached med window with a post it note with Ativan 2mg written on it. Ativan 2mg PO was given earlier for patients' anxiety as a one time dose. Patient is asking for it to be scheduled. Explained to patient that this would be a conversation with his physician tomorrow. Patient requested something else for anxiety. Given PRN PO 50mg Atarax at this time for moderate anxiety related to his \"racing thoughts\". Regalado scale 23.  "

## 2025-01-22 NOTE — ASSESSMENT & PLAN NOTE
Increase Risperdal 1 mg daily and 2 mg nightly to address psychosis.  Upward titration as necessary and as tolerated.  Can consider transition to Invega for introduction of Invega Sustenna if outpatient compliance becomes a concern  No associated orders from this encounter found during lookback period of 72 hours.

## 2025-01-22 NOTE — PROGRESS NOTES
Admission Self Assessment for was completed, signed and given to this therapist.  Work focus as per form: assertiveness/communication, life skills, knowledge about mental health, knowledge about medication, healthy lifestyles and managing conflict

## 2025-01-23 PROBLEM — F41.1 GENERALIZED ANXIETY DISORDER WITH PANIC ATTACKS: Status: ACTIVE | Noted: 2025-01-23

## 2025-01-23 PROBLEM — F41.0 GENERALIZED ANXIETY DISORDER WITH PANIC ATTACKS: Status: ACTIVE | Noted: 2025-01-23

## 2025-01-23 PROCEDURE — 99233 SBSQ HOSP IP/OBS HIGH 50: CPT | Performed by: NURSE PRACTITIONER

## 2025-01-23 RX ORDER — CLONAZEPAM 0.5 MG/1
0.25 TABLET ORAL 2 TIMES DAILY
Status: DISCONTINUED | OUTPATIENT
Start: 2025-01-23 | End: 2025-01-27 | Stop reason: HOSPADM

## 2025-01-23 RX ORDER — LORAZEPAM 1 MG/1
1 TABLET ORAL EVERY 6 HOURS PRN
Status: DISCONTINUED | OUTPATIENT
Start: 2025-01-23 | End: 2025-01-27 | Stop reason: HOSPADM

## 2025-01-23 RX ADMIN — RISPERIDONE 2 MG: 2 TABLET, ORALLY DISINTEGRATING ORAL at 21:17

## 2025-01-23 RX ADMIN — MELATONIN TAB 3 MG 3 MG: 3 TAB at 21:17

## 2025-01-23 RX ADMIN — CLONAZEPAM 0.25 MG: 0.5 TABLET ORAL at 17:08

## 2025-01-23 RX ADMIN — CLONAZEPAM 0.25 MG: 0.5 TABLET ORAL at 10:05

## 2025-01-23 RX ADMIN — RISPERIDONE 1 MG: 1 TABLET, ORALLY DISINTEGRATING ORAL at 08:12

## 2025-01-23 RX ADMIN — CYANOCOBALAMIN TAB 1000 MCG 1000 MCG: 1000 TAB at 08:12

## 2025-01-23 RX ADMIN — FOLIC ACID 1 MG: 1 TABLET ORAL at 08:12

## 2025-01-23 NOTE — PLAN OF CARE
Problem: DEPRESSION  Goal: Will be euthymic at discharge  Description: INTERVENTIONS:  - Administer medication as ordered  - Provide emotional support via 1:1 interaction with staff  - Encourage involvement in milieu/groups/activities  - Monitor for social isolation  Outcome: Progressing     Problem: PSYCHOSIS  Goal: Will report no hallucinations or delusions  Description: Interventions:  - Administer medication as  ordered  - Every waking shifts and PRN assess for the presence of hallucinations and or delusions  - Assist with reality testing to support increasing orientation  - Assess if patient's hallucinations or delusions are encouraging self-harm or harm to others and intervene as appropriate  Outcome: Not Progressing     Problem: Ineffective Coping  Goal: Participates in unit activities  Description: Interventions:  - Provide therapeutic environment   - Provide required programming   - Redirect inappropriate behaviors   Outcome: Not Progressing

## 2025-01-23 NOTE — NURSING NOTE
Pt denies SI/HI/AH/VH but appears internally preoccupied. Present in dayroom and milieu. Minimally social with select peers. Pt appears depressed. Medication and meal compliant. Pt is religiously preoccupied. No further concerns as of present. Plan of care ongoing.

## 2025-01-23 NOTE — PROGRESS NOTES
01/23/25 0911   Team Meeting   Meeting Type Daily Rounds   Team Members Present   Team Members Present Physician;Nurse;   Physician Team Member Demond   Nursing Team Member Soliz   Care Management Team Member Ronald   Patient/Family Present   Patient Present No   Patient's Family Present No     201. Pt rescinded 72 hr notice. Pt had a panic attack and received PRN Ativan. Pt is calm and cooperative. Pt will discharge sometime early next week.

## 2025-01-23 NOTE — TELEPHONE ENCOUNTER
They have case workers in behavioral health. He can request to have one consulted to discuss finances.

## 2025-01-23 NOTE — TELEPHONE ENCOUNTER
Called patients father, he was notified and appreciates all the help he has gotten from the office.

## 2025-01-23 NOTE — PROGRESS NOTES
Progress Note - Behavioral Health     Jah Morejon Jr. 24 y.o. male MRN: [id number]   Unit/Bed#: [facility] 384-02 Encounter: [id number]          Assessment & Plan  Major depressive disorder, recurrent, severe with psychotic features (HCC)  Continue  Risperdal 1 mg daily and 2 mg nightly to address psychosis.  Upward titration as necessary and as tolerated.  Can consider transition to Invega for introduction of Invega Sustenna if outpatient compliance becomes a concern  No associated orders from this encounter found during lookback period of 72 hours.    Generalized anxiety disorder with panic attacks  Start clonazepam 0.25 mg PO BID        Recommended Treatment:     All current active medications have been reviewed  Encourage group therapy, milieu therapy and occupational therapy  Behavioral Health checks for safety monitoring    Current medications:  Current Facility-Administered Medications   Medication Dose Route Frequency Provider Last Rate    acetaminophen  650 mg Oral Q6H PRN Regla Lagos MD      acetaminophen  650 mg Oral Q4H PRN Regla Lagos MD      acetaminophen  975 mg Oral Q6H PRN Regla Lagos MD      aluminum-magnesium hydroxide-simethicone  30 mL Oral Q4H PRN Regla Lagos MD      benztropine  1 mg Intramuscular Q4H PRN Max 6/day Regla Lagos MD      benztropine  1 mg Oral Q4H PRN Max 6/day Regla Lagos MD      clonazePAM  0.25 mg Oral BID ALPHONSO Hayes      cyanocobalamin  1,000 mcg Oral Daily ALPHONSO Gonsalez      cyanocobalamin  1,000 mcg Intramuscular Once ALPHONSO Gonsalez      hydrOXYzine HCL  50 mg Oral Q6H PRN Max 4/day Regla Lagos MD      Or    diphenhydrAMINE  50 mg Intramuscular Q6H PRN Regla Lagos MD      ergocalciferol  50,000 Units Oral Weekly ALPHONSO Gonsalez      folic acid  1 mg Oral Daily ALPHONSO Gonsalez      hydrOXYzine HCL  25 mg Oral Q6H PRN Max 4/day Regla Lagos MD      LORazepam  1 mg Oral  "Q6H PRN ALPHONSO Hayes      melatonin  3 mg Oral HS Regla Lagos MD      OLANZapine  5 mg Oral Q4H PRN Max 3/day Regla Lagos MD      Or    OLANZapine  2.5 mg Intramuscular Q4H PRN Max 3/day Regla Lagos MD      OLANZapine  5 mg Oral Q3H PRN Max 3/day Regla Lagos MD      Or    OLANZapine  5 mg Intramuscular Q3H PRN Max 3/day Regla Lagos MD      OLANZapine  2.5 mg Oral Q4H PRN Max 6/day Regla Lagos MD      propranolol  10 mg Oral Q8H PRN Regla Lagos MD      risperiDONE  1 mg Oral Daily ALPHONSO Hayes      risperiDONE  2 mg Oral HS ALPHONSO Hayes      senna-docusate sodium  1 tablet Oral Daily PRN Regla Lagos MD      traZODone  50 mg Oral HS PRN Regla Lagos MD           Risks / Benefits of Treatment:    Risks, benefits, and possible side effects of medications explained to patient. Patient has limited understanding of risks and benefits of treatment at this time, but agrees to take medications as prescribed.      Subjective:    Documentation, nursing notes, medication reconciliation, labs, and vitals reviewed. Patient was seen for continuing care and reviewed with treatment team. No acute events over the past 24 hours.  Per nursing report, had panic symptoms yesterday and relieved with PRN ativan and noted \"that's the first time my racing thoughts stopped\".  Continue paranoid and religiously preoccupied.    Medication changes over the past 24 hours: Risperdal was increased for psychosis. Continues to tolerate current medications with no adverse effects.     On evaluation today, he is seen in his room and resting in bed.  Disheveled and minimal eye contact, soft spoken and brief.  Continues with persecutory delusions of a Methodist nature, states he does not believe risperidone \"is doing anything\", but tolerating without adverse effects.  Reviewed the addition of clonazepam for his anxiety symptoms.    No suicidal ideation, plan, or intent. Denies " perceptual disturbances and does not exhibit any symptoms of shahnaz on evaluation.    Psychiatric ROS:  Behavior over the last 24 hours: unchanged  Sleep: slept better  Appetite: fair  Medication side effects: No   ROS: no complaints, all other systems are negative    Mental Status Evaluation:    Appearance:  marginal hygiene   Behavior:  guarded   Speech:  slow, scant   Mood:  dysphoric, anxious   Affect:  increased in intensity, still at times tearful   Thought Process:  perseverative   Associations: concrete associations   Thought Content:  persecutory delusions, Sikh preoccupation   Perceptual Disturbances: denies auditory hallucinations when asked, appears preoccupied   Risk Potential: Suicidal ideation - Yes, passive death wish, No active suicidal ideation  Homicidal ideation - None  Potential for aggression - No   Sensorium:  oriented to person, place, and time/date   Memory:  recent and remote memory grossly intact   Consciousness:  alert and awake   Attention/Concentration: poor concentration and poor attention span   Insight:  poor   Judgment: poor   Gait/Station: normal gait/station, normal balance   Motor Activity: no abnormal movements     Vital signs in last 24 hours:    Temp:  [97.5 °F (36.4 °C)-98.5 °F (36.9 °C)] 97.5 °F (36.4 °C)  HR:  [96] 96  BP: (122-134)/(68-74) 122/68  Resp:  [17] 17  SpO2:  [97 %-98 %] 97 %  O2 Device: None (Room air)    Laboratory results: I have personally reviewed all pertinent laboratory/tests results    Results from the past 24 hours: No results found for this or any previous visit (from the past 24 hours).    Suicide/Homicide Risk Assessment:    Risk of Harm to Self:   Nursing Suicide Risk Assessment Last 24 hours: C-SSRS Risk (Since Last Contact)  Calculated C-SSRS Risk Score (Since Last Contact): No Risk Indicated  Based on today's assessment, Jah presents the following risk of harm to self: minimal    Risk of Harm to Others:  Nursing Homicide Risk Assessment:  Violence Risk to Others: Denies within past 6 months  Based on today's assessment, Jah presents the following risk of harm to others: minimal    The following interventions are recommended: Behavioral Health checks for safety monitoring, continued hospitalization on locked unit    Progress Toward Goals: limited improvement, still anxious, still psychotic    Counseling / Coordination of Care:    Patient's progress discussed with staff in treatment team meeting.  Medications, treatment progress and treatment plan reviewed with patient.  Medication changes discussed with patient.  Medication education provided to patient.  Reassurance and supportive therapy provided.  Encouraged participation in milieu and group therapy on the unit.    ALPHONSO Hayes 01/23/25    This note has been constructed using a voice recognition system. There may be translation, syntax, or grammatical errors. If you have any questions, please contact the dictating author.

## 2025-01-23 NOTE — ASSESSMENT & PLAN NOTE
Continue  Risperdal 1 mg daily and 2 mg nightly to address psychosis.  Upward titration as necessary and as tolerated.  Can consider transition to Invega for introduction of Invega Sustenna if outpatient compliance becomes a concern  No associated orders from this encounter found during lookback period of 72 hours.

## 2025-01-23 NOTE — NURSING NOTE
"Patient remains withdrawn to his room for the entirety of the evening and night. Upon assessment patient states he had a \"rough morning but ativan worked great\" Patient is requesting to have medication PRN, as it helps calm raising thoughts and anxiety. Patient denies all psych symptoms at this time including anxiety and depression and reports \"feeling ok:\". Compliant buy hesitant with nightly medication. Q 15 min rounding maintained for safety.     "

## 2025-01-24 PROCEDURE — 99232 SBSQ HOSP IP/OBS MODERATE 35: CPT | Performed by: NURSE PRACTITIONER

## 2025-01-24 RX ORDER — RISPERIDONE 2 MG/1
4 TABLET, ORALLY DISINTEGRATING ORAL
Status: DISCONTINUED | OUTPATIENT
Start: 2025-01-24 | End: 2025-01-27

## 2025-01-24 RX ADMIN — FOLIC ACID 1 MG: 1 TABLET ORAL at 08:11

## 2025-01-24 RX ADMIN — CLONAZEPAM 0.25 MG: 0.5 TABLET ORAL at 17:15

## 2025-01-24 RX ADMIN — CYANOCOBALAMIN TAB 1000 MCG 1000 MCG: 1000 TAB at 08:11

## 2025-01-24 RX ADMIN — RISPERIDONE 4 MG: 2 TABLET, ORALLY DISINTEGRATING ORAL at 21:19

## 2025-01-24 RX ADMIN — MELATONIN TAB 3 MG 3 MG: 3 TAB at 21:19

## 2025-01-24 RX ADMIN — CLONAZEPAM 0.25 MG: 0.5 TABLET ORAL at 08:11

## 2025-01-24 NOTE — PLAN OF CARE
Problem: DEPRESSION  Goal: Will be euthymic at discharge  Description: INTERVENTIONS:  - Administer medication as ordered  - Provide emotional support via 1:1 interaction with staff  - Encourage involvement in milieu/groups/activities  - Monitor for social isolation  Outcome: Progressing     Problem: PSYCHOSIS  Goal: Will report no hallucinations or delusions  Description: Interventions:  - Administer medication as  ordered  - Every waking shifts and PRN assess for the presence of hallucinations and or delusions  - Assist with reality testing to support increasing orientation  - Assess if patient's hallucinations or delusions are encouraging self-harm or harm to others and intervene as appropriate  Outcome: Progressing     Problem: Ineffective Coping  Goal: Participates in unit activities  Description: Interventions:  - Provide therapeutic environment   - Provide required programming   - Redirect inappropriate behaviors   Outcome: Progressing     Problem: DISCHARGE PLANNING - CARE MANAGEMENT  Goal: Discharge to post-acute care or home with appropriate resources  Description: INTERVENTIONS:  - Conduct assessment to determine patient/family and health care team treatment goals, and need for post-acute services based on payer coverage, community resources, and patient preferences, and barriers to discharge  - Address psychosocial, clinical, and financial barriers to discharge as identified in assessment in conjunction with the patient/family and health care team  - Arrange appropriate level of post-acute services according to patient’s   needs and preference and payer coverage in collaboration with the physician and health care team  - Communicate with and update the patient/family, physician, and health care team regarding progress on the discharge plan  - Arrange appropriate transportation to post-acute venues  Outcome: Progressing

## 2025-01-24 NOTE — NURSING NOTE
Pt calm and cooperative on approach. Flat and scant in conversation. Appears anxious and internally preoccupied. Denies SI/HI/AH/VH. Visible in the milieu, socializing more with peers and attending group activities. Medication and meal compliant. Focused on discharge. No current unmet needs.

## 2025-01-24 NOTE — PROGRESS NOTES
Progress Note - Behavioral Health     Jah Morejon Jr. 24 y.o. male MRN: [id number]   Unit/Bed#: [facility] 384-02 Encounter: [id number]          Assessment & Plan  Major depressive disorder, recurrent, severe with psychotic features (HCC)  Increase to  Risperdal 4 mg at bedtime only  to address psychosis.   Orders from past 72 hours:    Ambulatory referral to Psych Services; Future    Generalized anxiety disorder with panic attacks  Continue clonazepam 0.25 mg PO BID        Recommended Treatment:     Signed 72 hour notice today at 1150    Current medications:  Current Facility-Administered Medications   Medication Dose Route Frequency Provider Last Rate    acetaminophen  650 mg Oral Q6H PRN Regla Lagos MD      acetaminophen  650 mg Oral Q4H PRN Regla Lagos MD      acetaminophen  975 mg Oral Q6H PRN Regla Lagos MD      aluminum-magnesium hydroxide-simethicone  30 mL Oral Q4H PRN Regla Lagos MD      benztropine  1 mg Intramuscular Q4H PRN Max 6/day Regla Lagos MD      benztropine  1 mg Oral Q4H PRN Max 6/day Regla Lagos MD      clonazePAM  0.25 mg Oral BID ALPHONSO Hayes      cyanocobalamin  1,000 mcg Oral Daily ALPHONSO Gonsalez      cyanocobalamin  1,000 mcg Intramuscular Once ALPHONSO Gonsalez      hydrOXYzine HCL  50 mg Oral Q6H PRN Max 4/day Regla Lagos MD      Or    diphenhydrAMINE  50 mg Intramuscular Q6H PRN Regla Lagos MD      ergocalciferol  50,000 Units Oral Weekly ALPHONSO Gonsalez      folic acid  1 mg Oral Daily ALPHONSO Gonsalez      hydrOXYzine HCL  25 mg Oral Q6H PRN Max 4/day Regla Lagos MD      LORazepam  1 mg Oral Q6H PRN ALPHONSO Hayes      melatonin  3 mg Oral HS Regla Lagos MD      OLANZapine  5 mg Oral Q4H PRN Max 3/day Regla Lagos MD      Or    OLANZapine  2.5 mg Intramuscular Q4H PRN Max 3/day Regla Lagos MD      OLANZapine  5 mg Oral Q3H PRN Max 3/day Regla Lagos MD    "   Or    OLANZapine  5 mg Intramuscular Q3H PRN Max 3/day Regla Lagos MD      OLANZapine  2.5 mg Oral Q4H PRN Max 6/day Regla Lagos MD      propranolol  10 mg Oral Q8H PRN Regla Lagos MD      risperiDONE  4 mg Oral HS ALPHONSO Hayes      senna-docusate sodium  1 tablet Oral Daily PRN Regla Lagos MD      traZODone  50 mg Oral HS PRN Regla Lagos MD           Risks / Benefits of Treatment:    Risks, benefits, and possible side effects of medications explained to patient. Patient has limited understanding of risks and benefits of treatment at this time, but agrees to take medications as prescribed.      Subjective:    Documentation, nursing notes, medication reconciliation, labs, and vitals reviewed. Patient was seen for continuing care and reviewed with treatment team. No acute events over the past 24 hours.  Per nursing report, Flat and scant in conversation. Appears anxious and internally preoccupied .    Medication changes over the past 24 hours: Started low dose of clonazepam for his anxiety. Continues to tolerate current medications with no adverse effects.     On evaluation today, he did sign a 72-hour notice today stating \"I have to get out of here it is making me worse \".  Continues with Taoist preoccupation, and believing he is being punished for something he did to offend God.  States clonazepam helps with his ruminating thoughts.  Does not believe Risperdal is helping him yet, except to help him sleep.    No suicidal ideation, plan, or intent. Denies perceptual disturbances and does not exhibit any symptoms of shahnaz on evaluation.    Psychiatric ROS:  Behavior over the last 24 hours: unchanged  Sleep: slept off and on  Appetite: fair  Medication side effects: No   ROS: no complaints, all other systems are negative    Mental Status Evaluation:    Appearance:  marginal hygiene, thin & gaunt looking   Behavior:  guarded   Speech:  scant   Mood:  dysphoric   Affect:  " constricted   Thought Process:  decreased rate of thoughts   Associations: perseverative   Thought Content:  persecutory delusions, Faith preoccupation   Perceptual Disturbances: denies auditory hallucinations when asked, appears preoccupied   Risk Potential: Suicidal ideation - None  Homicidal ideation - None  Potential for aggression - No   Sensorium:  oriented to person, place, and time/date   Memory:  recent and remote memory grossly intact   Consciousness:  alert and awake   Attention/Concentration: poor concentration and poor attention span   Insight:  poor   Judgment: poor   Gait/Station: normal gait/station, normal balance   Motor Activity: no abnormal movements     Vital signs in last 24 hours:    Temp:  [97.7 °F (36.5 °C)-98.4 °F (36.9 °C)] 97.7 °F (36.5 °C)  HR:  [78-92] 78  BP: (132-133)/(66-73) 133/73  Resp:  [16] 16  SpO2:  [96 %-97 %] 96 %  O2 Device: None (Room air)    Laboratory results: I have personally reviewed all pertinent laboratory/tests results    Results from the past 24 hours: No results found for this or any previous visit (from the past 24 hours).    Suicide/Homicide Risk Assessment:    Risk of Harm to Self:   Nursing Suicide Risk Assessment Last 24 hours: C-SSRS Risk (Since Last Contact)  Calculated C-SSRS Risk Score (Since Last Contact): No Risk Indicated  Based on today's assessment, Jah presents the following risk of harm to self: minimal    Risk of Harm to Others:  Nursing Homicide Risk Assessment: Violence Risk to Others: Denies within past 6 months  Based on today's assessment, Jah presents the following risk of harm to others: minimal    The following interventions are recommended: Behavioral Health checks for safety monitoring, continued hospitalization on locked unit    Progress Toward Goals: no significant improvement    Counseling / Coordination of Care:    Patient's progress discussed with staff in treatment team meeting.  Medications, treatment progress and treatment  plan reviewed with patient.  Medication changes discussed with patient.  Medication education provided to patient.  Reassurance and supportive therapy provided.  Encouraged participation in milieu and group therapy on the unit.    ALPHONSO Hayes 01/24/25    This note has been constructed using a voice recognition system. There may be translation, syntax, or grammatical errors. If you have any questions, please contact the dictating author.

## 2025-01-24 NOTE — PROGRESS NOTES
01/24/25 0913   Team Meeting   Meeting Type Daily Rounds   Team Members Present   Team Members Present Physician;Nurse;   Physician Team Member Holter   Nursing Team Member [facility] Team Member Ronald   Patient/Family Present   Patient Present No   Patient's Family Present No     201. Pt is med/ meal complaint. Pt denies all symptoms and observed to be internally preoccupied. Pt is calm and visible. Pt will discharge once stable.

## 2025-01-24 NOTE — NURSING NOTE
"Patient denies SI, HI, AHs and VHs. Denies anxiety and depression. Patient refused AM risperidone. Patient states \"it doesn't work.. my problem is that I have racing thoughts.\" Patient education provided on medication as well as the important of compliance. Patient is meal compliant. Visible on the unit and social with peers. He denies any needs at this time.  "

## 2025-01-24 NOTE — CASE MANAGEMENT
CM contacted pt's father Eber Morejon Sr at [phone] to follow up. CM provided some updates related to diagnosis and medications. CM inquired about events that lead to this hospitalization and CM was informed that pt has been reporting bad thoughts about God and that God would not forgive him for something that he did but would not elaborate. Pt's dad shared that he heard son asking for forgiveness during one of his last communications with pt while being in the hospital. CM was informed that pt's father is also Caodaism and that is concerned about pt's paranoia related to God's being upset with him. CM was informed that patient is not very social and has limited education. Pt has been working in getting [employer] and is supposed to be meeting with a recruited on the  29 th but would not provide his mother with his phone pin to reschedule this appointment. CM was asked if she could have a conversation with pt and encourage him to contact mother to provide that information and CM agreed that she will go over that with him but ultimately would be his decision.     CM was informed that patient has reported to them that he has had some SI in the past and CM shared that he shared that with her but that he did not have a plan. Pt's parents have been supportive towards him but are concerned because he has not been acting like himself, has not income, does not have a social life, and is delusional and not willing to follow up with providers. CM explained that she will be connecting patient with outpatient providers and that she will discuss that with him closer to discharge. CM was informed that patient is not ready to go back home yet. CM asked pt's father to track pt's mood and share if he reports any symptoms with him over the weekend. CM agreed in relating this information with providers. CM was informed that patient has no understanding on how finances work and does not have any income. CM was informed that  they are concerned as to how they will be able to cover if he were to receive a bill from the hospital. Pt's father agreed in following up with [facility] at [facility] to complete to get funds, if needed.

## 2025-01-24 NOTE — CASE MANAGEMENT
CM submitted internal referral for medication management services and psychotherapy services. Appointment to be scheduled.

## 2025-01-24 NOTE — ASSESSMENT & PLAN NOTE
Increase to  Risperdal 4 mg at bedtime only  to address psychosis.   Orders from past 72 hours:    Ambulatory referral to Psych Services; Future

## 2025-01-24 NOTE — CASE MANAGEMENT
CM contacted [facility] at [phone]  to schedule intake appointment. AUSTIN was informed that CM has to call back with the patient on the line. CM agreed.

## 2025-01-24 NOTE — NURSING NOTE
"Patient's father called to report patient began to decompensate before Thanksgiving, stating that \"God will not forgive him for something.\" Patient has been pre-occupied with this thought for months. States he overhears \"God won't forgive him for thinking about bad thoughts about him [God].\" Patient only has an education level of 8th grade by the time he was 19, but he did obtain his GED. On aptitude tests he had an disability related to retention. Father also states he does not grasp the concept of finances and bills.  "

## 2025-01-25 PROCEDURE — 99232 SBSQ HOSP IP/OBS MODERATE 35: CPT | Performed by: STUDENT IN AN ORGANIZED HEALTH CARE EDUCATION/TRAINING PROGRAM

## 2025-01-25 RX ADMIN — CYANOCOBALAMIN TAB 1000 MCG 1000 MCG: 1000 TAB at 08:24

## 2025-01-25 RX ADMIN — CLONAZEPAM 0.25 MG: 0.5 TABLET ORAL at 08:24

## 2025-01-25 RX ADMIN — MELATONIN TAB 3 MG 3 MG: 3 TAB at 21:00

## 2025-01-25 RX ADMIN — RISPERIDONE 4 MG: 2 TABLET, ORALLY DISINTEGRATING ORAL at 21:00

## 2025-01-25 RX ADMIN — CLONAZEPAM 0.25 MG: 0.5 TABLET ORAL at 18:32

## 2025-01-25 RX ADMIN — FOLIC ACID 1 MG: 1 TABLET ORAL at 08:24

## 2025-01-25 NOTE — NURSING NOTE
Patient remained visible and social on the unit throughout the evening. Pleasant and cooperative with routine. Denied any unmet needs. HS medication compliant.

## 2025-01-25 NOTE — PROGRESS NOTES
"Progress Note - Behavioral Health   Name: Jah Morejon Jr. 24 y.o. male I MRN: [id number]  Unit/Bed#: -01 I Date of Admission: 1/19/2025   Date of Service: 1/25/2025 I Hospital Day: 6        Assessment & Plan  Major depressive disorder, recurrent, severe with psychotic features (HCC)  Risperdal 4 mg at bedtime for psychosis.   Orders from past 72 hours:    Ambulatory referral to Psych Services; Future    Generalized anxiety disorder with panic attacks  Continue clonazepam 0.25 mg PO BID       Additional Recommendations:  Behavioral Health checks for safety monitoring.  Continue treatment with group therapy, milieu therapy and occupational therapy.  Medical management per SLIM.  Discharge and disposition planning.    ----------------------------------------      Subjective: Per nursing report patient noted to be visible and social in the unit.  Pleasant and cooperative.  Compliant with medications and meals.    On exam today he states that his mood is \"pretty good\".  He reports that he is \"ready to get out of here\".  He reports that he feels as though the current medications are helpful, denying today that he is having any auditory or visual hallucinations.  When questioned about God and his previous Sikh preoccupations he did not express any Sikh based delusions.  He reports that his appetite levels have been adequate on the unit and he enjoys eating the hospital food.  Denies any issues with sleep and feels that he has been well rested as of late.  No SI or HI reported. We discussed rescinding 72 hour notice though he declined.    Behavior over the last 24 hours:  unchanged  Sleep: normal  Appetite: normal  Medication side effects: No  ROS: no complaints and all other systems are negative    Mental Status Evaluation:  Appearance:  casually dressed, bearded   Behavior:  pleasant, cooperative, calm   Speech:  normal rate and volume   Mood:  \"Good\"   Affect:  constricted   Thought Process:  " organized, coherent, decreased rate of thoughts   Associations: intact associations   Thought Content:  Less Hindu preoccupation   Perceptual Disturbances: no auditory hallucinations, no visual hallucinations, does not appear responding to internal stimuli   Risk Potential: Suicidal ideation - None at present  Homicidal ideation - None at present  Potential for aggression - No   Sensorium:  oriented to person, place, and time/date   Memory:  recent and remote memory grossly intact   Consciousness:  alert and awake   Attention/Concentration: attention span and concentration appear shorter than expected for age   Insight:  fair   Judgment: limited   Gait/Station: normal gait/station   Motor Activity: no abnormal movements     Medications: all current active meds have been reviewed and continue current psychiatric medications.  Current Facility-Administered Medications   Medication Dose Route Frequency Provider Last Rate    acetaminophen  650 mg Oral Q6H PRN Regla Lagos MD      acetaminophen  650 mg Oral Q4H PRN Regla Lagos MD      acetaminophen  975 mg Oral Q6H PRN Regla Lagos MD      aluminum-magnesium hydroxide-simethicone  30 mL Oral Q4H PRN Regla Lagos MD      benztropine  1 mg Intramuscular Q4H PRN Max 6/day Regla Lagos MD      benztropine  1 mg Oral Q4H PRN Max 6/day Regla Lagos MD      clonazePAM  0.25 mg Oral BID ALPHONSO Hayes      cyanocobalamin  1,000 mcg Oral Daily ALPHONSO Gonsalez      cyanocobalamin  1,000 mcg Intramuscular Once ALPHONSO Gonsalez      hydrOXYzine HCL  50 mg Oral Q6H PRN Max 4/day Regla Lagos MD      Or    diphenhydrAMINE  50 mg Intramuscular Q6H PRN Regla Lagos MD      ergocalciferol  50,000 Units Oral Weekly ALPHONSO Gonsalez      folic acid  1 mg Oral Daily ALPHONSO Gonsalez      hydrOXYzine HCL  25 mg Oral Q6H PRN Max 4/day Regla Lagos MD      LORazepam  1 mg Oral Q6H PRN Nahed  ALPHONSO Nichols      melatonin  3 mg Oral HS Regla Lagos MD      OLANZapine  5 mg Oral Q4H PRN Max 3/day Regla Lagos MD      Or    OLANZapine  2.5 mg Intramuscular Q4H PRN Max 3/day Regla Lagos MD      OLANZapine  5 mg Oral Q3H PRN Max 3/day Regla Lagos MD      Or    OLANZapine  5 mg Intramuscular Q3H PRN Max 3/day Regla Lagos MD      OLANZapine  2.5 mg Oral Q4H PRN Max 6/day Regla Lagos MD      propranolol  10 mg Oral Q8H PRN Regla Lagos MD      risperiDONE  4 mg Oral HS ALPHONSO Hayes      senna-docusate sodium  1 tablet Oral Daily PRN Regla Lagos MD      traZODone  50 mg Oral HS PRN Regla Lagos MD         Labs: I have personally reviewed all pertinent laboratory/tests results  Results from the past 24 hours: No results found for this or any previous visit (from the past 24 hours).    Progress Toward Goals: progressing    Risks / Benefits of Treatment:    Risks, benefits, and possible side effects of medications explained to patient and patient verbalizes understanding and agreement for treatment.    Counseling / Coordination of Care:    Total floor / unit time spent today 35 minutes. Greater than 50% of total time was spent with the patient and / or family counseling and / or coordination of care. A description of counseling / coordination of care:  Patient's progress discussed with staff in treatment team meeting.  Medications, treatment progress and treatment plan reviewed with patient.  Reassurance and supportive therapy provided.  Encouraged participation in milieu and group therapy on the unit.    Jason Pierre MD 01/25/25

## 2025-01-25 NOTE — NURSING NOTE
Observed sitting in the milieu for breakfast amongst peers and using the patient telephone. Appears and sounds anxious during conversation, received scheduled medication. Denies SI,HI, and hallucinations of any kind.    Medication complaint.    Encouraged groups.    Staff availability reinforced.

## 2025-01-25 NOTE — ASSESSMENT & PLAN NOTE
Risperdal 4 mg at bedtime for psychosis.   Orders from past 72 hours:    Ambulatory referral to Psych Services; Future

## 2025-01-26 PROCEDURE — 99232 SBSQ HOSP IP/OBS MODERATE 35: CPT | Performed by: STUDENT IN AN ORGANIZED HEALTH CARE EDUCATION/TRAINING PROGRAM

## 2025-01-26 RX ADMIN — FOLIC ACID 1 MG: 1 TABLET ORAL at 08:50

## 2025-01-26 RX ADMIN — CLONAZEPAM 0.25 MG: 0.5 TABLET ORAL at 08:49

## 2025-01-26 RX ADMIN — MELATONIN TAB 3 MG 3 MG: 3 TAB at 21:22

## 2025-01-26 RX ADMIN — CYANOCOBALAMIN TAB 1000 MCG 1000 MCG: 1000 TAB at 08:50

## 2025-01-26 RX ADMIN — RISPERIDONE 4 MG: 2 TABLET, ORALLY DISINTEGRATING ORAL at 21:22

## 2025-01-26 RX ADMIN — CLONAZEPAM 0.25 MG: 0.5 TABLET ORAL at 17:18

## 2025-01-26 NOTE — NURSING NOTE
"Patient visible in the milieu most of the morning. Cooperative. Denies depression, anxiety, SI, HI, and hallucinations of any kind. Says they feel \"good\".    Medication compliant.    Encouraged groups.    No unmet needs/concerns at this time.   "

## 2025-01-26 NOTE — PROGRESS NOTES
"Progress Note - Behavioral Health   Name: Jah Morejon Jr. 24 y.o. male I MRN: [id number]  Unit/Bed#: -01 I Date of Admission: 2025   Date of Service: 2025 I Hospital Day: 7        Assessment & Plan  Major depressive disorder, recurrent, severe with psychotic features (HCC)  Risperdal 4 mg at bedtime for psychosis.   Orders from past 72 hours:    Ambulatory referral to Psych Services; Future    Generalized anxiety disorder with panic attacks  Continue clonazepam 0.25 mg PO BID       Additional Recommendations:  Behavioral Health checks for safety monitoring.  Continue treatment with group therapy, milieu therapy and occupational therapy.  Medical management per SLIM.  Discharge and disposition planning.    ----------------------------------------      Subjective: Per nursing report patient noted be pleasant and visible in the unit.  Social with peers.  Compliant with medications and meals.    Patient remains preoccupied about discharge, stating that 72-hour notice will  tomorrow.  He states that he is looking forward to returning home and getting \"fresh air\".  He denies any major concerns or issues at this time.  Denies any suicidal or homicidal ideations.  Denies any auditory or visual hallucinations.  States that he is tolerating the medications well without side effect.     Behavior over the last 24 hours:  unchanged  Sleep: normal  Appetite: normal  Medication side effects: No  ROS: no complaints and all other systems are negative    Mental Status Evaluation:  Appearance:  casually dressed   Behavior:  guarded   Speech:  normal rate and volume   Mood:  euthymic   Affect:  constricted   Thought Process:  organized, logical, coherent, goal directed   Associations: intact associations   Thought Content:  no overt delusions   Perceptual Disturbances: no auditory hallucinations, no visual hallucinations, does not appear responding to internal stimuli   Risk Potential: Suicidal ideation - None " at present  Homicidal ideation - None at present  Potential for aggression - No   Sensorium:  oriented to person, place, and time/date   Memory:  recent and remote memory grossly intact   Consciousness:  alert and awake   Attention/Concentration: attention span and concentration appear shorter than expected for age   Insight:  fair   Judgment: fair   Gait/Station: normal gait/station   Motor Activity: no abnormal movements     Medications: all current active meds have been reviewed.  Current Facility-Administered Medications   Medication Dose Route Frequency Provider Last Rate    acetaminophen  650 mg Oral Q6H PRN Regla Lagos MD      acetaminophen  650 mg Oral Q4H PRN Regla Lagos MD      acetaminophen  975 mg Oral Q6H PRN Regla Lagos MD      aluminum-magnesium hydroxide-simethicone  30 mL Oral Q4H PRN Regla Lagos MD      benztropine  1 mg Intramuscular Q4H PRN Max 6/day Regla Lagos MD      benztropine  1 mg Oral Q4H PRN Max 6/day Regla Lagos MD      clonazePAM  0.25 mg Oral BID ALPHONSO Hayes      cyanocobalamin  1,000 mcg Oral Daily ALPHONSO Gonsalez      cyanocobalamin  1,000 mcg Intramuscular Once ALPHONSO Gonsalez      hydrOXYzine HCL  50 mg Oral Q6H PRN Max 4/day Regla Lagos MD      Or    diphenhydrAMINE  50 mg Intramuscular Q6H PRN Regla Lagos MD      ergocalciferol  50,000 Units Oral Weekly ALPHONSO Gonsalez      folic acid  1 mg Oral Daily ALPHONSO Gonsalez      hydrOXYzine HCL  25 mg Oral Q6H PRN Max 4/day Regla Lagos MD      LORazepam  1 mg Oral Q6H PRN ALPHONSO Hayes      melatonin  3 mg Oral HS Regla Lagos MD      OLANZapine  5 mg Oral Q4H PRN Max 3/day Regla Lagos MD      Or    OLANZapine  2.5 mg Intramuscular Q4H PRN Max 3/day Regla Lagos MD      OLANZapine  5 mg Oral Q3H PRN Max 3/day Regla Lagos MD      Or    OLANZapine  5 mg Intramuscular Q3H PRN Max 3/day Regla  MD Demond      OLANZapine  2.5 mg Oral Q4H PRN Max 6/day Regla Lagos MD      propranolol  10 mg Oral Q8H PRN Regla Lagos MD      risperiDONE  4 mg Oral HS ALPHONSO Hayes      senna-docusate sodium  1 tablet Oral Daily PRN Regla Lagos MD      traZODone  50 mg Oral HS PRN Regla Lagos MD         Labs: I have personally reviewed all pertinent laboratory/tests results    No results found for this or any previous visit (from the past 24 hours).    Risks / Benefits of Treatment:    Risks, benefits, and possible side effects of medications explained to patient and patient verbalizes understanding and agreement for treatment.    Counseling / Coordination of Care:    Total floor / unit time spent today 25 minutes. Greater than 50% of total time was spent with the patient and / or family counseling and / or coordination of care. A description of counseling / coordination of care:  Patient's progress discussed with staff in treatment team meeting.  Medications, treatment progress and treatment plan reviewed with patient.  Reassurance and supportive therapy provided.  Encouraged participation in milieu and group therapy on the unit.    Jason Pierre MD 01/26/25

## 2025-01-27 ENCOUNTER — TELEPHONE (OUTPATIENT)
Age: 25
End: 2025-01-27

## 2025-01-27 VITALS
RESPIRATION RATE: 16 BRPM | BODY MASS INDEX: 21.18 KG/M2 | OXYGEN SATURATION: 98 % | HEART RATE: 92 BPM | HEIGHT: 69 IN | SYSTOLIC BLOOD PRESSURE: 117 MMHG | TEMPERATURE: 97.9 F | WEIGHT: 143 LBS | DIASTOLIC BLOOD PRESSURE: 66 MMHG

## 2025-01-27 PROCEDURE — 99239 HOSP IP/OBS DSCHRG MGMT >30: CPT | Performed by: NURSE PRACTITIONER

## 2025-01-27 RX ORDER — FOLIC ACID 1 MG/1
1 TABLET ORAL DAILY
Qty: 30 TABLET | Refills: 0 | Status: SHIPPED | OUTPATIENT
Start: 2025-01-28

## 2025-01-27 RX ORDER — RISPERIDONE 4 MG/1
4 TABLET ORAL
Qty: 30 TABLET | Refills: 1 | Status: SHIPPED | OUTPATIENT
Start: 2025-01-27

## 2025-01-27 RX ORDER — RISPERIDONE 2 MG/1
4 TABLET ORAL
Status: DISCONTINUED | OUTPATIENT
Start: 2025-01-27 | End: 2025-01-27 | Stop reason: HOSPADM

## 2025-01-27 RX ORDER — CLONAZEPAM 0.5 MG/1
0.25 TABLET ORAL 2 TIMES DAILY
Qty: 30 TABLET | Refills: 1 | Status: SHIPPED | OUTPATIENT
Start: 2025-01-27

## 2025-01-27 RX ORDER — ERGOCALCIFEROL 1.25 MG/1
50000 CAPSULE, LIQUID FILLED ORAL WEEKLY
Qty: 4 CAPSULE | Refills: 0 | Status: SHIPPED | OUTPATIENT
Start: 2025-01-28 | End: 2025-03-12

## 2025-01-27 RX ADMIN — CYANOCOBALAMIN TAB 1000 MCG 1000 MCG: 1000 TAB at 08:19

## 2025-01-27 RX ADMIN — CLONAZEPAM 0.25 MG: 0.5 TABLET ORAL at 08:19

## 2025-01-27 RX ADMIN — FOLIC ACID 1 MG: 1 TABLET ORAL at 08:19

## 2025-01-27 NOTE — DISCHARGE INSTR - APPOINTMENTS
Behavioral Health Nurse Navigator, Laura or Marleen will be calling you after your discharge, on the phone number that you provided.  They will be available as an additional support, if needed.   If you wish to speak with Laura, you may contact her at [phone].

## 2025-01-27 NOTE — DISCHARGE SUMMARY
"Discharge Summary - Behavioral Health   Jah Morejon Jr. 24 y.o. male MRN: [id number]  Unit/Bed#: [facility] 381-01 Encounter: [id number]     Admission Date: 1/19/2025         Discharge Date: 1/27/2025    Attending Psychiatrist: Jordan C Holter, DO    Assessment & Plan  Major depressive disorder, recurrent, severe with psychotic features (HCC)    Orders from past 72 hours:    Ambulatory referral to Psych Services; Future    risperiDONE (RisperDAL) 4 mg tablet; Take 1 tablet (4 mg total) by mouth daily at bedtime    Generalized anxiety disorder with panic attacks      Orders from past 72 hours:    Ambulatory referral to Psych Services; Future    clonazePAM (KlonoPIN) 0.5 mg tablet; Take 0.5 tablets (0.25 mg total) by mouth 2 (two) times a day      Reason for Admission/HPI:     Patient is a 24-year-old male admitted on a 201 voluntary admission due to new onset of psychosis and worsening of depression with suicidal ideation.    According to admission report from Dr. Holter:  Jah Morejon Jr. is a 24 y.o., overtly appearing  male, possessing remote psychiatric history of possible unspecified mood disorder, presenting to [facility] behavioral health 3P as a 201, subsequent to worsening signs/symptoms of psychosis including paranoid/persecutory delusions, Latter day preoccupation in addition to worsening dysphoric mood including depression and depressive signs/symptoms that include suicidal ideation with particular plan involving a knife     Per case management (Marylu David) on 01/19: \"24 year old male presents for worsening psychiatric health that includes suicidal ideation.   There is confusion among reported line of events.  Per 911 report, father, and  accounts; patient admitted to having thoughts of suicide and a plan to kill himself this evening with a knife.  Patient's father on scene reports he saw the patient with a large sharp serrated house " "prior this evening. Father reports he was able to remove the knife from the patient's possession.   Upon speaking with the father myself, he says this is not what happened.  He noticed a knife was missing from knife rack and instead it was on the table. He says patient did NOT have a knife.     EMS reports patient speaking to self in ambulance and asking providers unsettling questions regarding death and survival after death.Collateral obtained from father states patient has exhibited psychiatric issues and they culminated around Thanksgiving. Patient is acting strangely, is extremely religiously preoccupied, screaming at the isaac to forgive him among other things, not eating, not sleeping, not taking care of himself, and today was yelling/screaming/not making sense. Regarding SI, dad says at some point he told mother. There is no current self harm, or other SI/HI.  Unknown to if/what extent patient has AH/VH.   Does mumble and speak to himself. No psychiatrist/no therapist/no medications/one [facility] when a child but no known schizophrenia history.  Mother does have bipolar.  Patient did have one therapy session this month which was [facility] and they said patient needs help.  Negative medical history such as seizures, diabetes, trauma, etc. Significant disturbance in ADLS, eating, sleeping.  Denies substance use, no legal issues, no weapons, unable to obtain psych trauma. In ED, patient is disheveled, withdrawn and does not talk much if it all.  After much time, patient does talk about some things. Not aggressive or unpleasant in ED.      Patient initially not wanting to go with a 201 but then when father was going to 302 and he learned the consequences, he signed a 201.\"      Presently, patient adamantly denies suicidal/homicidal ideation in addition to thoughts of self-injury, although acknowledges previous suicidal ideation with particular plan involving a knife, dorinda for safety in " "the inpatient setting, appearing receptive to crisis planning provided by this writer.  He admits to dysphoric mood including depression and anxiety, stating that this is related to a \"mistake\", referencing an evil thoughts about God, appearing religiously preoccupied throughout evaluation, although patient denies additional psychiatric complaint/concerns including instances of panic, signs/symptoms of shahnaz/hypomania and psychosis.  At conclusion of evaluation, patient is agreeable to continuation of previously prescribed psychotropic medication        Past Medical History:   Diagnosis Date    Concussion 03/24/2015    Depression 02/23/2017    Fatigue 02/23/2017    GERD (gastroesophageal reflux disease)     as a child    Gynecomastia 11/10/2017    Hiatal hernia     as a child     No past surgical history on file.    Discharge Medications:  Current Discharge Medication List        START taking these medications    Details   clonazePAM (KlonoPIN) 0.5 mg tablet Take 0.5 tablets (0.25 mg total) by mouth 2 (two) times a day  Qty: 30 tablet, Refills: 1    Associated Diagnoses: Generalized anxiety disorder with panic attacks      cyanocobalamin (VITAMIN B-12) 1000 MCG tablet Take 1 tablet (1,000 mcg total) by mouth daily  Qty: 30 tablet, Refills: 0    Associated Diagnoses: Vitamin deficiency, unspecified      ergocalciferol (VITAMIN D2) 50,000 units Take 1 capsule (50,000 Units total) by mouth once a week for 7 doses  Qty: 4 capsule, Refills: 0    Associated Diagnoses: Vitamin deficiency, unspecified      folic acid (FOLVITE) 1 mg tablet Take 1 tablet (1 mg total) by mouth daily  Qty: 30 tablet, Refills: 0    Associated Diagnoses: Vitamin deficiency, unspecified      melatonin 3 mg Take 1 tablet (3 mg total) by mouth daily at bedtime  Qty: 30 tablet, Refills: 0    Associated Diagnoses: Insomnia, unspecified type      risperiDONE (RisperDAL) 4 mg tablet Take 1 tablet (4 mg total) by mouth daily at bedtime  Qty: 30 tablet, " Refills: 1    Associated Diagnoses: Major depressive disorder, recurrent, severe with psychotic features (HCC)              Current Discharge Medication List           Current Discharge Medication List           Current Discharge Medication List           Allergies:     Allergies   Allergen Reactions    Aspirin Angioedema    Nsaids Hives       Objective     Vital signs in last 24 hours:    Temp:  [97.9 °F (36.6 °C)-98.9 °F (37.2 °C)] 97.9 °F (36.6 °C)  HR:  [92-97] 92  BP: (117-123)/(64-66) 117/66  Resp:  [16] 16  SpO2:  [98 %] 98 %  O2 Device: None (Room air)    No intake or output data in the 24 hours ending 01/27/25 0942    Hospital Course:     Jah was admitted to the inpatient psychiatric unit and started on Behavioral Health checks for safety monitoring. During the hospitalization he was encouraged to attend individual therapy, group therapy, milieu therapy and occupational therapy.    Psychiatric medications were started during the hospital stay. To address psychotic symptoms, Jah was treated with antipsychotic medication Risperdal. Medication doses were adjusted to Risperdal 4 mg at bedtime and clonazepam 0.25 mg p.o. twice daily was added for anxiety symptoms  during the hospital course.  With these medication adjustments patient gradually improved prior to beginning of treatment medications risks and benefits and possible side effects including risk of parkinsonian symptoms, Tardive Dyskinesia and metabolic syndrome related to treatment with antipsychotic medications and risks of misuse, abuse or dependence, sedation and respiratory depression related to treatment with benzodiazepine medications were reviewed with Jah. He verbalized understanding and agreement for treatment. Upon admission Jah was seen by medical service for medical clearance for inpatient treatment and medical follow up.    Jah's symptoms gradually improved over the hospital course. Initially after admission he was still feeling  depressed, anxious, and psychotic. With adjustment of medications and therapeutic milieu his symptoms gradually improved. At the end of treatment Jah was improved. His mood was more stable at the time of discharge. Jah denied suicidal ideation, intent or plan at the time of discharge and denied homicidal ideation, intent or plan at the time of discharge. Auditory hallucinations were significantly improved and not command in nature. Behavior was appropriate on the unit at the time of discharge. Sleep and appetite were improved. Jah was tolerating medications and was not reporting any significant side effects at the time of discharge.    Since Jah was doing well at the end of the hospitalization, treatment team felt that Jah could be safely discharged to outpatient care. Jah signed 72 hour notice and requested discharge. At the time of the 72 hour notice expiration Jah had no criteria for involuntary commitment and denied any suicidal or homicidal ideation.    The outpatient follow up with therapist and psychiatrist at [facility] was arranged by the unit  upon discharge.    Mental Status at Time of Discharge:     Appearance:  marginal hygiene   Behavior:  guarded   Speech:  scant   Mood:  dysphoric   Affect:  blunted   Thought Process:  goal directed   Associations: intact associations   Thought Content:  Alevism preoccupation   Perceptual Disturbances: denies auditory hallucinations when asked, does not appear responding to internal stimuli   Risk Potential: Suicidal ideation - None  Homicidal ideation - None  Potential for aggression - No   Sensorium:  oriented to person, place, and time/date   Memory:  recent and remote memory grossly intact   Consciousness:  alert and awake   Attention/Concentration: decreased concentration and decreased attention span   Insight:  limited   Judgment: limited   Gait/Station: normal gait/station, normal balance   Motor Activity: no  abnormal movements       Suicide/Homicide Risk Assessment:    Risk of Harm to Self:   The following ratings are based on assessment at the time of discharge  Demographic risk factors include: , never , age: young adult (15-24)  Historical Risk Factors include: history of depression  Current Specific Risk Factors include: recent inpatient psychiatric admission - being discharged today  Protective Factors: no current suicidal ideation, improved mood, improved anxiety symptoms, improved psychotic symptoms  Weapons/Firearms: none. The following steps have been taken to ensure weapons are properly secured: not applicable  Based on today's assessment, Jah presents the following risk of harm to self: minimal    Risk of Harm to Others:  The following ratings are based on assessment at the time of discharge  Demographic Risk Factors include: male, unemployed, 16-25 years of age.  Historical Risk Factors include: none.  Current Specific Risk Factors include: recent episode of mood instability  Protective Factors: no current homicidal ideation, improved mood, compliant with medications, compliant with treatment, willing to continue psychiatric treatment  Weapons/Firearms: none. The following steps have been taken to ensure weapons are properly secured: not applicable  Based on today's assessment, Jah presents the following risk of harm to others: minimal    The following interventions are recommended: outpatient follow up with a psychiatrist, outpatient follow up with a therapist      Laboratory Results: I have personally reviewed all pertinent laboratory/tests results    Results from the past 24 hours: No results found for this or any previous visit (from the past 24 hours).    Discharge Medications:    See after visit summary for all reconciled discharge medications provided to patient and family.      Discharge instructions/Information to patient and family:     See after visit summary for information  provided to patient and family.      Provisions for Follow-Up Care:    See after visit summary for information related to follow-up care and any pertinent home health orders.      Discharge Statement:    I spent 35 minutes discharging the patient. This time was spent on the day of discharge. I had direct contact with the patient on the day of discharge.     Additional documentation is required if more than 30 minutes were spent on discharge:    I reviewed with Jah importance of compliance with medications and outpatient treatment after discharge.  I discussed the medication regimen and possible side effects of the medications with Jah prior to discharge. At the time of discharge he was tolerating psychiatric medications.  I discussed outpatient follow up with Jah.  I reviewed with Jah crisis plan and safety plan upon discharge.  Jah signed 72 hour notice and requested discharge. At the time of the 72 hour notice expiration he had no criteria for involuntary commitment and denied any suicidal or homicidal ideation.  No records found for controlled prescriptions according to [place] Prescription Drug Monitoring Program.    Discharge on Two Antipsychotic Medications : No    ALPHONSO Hayes 01/27/25

## 2025-01-27 NOTE — PROGRESS NOTES
01/27/25 0914   Team Meeting   Meeting Type Daily Rounds   Team Members Present   Team Members Present Physician;Nurse;   Physician Team Member Holter   Nursing Team Member [facility] Management Team Member Ronald   Patient/Family Present   Patient Present No   Patient's Family Present No     201. Pt will discharge today at 2 PM.

## 2025-01-27 NOTE — PLAN OF CARE
Problem: DEPRESSION  Goal: Will be euthymic at discharge  Description: INTERVENTIONS:  - Administer medication as ordered  - Provide emotional support via 1:1 interaction with staff  - Encourage involvement in milieu/groups/activities  - Monitor for social isolation  Outcome: Completed     Problem: PSYCHOSIS  Goal: Will report no hallucinations or delusions  Description: Interventions:  - Administer medication as  ordered  - Every waking shifts and PRN assess for the presence of hallucinations and or delusions  - Assist with reality testing to support increasing orientation  - Assess if patient's hallucinations or delusions are encouraging self-harm or harm to others and intervene as appropriate  Outcome: Completed     Problem: Ineffective Coping  Goal: Participates in unit activities  Description: Interventions:  - Provide therapeutic environment   - Provide required programming   - Redirect inappropriate behaviors   Outcome: Completed     Problem: DISCHARGE PLANNING - CARE MANAGEMENT  Goal: Discharge to post-acute care or home with appropriate resources  Description: INTERVENTIONS:  - Conduct assessment to determine patient/family and health care team treatment goals, and need for post-acute services based on payer coverage, community resources, and patient preferences, and barriers to discharge  - Address psychosocial, clinical, and financial barriers to discharge as identified in assessment in conjunction with the patient/family and health care team  - Arrange appropriate level of post-acute services according to patient’s   needs and preference and payer coverage in collaboration with the physician and health care team  - Communicate with and update the patient/family, physician, and health care team regarding progress on the discharge plan  - Arrange appropriate transportation to post-acute venues  Outcome: Completed

## 2025-01-27 NOTE — CASE MANAGEMENT
CM contacted pt's father Jah Morejon  at [phone] to provide up updates. CM provided him with updates and was able to answer all of his questions. AUSTIN was able to go over discharge planning. Pt's father will be picking the patient up at 1:30 PM.

## 2025-01-27 NOTE — NURSING NOTE
AVS summary reviewed with pt, verbalized understanding. All belongings accounted for. Medications sent to pt pharmacy. Taken off the unit with tech to meet dad in the lobby for pickup.

## 2025-01-27 NOTE — NURSING NOTE
Patient has been visible in the milieu most of the evening shift. Social with select peers.     Appears anxious and incongruent, this writer asked a couple of times if patient was okay, both times patient expressed they were. Was observed sitting with hands over their face, patient expressed they were talking to themself/God. Denies SI, HI, and hallucinations of any kind.    Medication compliant.    Staff availability reinforced.

## 2025-01-27 NOTE — ASSESSMENT & PLAN NOTE
Orders from past 72 hours:    Ambulatory referral to Psych Services; Future    clonazePAM (KlonoPIN) 0.5 mg tablet; Take 0.5 tablets (0.25 mg total) by mouth 2 (two) times a day

## 2025-01-27 NOTE — CASE MANAGEMENT
CM contacted [facility] at [phone]  to schedule intake appointment. Appointment was scheduled for 02/03 at 11:45 AM located at:  [address] [place] [address]       Fax- [phone]

## 2025-01-27 NOTE — NURSING NOTE
Pt visible on the unit. Withdrawn to self. Denies SI/HI/AH/VH. Medication and meal compliant. No current unmet needs.

## 2025-01-27 NOTE — CASE MANAGEMENT
Patient signed JOI for [facility] and [place] mentor. Patient refused to sign JOI for [facility] First episode psychosis program. CM printed out [facility] brochure and added it to his take- home folder as well as his AVS.

## 2025-01-27 NOTE — CASE MANAGEMENT
CM contacted to [facility] at [phone] to follow up on internal referral. Appointment was scheduled for May 1st at 10 AM for the [place] location: [address] [facility] [address] [place] [address].

## 2025-01-27 NOTE — ASSESSMENT & PLAN NOTE
Orders from past 72 hours:    Ambulatory referral to Psych Services; Future    risperiDONE (RisperDAL) 4 mg tablet; Take 1 tablet (4 mg total) by mouth daily at bedtime

## 2025-01-27 NOTE — DISCHARGE INSTR - OTHER ORDERS
CRISIS INFORMATION  If you are experiencing a mental health emergency, you may call the [facility] Crisis [facility]vention Office 24 hours a day, 7 days per week at [phone].    In [facility], call [phone].    Warmline is a confidential 24/7 telephone support service manned by trained mental health consumers.  [facility] provides support, a listening ear and can provide information about available services.[facility] specializes in the concerns of mental health consumers, their families and friends.  However, we are also here for anyone who has a mental health concern, is confused about or just doesn't know anything about mental health or where to get information.  To reach [facility], call [phone].    HOW TO GET SUBSTANCE ABUSE HELP:  If you or someone you know has a drug or alcohol problem, there is help:  [facility] Abuse [facility]: [phone]  [facility] Drug & Alcohol Abuse Services: [phone]  An assessment is the first step.   In addition to those listed there are other programs available in the area but assessment is best to determine an appropriate level of care.  If you DO NOT have Medical Assistance (MA) or Private Insurance, an assessment can be scheduled at one of these providers:  [facility]  [address] [place] [address]  [phone]   [facility]  [address] [place] [address]  [phone]   [address] [place] [address]  [phone]   [facility]  [address] [place] [address]  [phone]   [facility]  [address] [place] [address]  [phone]   [facility]  [address] [place] [address]  [phone]     If you HAVE Medical Assistance, an assessment can be scheduled at one of these providers:  [facility]  [address] [place] [address]  [phone]   [facility]  [address] [place] [address]  [phone]   [facility]  [address], [place] [address]  [phone]  [address] [place] [address], [address], [place] [address] [phone]   [address] [address]  [phone]   [address] [place] [address]  [phone]   [facility]  [address] [place] [place] [address]  [phone]   [facility]  [address] [place] [address]  [phone]   [facility]  [address] [place] [address]  [phone]   [facility]  [address] [place] [address]  [phone]     If you HAVE Private Insurance, an assessment can be scheduled at one of these providers.  Please contact these Providers to determine if they are in your network plan:  [facility]  [address], [place] [address]  [phone]   [address] [address]  [phone]   [address] [place] [address]  [phone]   [facility] ([facility]  [address] [place] [address]  [phone]   [facility]  [address] [place] [address]  [phone]     From the [facility] website [place]guides[place]opioid-epidemic/#GetNaloxone    How do I get naloxone?  Family members and friends can access naloxone by:    Obtaining a prescription from their family doctor  Using the standing order issued by [facility] Trish Rodgers. A standing order is a prescription written for the general public, rather than specifically for an individual.  To use the standing order, print it and take it with you to the pharmacy or have the digital version on your phone. Download the standing order from the Department of [facility] (PDF).    If you are unable to print  it or use the digital version, the standing order is kept on file at many pharmacies. If a pharmacy does not have it on file, they may have the ability to look it up.    Naloxone prescriptions can be filled at most pharmacies. Although the medication might not be available for same-day pickup, it often can be ordered and available within a day or two.

## 2025-01-27 NOTE — BH TRANSITION RECORD
Contact Information: If you have any questions, concerns, pended studies, tests and/or procedures, or emergencies regarding your inpatient behavioral health visit. Please contact [facility] 3P [phone] and ask to speak to a , nurse or physician. A contact is available 24 hours/ 7 days a week at this number.     Summary of Procedures Performed During your Stay:  Below is a list of major procedures performed during your hospital stay and a summary of results:  - Cardiac Procedures/Studies: ECG on 1/19/2025 with normal sinus rhythm.    Pending Studies (From admission, onward)      None          Please follow up on the above pending studies with your PCP and/or referring provider.

## 2025-01-29 ENCOUNTER — RA CDI HCC (OUTPATIENT)
Dept: OTHER | Facility: HOSPITAL | Age: 25
End: 2025-01-29

## 2025-01-29 NOTE — PROGRESS NOTES
HCC coding opportunities       Chart reviewed, no opportunity found: CHART REVIEWED, NO OPPORTUNITY FOUND      This is a reminder to address (resolve/update/assess) ALL HCC (risk adjustment) codes as found on active problem list for 2025 as patient scores reset to zero DWAYNE.  Patients Insurance        Commercial Insurance: [employer]

## 2025-01-30 ENCOUNTER — TELEPHONE (OUTPATIENT)
Age: 25
End: 2025-01-30

## 2025-01-30 NOTE — TELEPHONE ENCOUNTER
Damaris the behavioral health care manager with [employer] called she wanted  to make sure the office had her contact number  [phone] extension [phone]

## 2025-02-03 ENCOUNTER — TELEPHONE (OUTPATIENT)
Dept: PSYCHIATRY | Facility: CLINIC | Age: 25
End: 2025-02-03

## 2025-02-03 NOTE — TELEPHONE ENCOUNTER
One week follow up call for New Patient appointment with Dr. Patel on 5/1/25  was made on 2/3/25. Writer informed patient of New Patient paperwork needing to be completed 5 days prior to the appointment. Writer confirmed paperwork has been sent via [employer].    Appointment was made on: 1/27/25

## 2025-02-05 ENCOUNTER — OFFICE VISIT (OUTPATIENT)
Dept: INTERNAL MEDICINE CLINIC | Facility: CLINIC | Age: 25
End: 2025-02-05
Payer: COMMERCIAL

## 2025-02-05 VITALS
RESPIRATION RATE: 14 BRPM | HEART RATE: 80 BPM | BODY MASS INDEX: 22.66 KG/M2 | SYSTOLIC BLOOD PRESSURE: 110 MMHG | DIASTOLIC BLOOD PRESSURE: 62 MMHG | OXYGEN SATURATION: 98 % | HEIGHT: 69 IN | TEMPERATURE: 97.4 F | WEIGHT: 153 LBS

## 2025-02-05 DIAGNOSIS — Z00.00 ANNUAL PHYSICAL EXAM: Primary | ICD-10-CM

## 2025-02-05 DIAGNOSIS — F41.0 GENERALIZED ANXIETY DISORDER WITH PANIC ATTACKS: ICD-10-CM

## 2025-02-05 DIAGNOSIS — F41.1 GENERALIZED ANXIETY DISORDER WITH PANIC ATTACKS: ICD-10-CM

## 2025-02-05 DIAGNOSIS — F33.3 MAJOR DEPRESSIVE DISORDER, RECURRENT, SEVERE WITH PSYCHOTIC FEATURES (HCC): Chronic | ICD-10-CM

## 2025-02-05 PROCEDURE — 99213 OFFICE O/P EST LOW 20 MIN: CPT | Performed by: NURSE PRACTITIONER

## 2025-02-05 PROCEDURE — 99395 PREV VISIT EST AGE 18-39: CPT | Performed by: NURSE PRACTITIONER

## 2025-02-05 NOTE — ASSESSMENT & PLAN NOTE
On risperdal. He would like to get off the medication.  Recommend follow up with psychiatry as scheduled to discuss medications.

## 2025-02-05 NOTE — PROGRESS NOTES
"Name: Jah Morejon Jr.      : 2000      MRN: [id number]  Encounter Provider: ALPHONSO Nesbitt  Encounter Date: 2025   Encounter department: [facility] INTERNAL MEDICINE  :  Assessment & Plan           History of Present Illness {?Quick Links Encounters * My Last Note * Last Note in Specialty * Snapshot * Since Last Visit * History :10606}  Jah is here for a hospital follow up.  He was hospitalized from - with suicidal thoughts.   He was started on Risperdal and clonazepam for possible mood disorder and anxiety.   His condition improved and he was discharged home.   Religiously preoccupied.       Review of Systems    Objective {?Quick Links Trend Vitals * Enter New Vitals * Results Review * Timeline (Adult) * Labs * Imaging * Cardiology * Procedures * Lung Cancer Screening * Surgical eConsent :91077}  /62 (BP Location: Left arm, Patient Position: Sitting, Cuff Size: Standard)   Pulse 80   Temp (!) 97.4 °F (36.3 °C)   Resp 14   Ht 5' 9\" (1.753 m)   Wt 69.4 kg (153 lb)   SpO2 98%   BMI 22.59 kg/m²      Physical Exam  {Administrative / Billing Section (Optional):56260}  "

## 2025-02-05 NOTE — PROGRESS NOTES
Adult Annual Physical  Name: Jah Morejon Jr.      : 2000      MRN: [id number]  Encounter Provider: ALPHONSO Nesbitt  Encounter Date: 2025   Encounter department: [facility] INTERNAL MEDICINE    Assessment & Plan  Annual physical exam  Drivers license permit paperwork completed today.        Major depressive disorder, recurrent, severe with psychotic features (HCC)  On risperdal. He would like to get off the medication.  Recommend follow up with psychiatry as scheduled to discuss medications.          Generalized anxiety disorder with panic attacks  On clonazepam. Denies any anxiety or panic attacks.   He would like to get off all medications.   Continue follow up with psychiatry to further discuss.        Immunizations and preventive care screenings were discussed with patient today. Appropriate education was printed on patient's after visit summary.    Counseling:  Exercise: the importance of regular exercise/physical activity was discussed. Recommend exercise 3-5 times per week for at least 30 minutes.          History of Present Illness     Adult Annual Physical:  Patient presents for annual physical. He was hospitalized from - with suicidal thoughts.   He was started on Risperdal and clonazepam for possible mood disorder and anxiety.   His condition improved and he was discharged home.     Plans to start working full time.   He feels fine. He wants to get off the medication.   Sleeping well.   He denies any depression or anxiety. Moods have been stable.   .     Diet and Physical Activity:  - Diet/Nutrition: well balanced diet.  - Exercise: no formal exercise.    Depression Screening:    - PHQ-9 Score: 3    General Health:  - Sleep: sleeps well.  - Hearing: normal hearing right ear and normal hearing left ear.  - Vision: no vision problems.  - Dental: regular dental visits.    Review of Systems   Constitutional:  Negative for activity change, appetite change, fatigue and  "unexpected weight change.   Eyes:  Negative for visual disturbance.   Respiratory:  Negative for cough and shortness of breath.    Cardiovascular:  Negative for chest pain, palpitations and leg swelling.   Gastrointestinal:  Negative for abdominal pain, blood in stool, constipation and diarrhea.   Genitourinary:  Negative for difficulty urinating.   Musculoskeletal:  Negative for arthralgias.   Neurological:  Negative for dizziness, light-headedness and headaches.   Psychiatric/Behavioral:  Negative for agitation, behavioral problems, dysphoric mood, sleep disturbance and suicidal ideas. The patient is not nervous/anxious.          Objective   /62 (BP Location: Left arm, Patient Position: Sitting, Cuff Size: Standard)   Pulse 80   Temp (!) 97.4 °F (36.3 °C)   Resp 14   Ht 5' 9\" (1.753 m)   Wt 69.4 kg (153 lb)   SpO2 98%   BMI 22.59 kg/m²     Physical Exam  Vitals reviewed.   Constitutional:       Appearance: Normal appearance.   HENT:      Head: Normocephalic and atraumatic.   Eyes:      Conjunctiva/sclera: Conjunctivae normal.   Cardiovascular:      Rate and Rhythm: Normal rate and regular rhythm.      Heart sounds: Normal heart sounds.   Pulmonary:      Effort: Pulmonary effort is normal.      Breath sounds: Normal breath sounds.   Abdominal:      General: Bowel sounds are normal.      Palpations: Abdomen is soft.   Musculoskeletal:         General: Normal range of motion.   Skin:     General: Skin is warm and dry.   Neurological:      General: No focal deficit present.      Mental Status: He is alert and oriented to person, place, and time.   Psychiatric:         Mood and Affect: Mood normal.         Behavior: Behavior normal.       "

## 2025-02-05 NOTE — ASSESSMENT & PLAN NOTE
On clonazepam. Denies any anxiety or panic attacks.   He would like to get off all medications.   Continue follow up with psychiatry to further discuss.

## 2025-02-10 ENCOUNTER — TELEPHONE (OUTPATIENT)
Age: 25
End: 2025-02-10

## 2025-02-10 NOTE — TELEPHONE ENCOUNTER
From our records, it looks like he has gained about 8-10 lbs since 2023.   It could be the medication as they can increase or decrease appetite. Continue to monitor weight. If he is continuing to gain weight quickly. Recommend reaching out to psychiatry.

## 2025-02-10 NOTE — TELEPHONE ENCOUNTER
I spoke with Jah Jackson And asked him to call the psychiatrist to inquire about meds and weight gain.  He said he would adali them.

## 2025-02-10 NOTE — TELEPHONE ENCOUNTER
Pts father called with concerns he and patient forgot to mention at recent appt.  Pt has gained about 30 lbs since being out of the hospital and he is wondering if this could be anyone of the medications he is taking ?  He said that his son just is constantly eating.    Pts dad is asking for a call    Please advise

## 2025-02-14 ENCOUNTER — TELEPHONE (OUTPATIENT)
Age: 25
End: 2025-02-14

## 2025-02-14 NOTE — TELEPHONE ENCOUNTER
I spoke with Jah Jackson And gave instructions to use Robitussin or Mucinex for the cough and Flonase for the nasal congestin and advised to stop Nyquil.

## 2025-02-14 NOTE — TELEPHONE ENCOUNTER
I would avoid if possible as it may increase the effectiveness of the medications causing excessive drowsiness. He can take robitussin  or mucinex for cough and flonase for nasal congestion.

## 2025-02-14 NOTE — TELEPHONE ENCOUNTER
Patients father calls in to emphasize the importance of a response asap in regards to taking Nyquil while also taking Klonopin & Risperdal.  Father states patient has a bad cold right now and wants to get rest .      Please call back with doctors response asap.

## 2025-02-14 NOTE — TELEPHONE ENCOUNTER
Patient is calling asking if he can take Nyquil with his regular medication (klonopin, Risperdal).  If not, any other recommendations as he has a cold.     Thank you.

## 2025-03-04 ENCOUNTER — TELEPHONE (OUTPATIENT)
Dept: INTERNAL MEDICINE CLINIC | Facility: CLINIC | Age: 25
End: 2025-03-04

## 2025-06-14 ENCOUNTER — HOSPITAL ENCOUNTER (INPATIENT)
Facility: HOSPITAL | Age: 25
LOS: 12 days | Discharge: HOME | DRG: 885 | End: 2025-06-26
Attending: HOSPITALIST | Admitting: PSYCHIATRY & NEUROLOGY
Payer: COMMERCIAL

## 2025-06-14 ENCOUNTER — HOSPITAL ENCOUNTER (EMERGENCY)
Facility: HOSPITAL | Age: 25
End: 2025-06-14
Attending: EMERGENCY MEDICINE | Admitting: EMERGENCY MEDICINE
Payer: COMMERCIAL

## 2025-06-14 VITALS
DIASTOLIC BLOOD PRESSURE: 82 MMHG | HEART RATE: 96 BPM | TEMPERATURE: 97.8 F | RESPIRATION RATE: 18 BRPM | SYSTOLIC BLOOD PRESSURE: 144 MMHG | OXYGEN SATURATION: 96 %

## 2025-06-14 DIAGNOSIS — F29 PSYCHOSIS (HCC): Primary | ICD-10-CM

## 2025-06-14 DIAGNOSIS — L85.3 DRY SKIN DERMATITIS: Primary | ICD-10-CM

## 2025-06-14 DIAGNOSIS — Z00.8 ENCOUNTER FOR PSYCHOLOGICAL EVALUATION: ICD-10-CM

## 2025-06-14 DIAGNOSIS — L85.3 DRY SKIN DERMATITIS: ICD-10-CM

## 2025-06-14 DIAGNOSIS — Z86.59 HISTORY OF DEPRESSION: ICD-10-CM

## 2025-06-14 PROCEDURE — 99285 EMERGENCY DEPT VISIT HI MDM: CPT | Performed by: EMERGENCY MEDICINE

## 2025-06-14 PROCEDURE — 99283 EMERGENCY DEPT VISIT LOW MDM: CPT

## 2025-06-14 PROCEDURE — 82075 ASSAY OF BREATH ETHANOL: CPT

## 2025-06-14 PROCEDURE — 80307 DRUG TEST PRSMV CHEM ANLYZR: CPT

## 2025-06-14 RX ORDER — LORAZEPAM 2 MG/ML
2 INJECTION INTRAMUSCULAR EVERY 6 HOURS PRN
Status: CANCELLED | OUTPATIENT
Start: 2025-06-14

## 2025-06-14 RX ORDER — ACETAMINOPHEN 325 MG/1
975 TABLET ORAL EVERY 6 HOURS PRN
Status: DISCONTINUED | OUTPATIENT
Start: 2025-06-14 | End: 2025-06-26 | Stop reason: HOSPADM

## 2025-06-14 RX ORDER — HALOPERIDOL 5 MG/ML
5 INJECTION INTRAMUSCULAR
Status: DISCONTINUED | OUTPATIENT
Start: 2025-06-14 | End: 2025-06-26 | Stop reason: HOSPADM

## 2025-06-14 RX ORDER — AMOXICILLIN 250 MG
1 CAPSULE ORAL DAILY PRN
Status: DISCONTINUED | OUTPATIENT
Start: 2025-06-14 | End: 2025-06-26 | Stop reason: HOSPADM

## 2025-06-14 RX ORDER — HALOPERIDOL 5 MG/ML
2.5 INJECTION INTRAMUSCULAR
Status: CANCELLED | OUTPATIENT
Start: 2025-06-14

## 2025-06-14 RX ORDER — HALOPERIDOL 5 MG/1
5 TABLET ORAL
Status: CANCELLED | OUTPATIENT
Start: 2025-06-14

## 2025-06-14 RX ORDER — BENZTROPINE MESYLATE 1 MG/ML
1 INJECTION, SOLUTION INTRAMUSCULAR; INTRAVENOUS
Status: DISCONTINUED | OUTPATIENT
Start: 2025-06-14 | End: 2025-06-26 | Stop reason: HOSPADM

## 2025-06-14 RX ORDER — DIPHENHYDRAMINE HYDROCHLORIDE 50 MG/ML
50 INJECTION, SOLUTION INTRAMUSCULAR; INTRAVENOUS EVERY 6 HOURS PRN
Status: DISCONTINUED | OUTPATIENT
Start: 2025-06-14 | End: 2025-06-26 | Stop reason: HOSPADM

## 2025-06-14 RX ORDER — PROPRANOLOL HYDROCHLORIDE 10 MG/1
10 TABLET ORAL EVERY 8 HOURS PRN
Status: CANCELLED | OUTPATIENT
Start: 2025-06-14

## 2025-06-14 RX ORDER — LORAZEPAM 2 MG/ML
1 INJECTION INTRAMUSCULAR
Status: DISCONTINUED | OUTPATIENT
Start: 2025-06-14 | End: 2025-06-26 | Stop reason: HOSPADM

## 2025-06-14 RX ORDER — ACETAMINOPHEN 325 MG/1
650 TABLET ORAL EVERY 4 HOURS PRN
Status: DISCONTINUED | OUTPATIENT
Start: 2025-06-14 | End: 2025-06-26 | Stop reason: HOSPADM

## 2025-06-14 RX ORDER — HYDROXYZINE HYDROCHLORIDE 25 MG/1
50 TABLET, FILM COATED ORAL
Status: CANCELLED | OUTPATIENT
Start: 2025-06-14

## 2025-06-14 RX ORDER — TRAZODONE HYDROCHLORIDE 50 MG/1
50 TABLET ORAL
Status: CANCELLED | OUTPATIENT
Start: 2025-06-14

## 2025-06-14 RX ORDER — PROPRANOLOL HYDROCHLORIDE 10 MG/1
10 TABLET ORAL EVERY 8 HOURS PRN
Status: DISCONTINUED | OUTPATIENT
Start: 2025-06-14 | End: 2025-06-26 | Stop reason: HOSPADM

## 2025-06-14 RX ORDER — BENZTROPINE MESYLATE 1 MG/1
1 TABLET ORAL
Status: DISCONTINUED | OUTPATIENT
Start: 2025-06-14 | End: 2025-06-26 | Stop reason: HOSPADM

## 2025-06-14 RX ORDER — HALOPERIDOL 5 MG/ML
5 INJECTION INTRAMUSCULAR
Status: CANCELLED | OUTPATIENT
Start: 2025-06-14

## 2025-06-14 RX ORDER — HALOPERIDOL 5 MG/1
5 TABLET ORAL
Status: DISCONTINUED | OUTPATIENT
Start: 2025-06-14 | End: 2025-06-26 | Stop reason: HOSPADM

## 2025-06-14 RX ORDER — MAGNESIUM HYDROXIDE/ALUMINUM HYDROXICE/SIMETHICONE 120; 1200; 1200 MG/30ML; MG/30ML; MG/30ML
30 SUSPENSION ORAL EVERY 4 HOURS PRN
Status: CANCELLED | OUTPATIENT
Start: 2025-06-14

## 2025-06-14 RX ORDER — HALOPERIDOL 5 MG/ML
2.5 INJECTION INTRAMUSCULAR
Status: DISCONTINUED | OUTPATIENT
Start: 2025-06-14 | End: 2025-06-26 | Stop reason: HOSPADM

## 2025-06-14 RX ORDER — DIPHENHYDRAMINE HYDROCHLORIDE 50 MG/ML
50 INJECTION, SOLUTION INTRAMUSCULAR; INTRAVENOUS EVERY 6 HOURS PRN
Status: CANCELLED | OUTPATIENT
Start: 2025-06-14

## 2025-06-14 RX ORDER — LORAZEPAM 2 MG/ML
2 INJECTION INTRAMUSCULAR
Status: DISCONTINUED | OUTPATIENT
Start: 2025-06-14 | End: 2025-06-26 | Stop reason: HOSPADM

## 2025-06-14 RX ORDER — POLYETHYLENE GLYCOL 3350 17 G/17G
17 POWDER, FOR SOLUTION ORAL DAILY PRN
Status: DISCONTINUED | OUTPATIENT
Start: 2025-06-14 | End: 2025-06-26 | Stop reason: HOSPADM

## 2025-06-14 RX ORDER — BISACODYL 10 MG
10 SUPPOSITORY, RECTAL RECTAL DAILY PRN
Status: CANCELLED | OUTPATIENT
Start: 2025-06-14

## 2025-06-14 RX ORDER — LORAZEPAM 2 MG/ML
2 INJECTION INTRAMUSCULAR EVERY 6 HOURS PRN
Status: DISCONTINUED | OUTPATIENT
Start: 2025-06-14 | End: 2025-06-26 | Stop reason: HOSPADM

## 2025-06-14 RX ORDER — BENZTROPINE MESYLATE 1 MG/ML
1 INJECTION, SOLUTION INTRAMUSCULAR; INTRAVENOUS
Status: CANCELLED | OUTPATIENT
Start: 2025-06-14

## 2025-06-14 RX ORDER — ACETAMINOPHEN 325 MG/1
650 TABLET ORAL EVERY 6 HOURS PRN
Status: DISCONTINUED | OUTPATIENT
Start: 2025-06-14 | End: 2025-06-26 | Stop reason: HOSPADM

## 2025-06-14 RX ORDER — HYDROXYZINE HYDROCHLORIDE 25 MG/1
25 TABLET, FILM COATED ORAL
Status: CANCELLED | OUTPATIENT
Start: 2025-06-14

## 2025-06-14 RX ORDER — LORAZEPAM 2 MG/ML
1 INJECTION INTRAMUSCULAR
Status: CANCELLED | OUTPATIENT
Start: 2025-06-14

## 2025-06-14 RX ORDER — POLYETHYLENE GLYCOL 3350 17 G/17G
17 POWDER, FOR SOLUTION ORAL DAILY PRN
Status: CANCELLED | OUTPATIENT
Start: 2025-06-14

## 2025-06-14 RX ORDER — BENZTROPINE MESYLATE 1 MG/ML
0.5 INJECTION, SOLUTION INTRAMUSCULAR; INTRAVENOUS
Status: DISCONTINUED | OUTPATIENT
Start: 2025-06-14 | End: 2025-06-26 | Stop reason: HOSPADM

## 2025-06-14 RX ORDER — MAGNESIUM HYDROXIDE/ALUMINUM HYDROXICE/SIMETHICONE 120; 1200; 1200 MG/30ML; MG/30ML; MG/30ML
30 SUSPENSION ORAL EVERY 4 HOURS PRN
Status: DISCONTINUED | OUTPATIENT
Start: 2025-06-14 | End: 2025-06-26 | Stop reason: HOSPADM

## 2025-06-14 RX ORDER — HYDROXYZINE HYDROCHLORIDE 50 MG/1
50 TABLET, FILM COATED ORAL
Status: DISCONTINUED | OUTPATIENT
Start: 2025-06-14 | End: 2025-06-26 | Stop reason: HOSPADM

## 2025-06-14 RX ORDER — BENZTROPINE MESYLATE 1 MG/ML
0.5 INJECTION, SOLUTION INTRAMUSCULAR; INTRAVENOUS
Status: CANCELLED | OUTPATIENT
Start: 2025-06-14

## 2025-06-14 RX ORDER — AMOXICILLIN 250 MG
1 CAPSULE ORAL DAILY PRN
Status: CANCELLED | OUTPATIENT
Start: 2025-06-14

## 2025-06-14 RX ORDER — ACETAMINOPHEN 325 MG/1
650 TABLET ORAL EVERY 4 HOURS PRN
Status: CANCELLED | OUTPATIENT
Start: 2025-06-14

## 2025-06-14 RX ORDER — HALOPERIDOL 1 MG/1
1 TABLET ORAL EVERY 6 HOURS PRN
Status: CANCELLED | OUTPATIENT
Start: 2025-06-14

## 2025-06-14 RX ORDER — BENZTROPINE MESYLATE 1 MG/1
1 TABLET ORAL
Status: CANCELLED | OUTPATIENT
Start: 2025-06-14

## 2025-06-14 RX ORDER — HALOPERIDOL 0.5 MG/1
1 TABLET ORAL EVERY 6 HOURS PRN
Status: DISCONTINUED | OUTPATIENT
Start: 2025-06-14 | End: 2025-06-26 | Stop reason: HOSPADM

## 2025-06-14 RX ORDER — ACETAMINOPHEN 325 MG/1
650 TABLET ORAL EVERY 6 HOURS PRN
Status: CANCELLED | OUTPATIENT
Start: 2025-06-14

## 2025-06-14 RX ORDER — HYDROXYZINE HYDROCHLORIDE 25 MG/1
100 TABLET, FILM COATED ORAL
Status: CANCELLED | OUTPATIENT
Start: 2025-06-14

## 2025-06-14 RX ORDER — HYDROXYZINE HYDROCHLORIDE 50 MG/1
100 TABLET, FILM COATED ORAL
Status: DISCONTINUED | OUTPATIENT
Start: 2025-06-14 | End: 2025-06-26 | Stop reason: HOSPADM

## 2025-06-14 RX ORDER — HYDROXYZINE HYDROCHLORIDE 25 MG/1
25 TABLET, FILM COATED ORAL
Status: DISCONTINUED | OUTPATIENT
Start: 2025-06-14 | End: 2025-06-26 | Stop reason: HOSPADM

## 2025-06-14 RX ORDER — TRAZODONE HYDROCHLORIDE 50 MG/1
50 TABLET ORAL
Status: DISCONTINUED | OUTPATIENT
Start: 2025-06-14 | End: 2025-06-26 | Stop reason: HOSPADM

## 2025-06-14 RX ORDER — ACETAMINOPHEN 325 MG/1
975 TABLET ORAL EVERY 6 HOURS PRN
Status: CANCELLED | OUTPATIENT
Start: 2025-06-14

## 2025-06-14 RX ORDER — BISACODYL 10 MG
10 SUPPOSITORY, RECTAL RECTAL DAILY PRN
Status: DISCONTINUED | OUTPATIENT
Start: 2025-06-14 | End: 2025-06-26 | Stop reason: HOSPADM

## 2025-06-14 RX ORDER — LORAZEPAM 2 MG/ML
2 INJECTION INTRAMUSCULAR
Status: CANCELLED | OUTPATIENT
Start: 2025-06-14

## 2025-06-14 NOTE — LETTER
ID # [id number]    [facility] # [id number]    [facility] Phone # [phone]    DISCHARGE SUMMARY

## 2025-06-14 NOTE — ED ATTENDING ATTESTATION
6/14/2025  I, Jeancarlos Nieto MD, saw and evaluated the patient. I have discussed the patient with the resident/non-physician practitioner and agree with the resident's/non-physician practitioner's findings, Plan of Care, and MDM as documented in the resident's/non-physician practitioner's note, except where noted. All available labs and Radiology studies were reviewed.  I was present for key portions of any procedure(s) performed by the resident/non-physician practitioner and I was immediately available to provide assistance.       At this point I agree with the current assessment done in the Emergency Department.  I have conducted an independent evaluation of this patient a history and physical is as follows:  H/o major depression with psychotic features  lives with dad    Had an admit in jan 2025  had been on medications    made some passive comments to parents about wanting to be with god   Patient is unwilling to provide history he is here with his parents with mom is at bedside  Mom indicates this is the same thing that happened earlier in January  ED Course         Critical Care Time  Procedures

## 2025-06-14 NOTE — ED NOTES
"Pt is a 24 y.o. male who was brought to the ED with   Chief Complaint   Patient presents with    Psychiatric Evaluation     Pt was diagnosed with depression and psychotic tendencies and was supposed to be taking medications, stopped taking meds in February and over the past few months has been declining back into psychosis. Pt was making comments about \"going to be with god\" and \"god telling me to come to him.\" Pt unwilling to answer triage questions, information received from parents.    Patient brought to the ED via his parents with complaints of A/H , Patient reports \"The voices are mocking him\" they are making fun of me\" patient reporst that worsening A/H for the past week,Patient reports that the statement about going to be with god are his intrusive thoughts that he has been having for a while. patient reports that he recently quit his job due to his intrusive thoughts and depression Per Patient parents patient has been decompinsating since patient stop taking his medication. Patient reports that he stop taking his medication 1 month post D/C from last inpatient admission. Patient presents with flat affect, pateint is responding to internal stimuli, during assessment patient smiles at times, Patient denies S/I,H/I,V/H  Intake Assessment completed, Safety risk Assessment completed. CIS met with patient and discussed the process of a BHU admission, patient has agreed and signed a 201. CIS discussd this case and patient plan with ED Physician who is in agreement with this plan. CIS will start bed search and complete the Pre-Cert         Mamadou Enciso  Crisis Intervention Specialist II      "

## 2025-06-14 NOTE — ED PROVIDER NOTES
"Time reflects when diagnosis was documented in both MDM as applicable and the Disposition within this note       Time User Action Codes Description Comment    6/14/2025  6:38 PM Jasmina Douglas Add [L85.3] Dry skin dermatitis     6/14/2025  7:01 PM Monique Sargent Add [Z00.8] Encounter for psychological evaluation     6/14/2025  7:01 PM Monique Sargent Add [Z86.59] History of depression           ED Disposition       ED Disposition   Transfer to Behavioral Health    Condition   --    Date/Time   Sat Jun 14, 2025  6:16 PM    Comment   Jah Morejon Jr. should be transferred out to behavioral health and has been medically cleared.               Assessment & Plan       Medical Decision Making  Amount and/or Complexity of Data Reviewed  Labs: ordered. Decision-making details documented in ED Course.    Risk  Decision regarding hospitalization.      Patient is a 24 y.o. male with PMH of major depressive disorder with psychotic features, anxiety who presents to the ED for psychiatric evaluation.    Vital signs stable, afebrile. On exam benign.    Plan: POC alcohol breath test, UDS, crisis consult.     Disposition: patient medically stable for inpatient behavioral health.     Portions of the record may have been created with voice recognition software. Occasional wrong word or \"sound a like\" substitutions may have occurred due to the inherent limitations of voice recognition software. Read the chart carefully and recognize, using context, where substitutions have occurred.    ED Course as of 06/14/25 2149   Sat Jun 14, 2025   1633 SC message sent to crisis    1718 EXTBreath Alcohol: 0.000   1900 Pt signed 201. Has been accepted to [facility]       Medications - No data to display    ED Risk Strat Scores                    No data recorded                            History of Present Illness       Chief Complaint   Patient presents with    Psychiatric Evaluation     Pt was diagnosed with depression and psychotic " "tendencies and was supposed to be taking medications, stopped taking meds in February and over the past few months has been declining back into psychosis. Pt was making comments about \"going to be with god\" and \"god telling me to come to him.\" Pt unwilling to answer triage questions, information received from parents.        Past Medical History[1]   Past Surgical History[2]   Family History[3]   Social History[4]   E-Cigarette/Vaping    E-Cigarette Use Never User       E-Cigarette/Vaping Substances    Nicotine No     THC No     CBD No     Flavoring No     Other No     Unknown No       I have reviewed and agree with the history as documented.     HPI  Patient is a 24 y.o. male with PMHx major depressive disorder with psychotic features, anxiety who presents to the ED for psychiatric evaluation. Patient unable to provide any history.  Patient is accompanied by mom who is able to assist in history. She states patient had an inpatient behavioral health admission back in January and was diagnosed with depression with psychotic features, was started on medications and had been doing well up until he stopped taking medication in February.  Mom states patient stopped due to not liking taking medications.  States his mental health has been declining ever since. She states patient quit his job \"for no reason\" a few days ago. Patient told his dad he's \"going to be with god.\" He then told his mom \"I love you, I will see you in [facility].\"     Review of Systems   Psychiatric/Behavioral:  Positive for dysphoric mood.            Objective       ED Triage Vitals [06/14/25 1548]   Temperature Pulse Blood Pressure Respirations SpO2 Patient Position - Orthostatic VS   97.8 °F (36.6 °C) 96 144/82 18 96 % --      Temp Source Heart Rate Source BP Location FiO2 (%) Pain Score    Temporal -- -- -- --      Vitals      Date and Time Temp Pulse SpO2 Resp BP Pain Score FACES Pain Rating User   06/14/25 1548 97.8 °F (36.6 °C) 96 96 % 18 144/82 -- " -- KIY            Physical Exam  Vitals and nursing note reviewed.   Constitutional:       General: He is not in acute distress.     Appearance: Normal appearance. He is well-developed. He is not ill-appearing, toxic-appearing or diaphoretic.   HENT:      Head: Normocephalic and atraumatic.     Eyes:      Conjunctiva/sclera: Conjunctivae normal.       Cardiovascular:      Rate and Rhythm: Normal rate and regular rhythm.      Heart sounds: No murmur heard.  Pulmonary:      Effort: Pulmonary effort is normal. No respiratory distress.      Breath sounds: Normal breath sounds.   Abdominal:      Palpations: Abdomen is soft.      Tenderness: There is no abdominal tenderness.     Musculoskeletal:         General: No swelling.      Cervical back: Neck supple.     Skin:     General: Skin is warm and dry.      Capillary Refill: Capillary refill takes less than 2 seconds.     Neurological:      General: No focal deficit present.      Mental Status: He is alert and oriented to person, place, and time.     Psychiatric:         Mood and Affect: Affect is blunt.         Speech: He is noncommunicative.         Behavior: Behavior is withdrawn.         Results Reviewed       Procedure Component Value Units Date/Time    Rapid drug screen, urine [[id number]]  (Normal) Collected: 06/14/25 1718    Lab Status: Final result Specimen: Urine, Other Updated: 06/14/25 1754     Amph/Meth UR Negative     Barbiturate Ur Negative     Benzodiazepine Urine Negative     Cocaine Urine Negative     Methadone Urine Negative     Opiate Urine Negative     PCP Ur Negative     THC Urine Negative     Oxycodone Urine Negative     Fentanyl Urine Negative     HYDROCODONE URINE Negative    Narrative:      FOR MEDICAL PURPOSES ONLY.   IF CONFIRMATION NEEDED PLEASE CONTACT THE LAB WITHIN 5 DAYS.    Drug Screen Cutoff Levels:  AMPHETAMINE/METHAMPHETAMINES  1000 ng/mL  BARBITURATES     200 ng/mL  BENZODIAZEPINES     200 ng/mL  COCAINE      300  ng/mL  METHADONE      300 ng/mL  OPIATES      300 ng/mL  PHENCYCLIDINE     25 ng/mL  THC       50 ng/mL  OXYCODONE      100 ng/mL  FENTANYL      5 ng/mL  HYDROCODONE     300 ng/mL    POCT alcohol breath test [[id number]]  (Normal) Collected: 06/14/25 1715    Lab Status: Final result Updated: 06/14/25 1715     EXTBreath Alcohol 0.000            No orders to display       Procedures    ED Medication and Procedure Management   Prior to Admission Medications   Prescriptions Last Dose Informant Patient Reported? Taking?   clonazePAM (KlonoPIN) 0.5 mg tablet   No No   Sig: Take 0.5 tablets (0.25 mg total) by mouth 2 (two) times a day   cyanocobalamin (VITAMIN B-12) 1000 MCG tablet   No No   Sig: Take 1 tablet (1,000 mcg total) by mouth daily   ergocalciferol (VITAMIN D2) 50,000 units   No No   Sig: Take 1 capsule (50,000 Units total) by mouth once a week for 7 doses   folic acid (FOLVITE) 1 mg tablet   No No   Sig: Take 1 tablet (1 mg total) by mouth daily   melatonin 3 mg   No No   Sig: Take 1 tablet (3 mg total) by mouth daily at bedtime   risperiDONE (RisperDAL) 4 mg tablet   No No   Sig: Take 1 tablet (4 mg total) by mouth daily at bedtime      Facility-Administered Medications: None     Discharge Medication List as of 6/14/2025  9:12 PM        CONTINUE these medications which have NOT CHANGED    Details   clonazePAM (KlonoPIN) 0.5 mg tablet Take 0.5 tablets (0.25 mg total) by mouth 2 (two) times a day, Starting Mon 1/27/2025, Normal      cyanocobalamin (VITAMIN B-12) 1000 MCG tablet Take 1 tablet (1,000 mcg total) by mouth daily, Starting Tue 1/28/2025, Normal      ergocalciferol (VITAMIN D2) 50,000 units Take 1 capsule (50,000 Units total) by mouth once a week for 7 doses, Starting Tue 1/28/2025, Until Wed 3/12/2025, Normal      folic acid (FOLVITE) 1 mg tablet Take 1 tablet (1 mg total) by mouth daily, Starting Tue 1/28/2025, Normal      melatonin 3 mg Take 1 tablet (3 mg total) by mouth daily at bedtime, Starting  Mon 1/27/2025, Normal      risperiDONE (RisperDAL) 4 mg tablet Take 1 tablet (4 mg total) by mouth daily at bedtime, Starting Mon 1/27/2025, Normal           No discharge procedures on file.  ED SEPSIS DOCUMENTATION   Time reflects when diagnosis was documented in both MDM as applicable and the Disposition within this note       Time User Action Codes Description Comment    6/14/2025  6:38 PM Jasmina Douglas Add [L85.3] Dry skin dermatitis     6/14/2025  7:01 PM Monique Sargent Add [Z00.8] Encounter for psychological evaluation     6/14/2025  7:01 PM Monique Sargent Add [Z86.59] History of depression                      [1]   Past Medical History:  Diagnosis Date    Concussion 03/24/2015    Depression 02/23/2017    Fatigue 02/23/2017    GERD (gastroesophageal reflux disease)     as a child    Gynecomastia 11/10/2017    Hiatal hernia     as a child   [2] No past surgical history on file.  [3]   Family History  Problem Relation Name Age of Onset    Hypertension Father      Bipolar disorder Mother     [4]   Social History  Tobacco Use    Smoking status: Never    Smokeless tobacco: Never    Tobacco comments:     mother smoked outside of home   Vaping Use    Vaping status: Never Used   Substance Use Topics    Alcohol use: Never    Drug use: Never        Monique Sargent MD  06/14/25 7477

## 2025-06-14 NOTE — ED NOTES
Pt unwilling to answer most questions during triage, pt went from smiling/laughing inappropriately to not speaking when asked questions. Parents stated that he may be more willing to answer questions in they are not present and stepped out for the remainder of the triage. When asked if pt would prefer to be alone when speaking with the doctor, he did not answer. Pt informed he may ask to have parents present or to be alone, whichever he prefers. Pt declined to answer and walked into waiting room to sit with parents.      Carmelina Lyon RN  06/14/25 1600

## 2025-06-14 NOTE — ED NOTES
CIS started bed search, [facility] sent 201 to [facility]  for review and possible placement.    Mamadou Enciso  Crisis Intervention Specialist II

## 2025-06-14 NOTE — ED NOTES
Patient is accepted at [facility]  Patient is accepted by Dr. YARIEL Douglas per  Wilner (Intake).     Transportation is arranged with [employer].    Transportation is scheduled for 06/14/25 @2015 [facility]  Patient may go to the floor at 2015        Nurse report is to be called to [phone] prior to patient transfer.    Mamadou Enciso  Crisis Intervention Specialist II

## 2025-06-14 NOTE — ED NOTES
Patient has [facility] 361 has his primary insurance.    Mamadou Enciso  Crisis Intervention Specialist II

## 2025-06-15 PROBLEM — F29 PSYCHOSIS (HCC): Status: ACTIVE | Noted: 2025-06-15

## 2025-06-15 PROCEDURE — 99223 1ST HOSP IP/OBS HIGH 75: CPT

## 2025-06-15 RX ORDER — RISPERIDONE 2 MG/1
2 TABLET, ORALLY DISINTEGRATING ORAL
Status: DISCONTINUED | OUTPATIENT
Start: 2025-06-15 | End: 2025-06-16

## 2025-06-15 RX ORDER — RISPERIDONE 2 MG/1
2 TABLET, ORALLY DISINTEGRATING ORAL 2 TIMES DAILY
Status: DISCONTINUED | OUTPATIENT
Start: 2025-06-15 | End: 2025-06-15

## 2025-06-15 RX ADMIN — BENZTROPINE MESYLATE 1 MG: 1 INJECTION INTRAMUSCULAR; INTRAVENOUS at 13:20

## 2025-06-15 RX ADMIN — HALOPERIDOL LACTATE 5 MG: 5 INJECTION, SOLUTION INTRAMUSCULAR at 13:20

## 2025-06-15 RX ADMIN — LORAZEPAM 2 MG: 2 INJECTION INTRAMUSCULAR; INTRAVENOUS at 13:20

## 2025-06-15 NOTE — TREATMENT TEAM
"   06/15/25 1320   [place]alado Anxiety Scale   Anxious Mood 4   Tension 3   Fears 0   Insomnia 3   Intellectual 3   Depressed Mood 0   Somatic Complaints: Muscular 3   Somatic Complaints: Sensory 2   Cardiovascular Symptoms 2   Respiratory Symptoms 2   Gastrointestinal Symptoms 0   Genitourinary Symptoms 0   Autonomic Symptoms 3   Behavior at Interview 3   Regalado Anxiety Score 28   Broset Violence Checklist   Assessment type Shift   Irritability 1   Confusion 1   Boisterousness 1   Threatening physical violence 0   Verbal threats 0   Violence 1   Broset score 4     Patient irritable, yelling out, raising hands at patients and staff, swatting at staff, yelling curse words \"shut up bitch\". Administered 5mg haldol, 2mg ativan and 1mg cogentin IM will monitor for effectiveness.   "

## 2025-06-15 NOTE — NURSING NOTE
"Pt displayed  increased aggression towards staff. Yelling \"shut up,\" when staff approached pt. Pt was observed posturing towards staff, raising his hands in clenched fist. Observed yelling \"all mighty\" in the lim. Pt was redirected to his room. Will continue to monitor. Safety checks continued.   "

## 2025-06-15 NOTE — H&P
Eber Morejon#  :2000 LESA  MRN:[id number]    CSN:[id number]  Adm Date: 2025  10:04 PM   ATT PHY: Siobhan Beltran Md  [facility]         Chief Complaint: suicidal ideation, hallucinations      History of Presenting Illness: Jah Morejon Jr. is a(n) 24 y.o. year old male who is admitted to Counts include 234 beds at the [facility] on 201 commitment basis.  Patient originally presented to [facility] on  due to hallucinations telling him to kill himself.     Patient examined at bedside.  Appears selectively mute.  Patient denies chest pain, shortness of breath, dizziness, abdominal pain, constipation.     Patient refused labs and vitals this morning.    Allergies[1]    Medications Ordered Prior to Encounter[2]    Active Ambulatory Problems     Diagnosis Date Noted    Episodic tension-type headache, not intractable 2021    Neck pain 12/15/2021    Throat pain 12/15/2021    Dry skin dermatitis 2019    Major depressive disorder, recurrent, severe with psychotic features (HCC) 2025    Generalized anxiety disorder with panic attacks 2025     Resolved Ambulatory Problems     Diagnosis Date Noted    Medical clearance for psychiatric admission 2025     Past Medical History:   Diagnosis Date    Concussion 2015    Depression 2017    Fatigue 2017    GERD (gastroesophageal reflux disease)     Gynecomastia 11/10/2017    Hiatal hernia        Past Surgical History[3]    Social History: Social History[4]    Family History: Family History[5]    Review of Systems   Constitutional:  Negative for chills and fever.   HENT: Negative.     Respiratory:  Negative for shortness of breath.    Cardiovascular:  Negative for chest pain.   Gastrointestinal:  Negative for abdominal pain, constipation and diarrhea.   Genitourinary:  Negative for difficulty urinating.   Musculoskeletal:  Negative for arthralgias.   Skin: Negative.  "   Neurological:  Negative for dizziness and headaches.       Physical Exam   Vitals: Blood pressure 146/75, pulse (!) 114, temperature 97.8 °F (36.6 °C), temperature source Temporal, resp. rate (!) 8, height 5' 9\" (1.753 m), weight 67.1 kg (148 lb).,Body mass index is 21.86 kg/m².  Constitutional: Awake, alert, in no acute distress.  Head: Normocephalic and atraumatic.   Mouth/Throat: Oropharynx is clear and moist.    Eyes: Conjunctivae and EOM are normal.   Neck: Neck supple. No thyromegaly present.   Cardiovascular: Normal rate, regular rhythm and normal heart sounds.    Pulmonary/Chest: Effort normal and breath sounds normal.   Abdominal: Soft. Bowel sounds are normal. There is no tenderness. There is no rebound and no guarding.   Neurological: No focal deficits.   Musculoskeletal:  Nontender spine.  Skin: Skin is warm and dry. No edema.     Assessment     Jah Morejon . is a(n) 24 y.o. male with MDD.    Arthralgias/HA.  Tylenol as needed.   Insomnia.  Patient on melatonin 3 mg at bedtime.   Hx vitamin deficiencies.  Recheck levels.   MDD.  Managed by psych team.     Prognosis: Fair.    Discharge Plan: In progress.    Advanced Directives: I have discussed in detail with the patient the advanced directives. The patient  does not have an appointed POA or living will. Emergency contact is Jah hager, [phone].  When discussing cardiac and pulmonary resuscitation efforts with the patient, the patient wishes to be  full code.    I have spent more than 50 minutes gathering data, doing physical examination, and discussing the advanced directives, which was witnessed by caring staff.    The patient was discussed with Dr. Zambrano and he is in agreement with the above note.       [1]   Allergies  Allergen Reactions    Aspirin Angioedema    Nsaids Hives   [2]   No current facility-administered medications on file prior to encounter.     Current Outpatient Medications on File Prior to Encounter   Medication Sig " Dispense Refill    clonazePAM (KlonoPIN) 0.5 mg tablet Take 0.5 tablets (0.25 mg total) by mouth 2 (two) times a day 30 tablet 1    cyanocobalamin (VITAMIN B-12) 1000 MCG tablet Take 1 tablet (1,000 mcg total) by mouth daily 30 tablet 0    ergocalciferol (VITAMIN D2) 50,000 units Take 1 capsule (50,000 Units total) by mouth once a week for 7 doses 4 capsule 0    folic acid (FOLVITE) 1 mg tablet Take 1 tablet (1 mg total) by mouth daily 30 tablet 0    melatonin 3 mg Take 1 tablet (3 mg total) by mouth daily at bedtime 30 tablet 0    risperiDONE (RisperDAL) 4 mg tablet Take 1 tablet (4 mg total) by mouth daily at bedtime 30 tablet 1   [3] No past surgical history on file.  [4]   Social History  Socioeconomic History    Marital status: Single   Tobacco Use    Smoking status: Never    Smokeless tobacco: Never    Tobacco comments:     mother smoked outside of home   Vaping Use    Vaping status: Never Used   Substance and Sexual Activity    Alcohol use: Never    Drug use: Never    Sexual activity: Not Currently     Social Drivers of Health     Food Insecurity: No Food Insecurity (6/14/2025)    Nursing - Inadequate Food Risk Classification     Worried About Running Out of Food in the Last Year: Never true     Ran Out of Food in the Last Year: Never true     Ran Out of Food in the Last Year: Never true   Transportation Needs: No Transportation Needs (6/14/2025)    Nursing - Transportation Risk Classification     Lack of Transportation: No   Intimate Partner Violence: Unknown (6/14/2025)    Nursing IPS     Physically Hurt by Someone: No     Hurt or Threatened by Someone: No   Housing Stability: Unknown (6/14/2025)    Nursing: Inadequate Housing Risk Classification     Unable to Pay for Housing in the Last Year: No     Has Housing: No   [5]   Family History  Problem Relation Name Age of Onset    Hypertension Father      Bipolar disorder Mother

## 2025-06-15 NOTE — H&P
"H&P - Behavioral Health   Name: Jah Morejon Jr. 24 y.o. male I MRN: [id number]  Unit/Bed#: -01 I Date of Admission: 6/14/2025   Date of Service: 6/15/2025 I Hospital Day: 1     Assessment & Plan  Psychosis (HCC)  Started risperidone dissolvable tablets 2 mg daily at bedtime for symptoms of psychosis    At the time of assessment, patient demonstrates poor insight into his current psychotic condition.  His condition is highly unlikely to improve without the use of psychiatric medications, specifically antipsychotics.  In this context, the patient may require medications over objection as his hospital stay progresses. At this time, there is a concern that the patient is demonstrating symptomatology consistent with schizophrenia and in this context patient will likely require lifelong antipsychotic medication treatment.    Current Medications:    Current Facility-Administered Medications:     melatonin tablet 3 mg, Oral, HS    risperiDONE (RisperDAL M-TAB) disintegrating tablet 2 mg, Oral, BID    Risks/Benefits of Treatment:     Risks, benefits, and possible side effects of medications explained to patient. Patient does not verbalize understanding of benefits or risks of treatment refusal at this time and needs ongoing explanation of treatment benefits and treatment plan.    Treatment Planning:     All current active medications have been reviewed.  Continue to monitor response to treatment and assess for potential side effects of medications.  Encourage group therapy, milieu therapy and occupational therapy.  Collaboration with medical service for medical comorbidities as indicated.  Behavioral Health checks for safety monitoring.  Estimated Discharge Day:  14 days (6/29/2025)  Legal Status on Admission: Voluntary 201 commitment.    Psychiatric Evaluation    Chief Complaint: \"Intrusive thoughts,\" AND \"The voices make fun of me,\" AND \"Not right now, maybe later,\" AND \"Shut-Up Bitch\"    History of Present " "Illness     This is a comprehensive psychiatric evaluation for the patient Jah Morejon Jr., who is being admitted to [facility] on a voluntary 201 commitment basis.  Symptoms prior to admission include prominent symptoms of psychosis, with both visual and auditory hallucinations, with command auditory hallucinations telling him to harm himself.  Onset of symptoms is unclear, however appears to have progressively worsened over the past 3 months.  This appears to be in the setting of medication nonadherence.  Per chart review, Jah is a 24-year-old male, single, unemployed, living with his father in [facility].  Per chart review, Jah has a past psychiatric history of episodic tension type headache.  Per chart review, Jah has a past psychiatric history of major depressive disorder with psychotic features.    The following italicized information is copied from the ED crisis evaluation performed 6/14/2025  Patient brought to the ED via his parents with complaints of A/H , Patient reports \"The voices are mocking him\" they are making fun of me\" patient reporst that worsening A/H for the past week,Patient reports that the statement about going to be with god are his intrusive thoughts that he has been having for a while. patient reports that he recently quit his job due to his intrusive thoughts and depression Per Patient parents patient has been decompinsating since patient stop taking his medication. Patient reports that he stop taking his medication 1 month post D/C from last inpatient admission. Patient presents with flat affect, pateint is responding to internal stimuli, during assessment patient smiles at times, Patient denies S/I,H/I,V/H  Intake Assessment completed, Safety risk Assessment completed. CIS met with patient and discussed the process of a U admission, patient has agreed and signed a 201. CIS discussd this case and patient plan with ED Physician who is in agreement with this " "plan. CIS will start bed search and complete the Pre-Cert     Symptoms prior to admission include prominent symptoms of psychosis, with both visual and auditory hallucinations, with command auditory hallucinations telling him to harm himself.  Onset of symptoms is unclear, however appears to have progressively worsened over the past 3 months.  This appears to be in the setting of medication nonadherence.     At the time of interview, Jah appeared acutely psychotic.  He was uncooperative with interview.  He was not willing to answer questions regarding his past psychiatric history, current psychiatric symptoms, or the events that brought him into the hospital.  At the time of interview Jah was uncooperative in terms of behaviors, with psychomotor agitation, irritable.  He was observed responding to internal stimuli and grasping of the air.  His speech was scant.  He was heard making brief, one-word exclamation's (yelling loudly \"alimighty!\").  He was physically aggressive and swatted multiple times at this writer.    At the time of interview, Jah stated he was here because of \"intrusive thoughts.\"  He then stated \"the voices make fun of me.\"  He continued to avoid further conversation, stating \"not now, maybe later.\"  At the time of interview, he denied suicidal ideations and homicidal ideations.    Patient reports experiencing both visual and auditory hallucinations.  He states \"the voices make fun of me.\"  He states that these voices have commanded him to end his life.  He appears responding to internal stimuli and is seen grasping at the air.    This writer disengaged from the interview in this context.  Approximately 5 minutes later, Jah was observed by staff continuing to demonstrate physically aggressive behavior and was also swatting at patients on the unit.  Security was called.  Jah was given IM injection of Haldol 5 mg, 2 mg of Ativan, and 1 mg of Cogentin.    Psychiatric Review Of " "Systems:    Psychiatric review of systems was not able to be obtained.      At the time of interview, Jah appeared acutely psychotic.  He was uncooperative with interview.  He was not willing to answer questions regarding his past psychiatric history, current psychiatric symptoms, or the events that brought him into the hospital.     At the time of interview Jah was uncooperative in terms of behaviors, with psychomotor agitation, irritable.  He was observed responding to internal stimuli and grasping of the air.  His speech was scant.  He was heard making brief, one-word exclamation's (yelling loudly \"alimighty!\").  He was physically aggressive and swatted multiple times at this writer.    Patient reports experiencing both visual and auditory hallucinations.  He states \"the voices make fun of me.\"  He states that these voices have commanded him to end his life.  He appears responding to internal stimuli and is seen grasping at the air.    Historical Information     Past Psychiatric History:     Past Inpatient Psychiatric Treatment:   Per chart review, patient has had previous psychiatric admissions for behavioral health through [facility].  Per chart review, patient was most recently hospitalized at [facility] in January 2025.  Past Outpatient Psychiatric Treatment:    Unclear.  Per chart review the patient was planned to follow-up with Dr. Patel through [facility], however did not make his appointment in May 2025.  Past Suicide Attempts: Per chart review, patient has made suicidal gestures in the past.  Per chart review there have been no past suicide attempts.  Past Violent Behavior: At the time of interview today patient demonstrates violent behavior  Past Psychiatric Medication Trials: Risperidone, Klonopin    Substance Abuse History:    Tobacco, Alcohol and Drug Use History     Tobacco Use    Smoking status: Never    Smokeless tobacco: Never    Tobacco comments:     mother smoked outside of home "   Vaping Use    Vaping status: Never Used   Substance Use Topics    Alcohol use: Never    Drug use: Never      Additional Substance Use Detail       Questions Responses    Problems Due to Past Use of Alcohol? No    Problems Due to Past Use of Substances? No    Substance Use Assessment Substance use within the past 12 months    Alcohol Use Frequency Denies use in past 12 months    Cannabis frequency Never used    Comment:  Never used on 1/19/2025     Heroin Frequency Denies use in past 12 months    Cocaine frequency Never used    Comment:  Never used on 1/19/2025     Crack Cocaine Frequency Denies use in past 12 months    Methamphetamine Frequency Denies use in past 12 months    Narcotic Frequency Denies use in past 12 months    Benzodiazepine Frequency Denies use in past 12 months    Amphetamine frequency Denies use in past 12 months    Barbituate Frequency Denies use use in past 12 months    Inhalant frequency Never used    Comment:  Never used on 1/19/2025     Hallucinogen frequency Never used    Comment:  Never used on 1/19/2025     Ecstasy frequency Never used    Comment:  Never used on 1/19/2025     Other drug frequency Never used    Comment:  Never used on 1/19/2025     Opiate frequency Denies use in past 12 months    Not reviewed.           I am unable to assess the patient for substance use within the past 12 months as they are unable or unwilling to answer    UDS at the time of admission was normal    Family Psychiatric History:     Per chart review, the patient's mother has been diagnosed with bipolar disorder    Social History:    Education: Per chart review, patient completed ninth grade  Learning Disabilities: Per chart review, there is no history of learning disability  Marital History: Per chart review, patient is single  Children: Per chart review, patient has no children  Living Arrangement: Per chart review, patient resides with father in [facility]  Occupational History: Per chart  review, patient is unemployed  Functioning Relationships: Per chart review, patient has a good support system  Legal History: Per chart review, there is no known legal history   History: Per chart review, there is no known  history  Access to firearms: Per chart review, patient has not had access to guns and firearms in the past.  This topic will need to be revisited as hospital stay progresses.    Traumatic History:     Abuse: Per chart review, patient has no known history of abuse  Other Traumatic Events: None reported    Past Medical History      History of Seizures: Per chart review, there is no known history of seizures  History of Head injury with loss of consciousness: Per chart review, there is no known history of head injury    Past Medical History[1]  Past Surgical History[2]  Meds/Allergies      Allergies   Allergen Reactions    Aspirin Angioedema    Nsaids Hives        Current Facility-Administered Medications:     acetaminophen (TYLENOL) tablet 650 mg, Q6H PRN    acetaminophen (TYLENOL) tablet 650 mg, Q4H PRN    acetaminophen (TYLENOL) tablet 975 mg, Q6H PRN    aluminum-magnesium hydroxide-simethicone (MAALOX) oral suspension 30 mL, Q4H PRN    haloperidol lactate (HALDOL) injection 2.5 mg, Q4H PRN Max 4/day **AND** LORazepam (ATIVAN) injection 1 mg, Q4H PRN Max 4/day **AND** benztropine (COGENTIN) injection 0.5 mg, Q4H PRN Max 4/day    haloperidol lactate (HALDOL) injection 5 mg, Q4H PRN Max 4/day **AND** LORazepam (ATIVAN) injection 2 mg, Q4H PRN Max 4/day **AND** benztropine (COGENTIN) injection 1 mg, Q4H PRN Max 4/day    benztropine (COGENTIN) injection 1 mg, Q4H PRN Max 6/day    benztropine (COGENTIN) tablet 1 mg, Q4H PRN Max 6/day    bisacodyl (DULCOLAX) rectal suppository 10 mg, Daily PRN    hydrOXYzine HCL (ATARAX) tablet 50 mg, Q6H PRN Max 4/day **OR** diphenhydrAMINE (BENADRYL) injection 50 mg, Q6H PRN    haloperidol (HALDOL) tablet 1 mg, Q6H PRN    haloperidol (HALDOL) tablet  2.5 mg, Q4H PRN Max 4/day    haloperidol (HALDOL) tablet 5 mg, Q4H PRN Max 4/day    hydrOXYzine HCL (ATARAX) tablet 100 mg, Q6H PRN Max 4/day **OR** LORazepam (ATIVAN) injection 2 mg, Q6H PRN    hydrOXYzine HCL (ATARAX) tablet 25 mg, Q6H PRN Max 4/day    melatonin tablet 3 mg, HS    polyethylene glycol (MIRALAX) packet 17 g, Daily PRN    propranolol (INDERAL) tablet 10 mg, Q8H PRN    risperiDONE (RisperDAL M-TAB) disintegrating tablet 2 mg, HS    senna-docusate sodium (SENOKOT S) 8.6-50 mg per tablet 1 tablet, Daily PRN    traZODone (DESYREL) tablet 50 mg, HS PRN  Prior to Admission Medications   Prescriptions Last Dose Informant Patient Reported? Taking?   clonazePAM (KlonoPIN) 0.5 mg tablet More than a month  No No   Sig: Take 0.5 tablets (0.25 mg total) by mouth 2 (two) times a day   cyanocobalamin (VITAMIN B-12) 1000 MCG tablet More than a month  No No   Sig: Take 1 tablet (1,000 mcg total) by mouth daily   ergocalciferol (VITAMIN D2) 50,000 units   No No   Sig: Take 1 capsule (50,000 Units total) by mouth once a week for 7 doses   folic acid (FOLVITE) 1 mg tablet More than a month  No No   Sig: Take 1 tablet (1 mg total) by mouth daily   melatonin 3 mg More than a month  No No   Sig: Take 1 tablet (3 mg total) by mouth daily at bedtime   risperiDONE (RisperDAL) 4 mg tablet More than a month  No No   Sig: Take 1 tablet (4 mg total) by mouth daily at bedtime      Facility-Administered Medications: None     Medical History Review: I have reviewed the patient's PMH, PSH, Social History, Family History, Meds, and Allergies     Objective :  Temp:  [97.8 °F (36.6 °C)] 97.8 °F (36.6 °C)  HR:  [] 114  BP: (144-146)/(75-82) 146/75  Resp:  [8-18] 8  SpO2:  [96 %] 96 %  O2 Device: None (Room air)    Mental Status Evaluation:    Appearance:  Alert, appears stated age, bearded, dressed in hospital scrubs, disheveled, poor eye contact, appears acutely distressed   Behavior:  Psychomotor agitation, evasive, uncooperative  "  Speech:  Scant, loud   Mood:  \"The voices are making fun of me\"   Affect:  Bizarre, anxious, labile, inappropriately smiling   Language: does not answer   Thought Process:  Disorganized, illogical   Thought Content:  Paranoid ideation, Confucianism preoccupation   Perceptual Disturbances: Patient reports experiencing both visual and auditory hallucinations.  He states \"the voices make fun of me.\"  He states that these voices have commanded him to end his life.  He appears responding to internal stimuli and is seen grasping at the air.   Risk Potential: Suicidal Ideation --denies  Homicidal Ideations - angry, hostile feelings with no homicidal plan  Potential for Aggression - Yes, due to acute psychosis   Sensorium:  oriented to person, place, and situation   Memory:  recent and remote memory: unable to assess due to lack of cooperation   Consciousness:  alert and awake   Attention/Concentration: poor attention span and poor concentration   Intellect: unable to assess due to lack of cooperation   Fund of Knowledge: Unable to assess fund of knowledge due to lack of cooperation   Insight:  poor   Judgment: poor   Muscle Strength:  Muscle Tone: normal  normal   Gait/Station: normal gait/station, normal balance   Motor Activity: no abnormal movements     Patient Strengths/Assets: family ties, good past treatment response, good physical health, good support system, patient is on a voluntary commitment    Patient Barriers/Limitations: difficulty adapting, lack of financial means, noncompliant with medication, poor insight, poor self-care, uncooperative      Lab Results: I have reviewed the following results:  Results from the past 24 hours:   Recent Results (from the past 24 hours)   POCT alcohol breath test    Collection Time: 06/14/25  5:15 PM   Result Value Ref Range    EXTBreath Alcohol 0.000    Rapid drug screen, urine    Collection Time: 06/14/25  5:18 PM   Result Value Ref Range    Amph/Meth UR Negative Negative    " "Barbiturate Ur Negative Negative    Benzodiazepine Urine Negative Negative    Cocaine Urine Negative Negative    Methadone Urine Negative Negative    Opiate Urine Negative Negative    PCP Ur Negative Negative    THC Urine Negative Negative    Oxycodone Urine Negative Negative    Fentanyl Urine Negative Negative    HYDROCODONE URINE Negative Negative       Imaging Results Review: No pertinent imaging studies reviewed.  Other Study Results Review: No additional pertinent studies reviewed.    Code Status: Level 1 - Full Code  Advance Directive and Living Will: <no information>  Next of Kin: Extended Emergency Contact Information  Primary Emergency Contact: GLENDA FLOWERS SR  Home Phone: [phone]  Relation: Father  Secondary Emergency Contact: Alexia Flowers  Mobile Phone: [phone]  Relation: Mother    Administrative Statements     Counseling / Coordination of Care:   Patient's progress discussed with staff in treatment team meeting.  Medication changes reviewed with staff in treatment team meeting.    Inpatient Psychiatric Certification:  Estimated length of stay: 14 midnights   Based upon physical, mental and social evaluations, I certify that inpatient psychiatric services are medically necessary for this patient for a duration of 14 midnights for the treatment of Psychosis (HCC)  Available alternative community resources do not meet the patient's mental health care needs.  I further attest that an established written individualized plan of care has been implemented and is outlined in the patient's medical records.    Portions of the record may have been created with voice recognition software. Occasional wrong word or \"sound a like\" substitutions may have occurred due to the inherent limitations of voice recognition software. Read the chart carefully and recognize, using context, where substitutions have occurred.    William Hernández MD 06/15/25         [1]   Past Medical History:  Diagnosis Date    " Concussion 03/24/2015    Depression 02/23/2017    Fatigue 02/23/2017    GERD (gastroesophageal reflux disease)     as a child    Gynecomastia 11/10/2017    Hiatal hernia     as a child   [2] No past surgical history on file.

## 2025-06-15 NOTE — PLAN OF CARE
Problem: Alteration in Thoughts and Perception  Goal: Verbalize thoughts and feelings  Description: Interventions:  - Promote a nonjudgmental and trusting relationship with the patient through active listening and therapeutic communication  - Assess patient's level of functioning, behavior and potential for risk  - Engage patient in 1 on 1 interactions  - Encourage patient to express fears, feelings, frustrations, and discuss symptoms    - Walton patient to reality, help patient recognize reality-based thinking   - Administer medications as ordered and assess for potential side effects  - Provide the patient education related to the signs and symptoms of the illness and desired effects of prescribed medications  Outcome: Progressing  Goal: Refrain from acting on delusional thinking/internal stimuli  Description: Interventions:  - Monitor patient closely, per order   - Utilize least restrictive measures   - Set reasonable limits, give positive feedback for acceptable   - Administer medications as ordered and monitor of potential side effects  Outcome: Not Progressing

## 2025-06-15 NOTE — NURSING NOTE
"Pt was admitted to [facility] 6/14 at 2204 d/t intrusive thoughts \"mocking the pt\". Pt states that the thoughts are telling him to, \"shut up\" and that he's \"stupid.\" Pt denies any current thoughts of SI. Stated that he had thoughts of SI months ago \"cut self with a knife,\" but doesn't remember when he had the thoughts to cut himself. Pt is a poor historian. Denied any HI or VH. Pt appears to be internally preoccupied staring at the wall and laughing to self. Pt is Zoroastrianism preoccupied talking about god and \"Artie loves you.\" C-SSRS lifetime is high and daily is low. Pt states feeling safe on the uni. PTA medication list complete, MD aware. Pt's speech was delayed and soft. Eye contact was poor. Pt displayed concrete thinking. Appeared euphoric at times. Pt lives with his father. Denied any current or past abuse. States feeling safe of the unit. Denied assess to fire arms. Skin check preformed with RN. Skin color appropriate to ethnicity. Pt has and abrasion of his right thumb, and Callus on left great toe. No c/o pain or discomfort. Safety checks begin.   "

## 2025-06-15 NOTE — ASSESSMENT & PLAN NOTE
Started risperidone dissolvable tablets 2 mg daily at bedtime for symptoms of psychosis    At the time of assessment, patient demonstrates poor insight into his current psychotic condition.  His condition is highly unlikely to improve without the use of psychiatric medications, specifically antipsychotics.  In this context, the patient may require medications over objection as his hospital stay progresses. At this time, there is a concern that the patient is demonstrating symptomatology consistent with schizophrenia and in this context patient will likely require lifelong antipsychotic medication treatment.

## 2025-06-15 NOTE — EMTALA/ACUTE CARE TRANSFER
[facility] EMERGENCY DEPARTMENT  [address] [address]  Dept: [phone]      EMTALA TRANSFER CONSENT    NAME Jah Morejon Jr.                                         2000                              MRN [id number]    I have been informed of my rights regarding examination, treatment, and transfer   by Dr. Jeancarlos Nieto MD    Benefits:      Risks:        Consent for Transfer:  I acknowledge that my medical condition has been evaluated and explained to me by the emergency department physician or other qualified medical person and/or my attending physician, who has recommended that I be transferred to the service of  Accepting Physician: DR. YARIEL GARRETT at Accepting Facility Name, City & State : [facility], [place], [place]. The above potential benefits of such transfer, the potential risks associated with such transfer, and the probable risks of not being transferred have been explained to me, and I fully understand them.  The doctor has explained that, in my case, the benefits of transfer outweigh the risks.  I agree to be transferred.    I authorize the performance of emergency medical procedures and treatments upon me in both transit and upon arrival at the receiving facility.  Additionally, I authorize the release of any and all medical records to the receiving facility and request they be transported with me, if possible.  I understand that the safest mode of transportation during a medical emergency is an ambulance and that the Hospital advocates the use of this mode of transport. Risks of traveling to the receiving facility by car, including absence of medical control, life sustaining equipment, such as oxygen, and medical personnel has been explained to me and I fully understand them.    (SANJUANA CORRECT BOX BELOW)  [  ]  I consent to the stated transfer and to be transported by ambulance/helicopter.  [  ]  I consent to the stated transfer, but refuse  transportation by ambulance and accept full responsibility for my transportation by car.  I understand the risks of non-ambulance transfers and I exonerate the Hospital and its staff from any deterioration in my condition that results from this refusal.    X___________________________________________    DATE  25  TIME________  Signature of patient or legally responsible individual signing on patient behalf           RELATIONSHIP TO PATIENT_________________________          Provider Certification    NAME Jah Morejon Jr.                                         2000                              MRN [id number]    A medical screening exam was performed on the above named patient.  Based on the examination:    Condition Necessitating Transfer The primary encounter diagnosis was Dry skin dermatitis. Diagnoses of Encounter for psychological evaluation and History of depression were also pertinent to this visit.    Patient Condition:      Reason for Transfer:      Transfer Requirements: Facility [place], [place]   Space available and qualified personnel available for treatment as acknowledged by [facility] ([facility] ) [phone]  Agreed to accept transfer and to provide appropriate medical treatment as acknowledged by       DR. YARIEL GARRETT  Appropriate medical records of the examination and treatment of the patient are provided at the time of transfer   STAFF INITIAL WHEN COMPLETED _______  Transfer will be performed by qualified personnel from [facility]  and appropriate transfer equipment as required, including the use of necessary and appropriate life support measures.    Provider Certification: I have examined the patient and explained the following risks and benefits of being transferred/refusing transfer to the patient/family:         Based on these reasonable risks and benefits to the patient and/or the unborn child(alvin), and based upon the information available at the time of the patient’s  examination, I certify that the medical benefits reasonably to be expected from the provision of appropriate medical treatments at another medical facility outweigh the increasing risks, if any, to the individual’s medical condition, and in the case of labor to the unborn child, from effecting the transfer.    X____________________________________________ DATE 06/14/25        TIME_______      ORIGINAL - SEND TO MEDICAL RECORDS   COPY - SEND WITH PATIENT DURING TRANSFER

## 2025-06-15 NOTE — NURSING NOTE
"This writer attempted to obtain vitals on the patient. Pt replied with \"not right now maybe later\".    "

## 2025-06-15 NOTE — TREATMENT PLAN
TREATMENT PLAN REVIEW - Behavioral Health Jah Morejon  24 y.o. 2000 male MRN: [id number]    [facility] Room / Bed: [facility] 224-01 Encounter: [id number]          Admit Date/Time:  6/14/2025 10:04 PM    Treatment Team:   MD Lexie Hull LPN    Diagnosis: Principal Problem:    Psychosis (HCC)      Patient Strengths/Assets: Family ties, good past treatment response, good physical health, good support system, patient is on a voluntary commitment    Patient Barriers/Limitations: Difficulty adapting, lack of financial means, noncompliant with medication, poor insight, poor self-care, uncooperative    Short Term Goals: decrease in anxiety symptoms, decrease in paranoid thoughts, decrease in psychotic symptoms, ability to stay safe on the unit, ability to stay free of restraints, mood stabilization, acceptance of need for psychiatric treatment, acceptance of psychiatric medications    Long Term Goals: improvement in anxiety, stabilization of mood, free of suicidal thoughts, resolution of psychotic symptoms, improvement in reality testing, improvement in reasoning ability, acceptance of need for psychiatric medications, acceptance of need for psychiatric treatment, acceptance of need for psychiatric follow up after discharge    Progress Towards Goals: starting psychiatric medications as prescribed    Recommended Treatment: medication management, patient medication education, group therapy, milieu therapy, continued Behavioral Health psychiatric evaluation/assessment process    Treatment Frequency: daily medication monitoring, group and milieu therapy daily, monitoring through interdisciplinary rounds, monitoring through weekly patient care conferences    Expected Discharge Date:  6/29/2025    Discharge Plan: referral for outpatient medication management with a psychiatrist, referrals as indicated,  return to previous living arrangement    Treatment Plan Created/Updated By: William Hernández MD

## 2025-06-16 LAB
ATRIAL RATE: 91 BPM
P AXIS: 79 DEGREES
PR INTERVAL: 132 MS
QRS AXIS: 89 DEGREES
QRSD INTERVAL: 74 MS
QT INTERVAL: 348 MS
QTC INTERVAL: 429 MS
T WAVE AXIS: 73 DEGREES
VENTRICULAR RATE: 91 BPM

## 2025-06-16 PROCEDURE — 93010 ELECTROCARDIOGRAM REPORT: CPT | Performed by: INTERNAL MEDICINE

## 2025-06-16 PROCEDURE — NC001 PR NO CHARGE: Performed by: PSYCHIATRY & NEUROLOGY

## 2025-06-16 PROCEDURE — 93005 ELECTROCARDIOGRAM TRACING: CPT

## 2025-06-16 PROCEDURE — 99232 SBSQ HOSP IP/OBS MODERATE 35: CPT | Performed by: PSYCHIATRY & NEUROLOGY

## 2025-06-16 RX ORDER — RISPERIDONE 2 MG/1
2 TABLET, ORALLY DISINTEGRATING ORAL 2 TIMES DAILY
Status: DISCONTINUED | OUTPATIENT
Start: 2025-06-16 | End: 2025-06-17

## 2025-06-16 RX ADMIN — LORAZEPAM 2 MG: 2 INJECTION INTRAMUSCULAR; INTRAVENOUS at 21:03

## 2025-06-16 RX ADMIN — BENZTROPINE MESYLATE 1 MG: 1 INJECTION INTRAMUSCULAR; INTRAVENOUS at 21:03

## 2025-06-16 RX ADMIN — HALOPERIDOL LACTATE 5 MG: 5 INJECTION, SOLUTION INTRAMUSCULAR at 21:03

## 2025-06-16 NOTE — ED NOTES
Insurance Authorization for admission:   Authorization submitted via [employer]. Clinical updated x2   Pending Auth [id number]

## 2025-06-16 NOTE — PROGRESS NOTES
"Met with Jah completed his admission self assessment. His stressors are the voices telling him negative things. As we talked he would say \"shut up bitch\" which was completely different that his Confucianist comments. He does have things he enjoys he could not answer anything he wanted to learn. He presented as psychotic.  "

## 2025-06-16 NOTE — PLAN OF CARE
Problem: Alteration in Thoughts and Perception  Goal: Refrain from acting on delusional thinking/internal stimuli  Description: Interventions:  - Monitor patient closely, per order   - Utilize least restrictive measures   - Set reasonable limits, give positive feedback for acceptable   - Administer medications as ordered and monitor of potential side effects  6/15/2025 1935 by Jennifer Espino RN  Outcome: Not Progressing     Problem: Alteration in Thoughts and Perception  Goal: Verbalize thoughts and feelings  Description: Interventions:  - Promote a nonjudgmental and trusting relationship with the patient through active listening and therapeutic communication  - Assess patient's level of functioning, behavior and potential for risk  - Engage patient in 1 on 1 interactions  - Encourage patient to express fears, feelings, frustrations, and discuss symptoms    - [place]rill patient to reality, help patient recognize reality-based thinking   - Administer medications as ordered and assess for potential side effects  - Provide the patient education related to the signs and symptoms of the illness and desired effects of prescribed medications  Outcome: Progressing

## 2025-06-16 NOTE — PROGRESS NOTES
"Progress Note - Jah Morejon Jr. 24 y.o. male MRN: [id number]    Unit/Bed#: [facility] [place] 224-01 Encounter: [id number]        Subjective:   Patient seen and examined at bedside after reviewing the chart and discussing the case with the caring staff.      Patient examined at bedside.  Patient refusing assessment.  Yelled \"shut up\" and slammed door to room.     Patient refusing labs.     Physical Exam   Vitals: Blood pressure 128/76, pulse 89, temperature 98.5 °F (36.9 °C), temperature source Temporal, resp. rate 18, height 5' 9\" (1.753 m), weight 67.1 kg (148 lb), SpO2 97%.,Body mass index is 21.86 kg/m².  Constitutional: Patient in no acute distress.  HEENT: PERR, EOMI, MMM.  Cardiovascular: Normal rate and regular rhythm.    Pulmonary/Chest: Effort normal and breath sounds normal.   Abdomen: Soft, + BS, NT.    Assessment/Plan:  Jah Morejon Jr. is a(n) 24 y.o. male with MDD.     Arthralgias/HA.  Tylenol as needed.   Insomnia.  Patient on melatonin 3 mg at bedtime.   Hx vitamin deficiencies.  Recheck levels.     The patient was discussed with Dr. Zambrano and he is in agreement with the above note.  "

## 2025-06-16 NOTE — PLAN OF CARE
Problem: DISCHARGE PLANNING - CARE MANAGEMENT  Goal: Discharge to post-acute care or home with appropriate resources  Description: INTERVENTIONS:  - Conduct assessment to determine patient/family and health care team treatment goals, and need for post-acute services based on payer coverage, community resources, and patient preferences, and barriers to discharge  - Address psychosocial, clinical, and financial barriers to discharge as identified in assessment in conjunction with the patient/family and health care team  - Arrange appropriate level of post-acute services according to patient’s   needs and preference and payer coverage in collaboration with the physician and health care team  - Communicate with and update the patient/family, physician, and health care team regarding progress on the discharge plan  - Arrange appropriate transportation to post-acute venues  Outcome: Progressing   New goal initiated. 201

## 2025-06-16 NOTE — CONSULTS
" SECOND OPINION FOR INJECTABLE PSYCHIATRIC MEDICATIONS    Jah Morejon Jr. 24 y.o. male MRN: [id number]  Unit/Bed#: [facility] 224-01 Encounter: [id number]      Reason for Admission/Current Symptoms:    Jah Morejon Jr. is a 24 y.o. male with a history of psychosis and anxiety who was admitted initially to the inpatient psychiatric unit on a voluntary 201 commitment basis due to anxiety, unstable mood, psychotic symptoms, auditory hallucinations, and bizarre behavior. Symptoms prior to admission included poor concentration, bizarre behavior, auditory hallucinations, delusional thoughts, noncompliance with treatment, and noncompliance with medications. Staff reports patient displayed episode of increased agitation, aggressive behavior towards staff and observed yelling in the lim periodically. He has been refusing to take any medication since his admission on 6/14/25. Second opinion for injectable psychotropic medications was requested by Dr. Beltran due to Jah's refusal to take oral medications.  On evaluation today Jah appears clearly psychotic, uncooperative and not answering most of the questions. He does admit that he has had few psych past psych admissions and currently he is hearing voices \"making fun of him\". He then became selectively mute, smiling periodically, and actively responding to internal stimuli during the interview. Patient does not have understanding about his current psychotic symptoms and states he does not take any medications. He was explained about his conversion to 302 status and IM medication will be administered if he continues refusing to take oral medication. Patient does not verbalize understanding due to his acute psychotic symptoms at this time.     Mental Status Evaluation:    Appearance:  disheveled   Behavior:  guarded, uncooperative   Speech:  scant, does not want to talk   Mood:  dysphoric, anxious   Affect:  inappropriate smile, mood-incongruent   Thought Process:  " disorganized, illogical   Associations: tangential associations, blocking   Thought Content:  paranoid delusions   Perceptual Disturbances: auditory hallucinations, appears responding to internal stimuli   Risk Potential: Suicidal ideation - None at present  Homicidal ideation - None at present  Potential for aggression - Yes, due to acute psychosis   Sensorium:  oriented to person and place   Memory:  recent and remote memory: unable to assess due to lack of cooperation, grossly intact   Consciousness:  alert and awake    Attention: decreased concentration and decreased attention span   Insight:  poor   Judgment: poor   Gait/Station: normal gait/station   Motor Activity: no abnormal movements       Vital signs in last 24 hours:    Temp:  [97.6 °F (36.4 °C)-98.5 °F (36.9 °C)] 98.5 °F (36.9 °C)  HR:  [] 89  BP: (113-128)/(59-76) 128/76  Resp:  [18] 18  SpO2:  [97 %] 97 %    Labs: I have personally reviewed all pertinent laboratory/tests results.  Most Recent Labs:   Lab Results   Component Value Date    WBC 5.81 01/20/2025    RBC 4.31 01/20/2025    HGB 13.6 01/20/2025    HCT 41.2 01/20/2025     01/20/2025    RDW 10.5 (L) 01/20/2025    NEUTROABS 3.30 01/20/2025    SODIUM 142 01/20/2025    K 4.0 01/20/2025     01/20/2025    CO2 29 01/20/2025    BUN 10 01/20/2025    CREATININE 0.93 01/20/2025    GLUC 89 01/20/2025    GLUF 89 01/20/2025    CALCIUM 8.9 01/20/2025    AST 14 01/20/2025    ALT 10 01/20/2025    ALKPHOS 32 (L) 01/20/2025    TP 6.4 01/20/2025    ALB 4.1 01/20/2025    TBILI 0.60 01/20/2025    CHOLESTEROL 118 01/20/2025    HDL 44 01/20/2025    TRIG 85 01/20/2025    LDLCALC 57 01/20/2025    NONHDLC 74 01/20/2025    CQH1DPEBQOMQ 3.198 01/20/2025    HGBA1C 4.2 01/20/2025    EAG 74 01/20/2025       Medications:  All current active medications have been reviewed.    Current medications:  Current Facility-Administered Medications   Medication Dose Route Frequency Provider Last Rate    acetaminophen   650 mg Oral Q6H PRN Jasmina Douglas MD      acetaminophen  650 mg Oral Q4H PRN Jasmina Douglas MD      acetaminophen  975 mg Oral Q6H PRN Jasmina Douglas MD      aluminum-magnesium hydroxide-simethicone  30 mL Oral Q4H PRN Jasmina Douglas MD      haloperidol lactate  2.5 mg Intramuscular Q4H PRN Max 4/day Jasmina Douglas MD      And    LORazepam  1 mg Intramuscular Q4H PRN Max 4/day Jasmina Douglas MD      And    benztropine  0.5 mg Intramuscular Q4H PRN Max 4/day Jasmina Douglas MD      haloperidol lactate  5 mg Intramuscular Q4H PRN Max 4/day Jasmina Douglas MD      And    LORazepam  2 mg Intramuscular Q4H PRN Max 4/day Jasmina Douglas MD      And    benztropine  1 mg Intramuscular Q4H PRN Max 4/day Jasmina Douglas MD      benztropine  1 mg Intramuscular Q4H PRN Max 6/day Jasmina Douglas MD      benztropine  1 mg Oral Q4H PRN Max 6/day Jasmina Douglas MD      bisacodyl  10 mg Rectal Daily PRN Jasmina Douglas MD      hydrOXYzine HCL  50 mg Oral Q6H PRN Max 4/day Jasmina Douglas MD      Or    diphenhydrAMINE  50 mg Intramuscular Q6H PRN Jasmina Douglas MD      haloperidol  1 mg Oral Q6H PRN Jasmina Douglas MD      haloperidol  2.5 mg Oral Q4H PRN Max 4/day Jasmina Douglas MD      haloperidol  5 mg Oral Q4H PRN Max 4/day Jasmina Douglas MD      hydrOXYzine HCL  100 mg Oral Q6H PRN Max 4/day Jasmina Douglas MD      Or    LORazepam  2 mg Intramuscular Q6H PRN Jasmina Douglas MD      hydrOXYzine HCL  25 mg Oral Q6H PRN Max 4/day Jasmina Douglas MD      melatonin  3 mg Oral HS Jasmina Douglas MD      polyethylene glycol  17 g Oral Daily PRN Jasmina Douglas MD      propranolol  10 mg Oral Q8H PRN Jasmina Douglas MD      risperiDONE  2 mg Oral BID Siobhan Beltran MD      senna-docusate sodium  1 tablet Oral Daily PRN Jasmina Douglas MD      traZODone  50 mg Oral HS PRN Jasmina Douglas MD         Assessment & Plan     Principal Problem:    Psychosis (HCC)      Recommended  Treatment:     At this time Jah is acutely psychotic and does not have a capacity to make decisions regarding refusal of necessary psychiatric treatment.   I believe that Jah requires administration of injectable medications against his will to achieve clinical improvement and assure safety of self and others.    Jewel Ozuna MD 06/16/25

## 2025-06-16 NOTE — PLAN OF CARE
Problem: Alteration in Thoughts and Perception  Goal: Attend and participate in unit activities, including therapeutic, recreational, and educational groups  Description: Interventions:  -Encourage Visitation and family involvement in care  Outcome: Not Progressing     Problem: Ineffective Coping  Goal: Participates in unit activities  Description: Interventions:  - Provide therapeutic environment   - Provide required programming   - Redirect inappropriate behaviors   Outcome: Not Progressing   New patient will be encouraged to attend groups

## 2025-06-16 NOTE — NURSING NOTE
Pt Belongings    Remains with Pt.:    Underwear x3  Red Sweatshirt x1  Red Plaid Pants x1  Black Sweat pants x1  Black T-shirts x2  Blue T-shirt x1  Toothbrush x1  Tooth paste x1  Deoderant x1  Bar Soap x2  Shampoo x1  Photos x2  Hard Candy x1 Bag    Storage #14:  Long Socks x3 Pairs  Underwear x1    No Contraband     No Meds

## 2025-06-16 NOTE — ASSESSMENT & PLAN NOTE
Started risperidone dissolvable tablets 2 mg daily at bedtime for symptoms of psychosis on admission but refuses medication. .     6/16/25- continues to refuse medication  Patient demonstrates poor insight into his current psychotic condition.  His condition is highly unlikely to improve without the use of psychiatric medications, specifically antipsychotics.  In this context, 302 to be pursued and the patient will require medications over objection.  Request to Dr Ozuna for 2nd opinion for medication over objection.

## 2025-06-16 NOTE — NURSING NOTE
Patient is refusing admission blood work at this time. Will continue to educate and encourage. Very religiously preoccupied and internally preoccupied at this time.

## 2025-06-16 NOTE — NURSING NOTE
Patient withdrawn to their room the entirety of the evening. During brief attempt at assessment and attempting to administer scheduled medication but the patient refused. Uninterested in discussion. No needs identified. Q15 minute checks ongoing.

## 2025-06-16 NOTE — PROGRESS NOTES
Progress Note - Behavioral Health     Jah Morejon Jr. 24 y.o. male MRN: [id number]   Unit/Bed#: [facility] 224-01 Encounter: [id number]    Assessment & Plan  Psychosis (HCC)  Started risperidone dissolvable tablets 2 mg daily at bedtime for symptoms of psychosis on admission but refuses medication. .     6/16/25- continues to refuse medication  Patient demonstrates poor insight into his current psychotic condition.  His condition is highly unlikely to improve without the use of psychiatric medications, specifically antipsychotics.  In this context, 302 to be pursued and the patient will require medications over objection.  Request to Dr Ozuna for 2nd opinion for medication over objection.        Risks / Benefits of Treatment:    Risks, benefits, and possible side effects of medications explained to patient. Patient does not verbalize understanding of benefits or risks of treatment refusal at this time and needs ongoing explanation of treatment benefits and treatment plan.  Behavior over the last 24 hours: unchanged.     Jah seen today, per staff report has been bizarre and refusing medication on the unit.  Pt seen with treatment team today, he is internally preoccupied. Endorses AH with derogatory running commentary. Is internally preoccupied. Has poor insight and judgement, continues to refuse medication.        Mental Status Evaluation:    Appearance:  disheveled, marginal hygiene, looks stated age   Behavior:  bizarre   Speech:  slow, scant   Mood:  euthymic   Affect:  blunted   Thought Process:  illogical, poverty of thought   Associations: circumstantial associations   Thought Content:  paranoid ideation, ideas of reference   Perceptual Disturbances: auditory hallucinations   Risk Potential: Suicidal ideation - None  Homicidal ideation - None   Sensorium:  oriented to person, place, and time/date   Memory:  recent and remote memory grossly intact   Consciousness:  alert and awake   Attention: decreased concentration  and decreased attention span   Insight:  impaired   Judgment: impaired   Gait/Station: normal gait/station   Motor Activity: no abnormal movements     Vital signs in last 24 hours:    Temp:  [97.6 °F (36.4 °C)-98.5 °F (36.9 °C)] 98.5 °F (36.9 °C)  HR:  [] 89  BP: (113-128)/(59-76) 128/76  Resp:  [18] 18  SpO2:  [97 %] 97 %    Laboratory results: I have personally reviewed all pertinent laboratory/tests results.    Encourage group therapy, milieu therapy and occupational therapy  Behavioral Health checks for safety monitoring  Continue treatment with group therapy, milieu therapy and occupational therapy  Will petition 302 involuntary commitment  Current Facility-Administered Medications   Medication Dose Route Frequency Provider Last Rate    acetaminophen  650 mg Oral Q6H PRN Jasmina Douglas MD      acetaminophen  650 mg Oral Q4H PRN Jasmina Douglas MD      acetaminophen  975 mg Oral Q6H PRN Jasmina Douglas MD      aluminum-magnesium hydroxide-simethicone  30 mL Oral Q4H PRN Jasmina Douglas MD      haloperidol lactate  2.5 mg Intramuscular Q4H PRN Max 4/day Jasmina Douglas MD      And    LORazepam  1 mg Intramuscular Q4H PRN Max 4/day Jasmina Douglas MD      And    benztropine  0.5 mg Intramuscular Q4H PRN Max 4/day Jasmina Douglas MD      haloperidol lactate  5 mg Intramuscular Q4H PRN Max 4/day Jasmina Douglas MD      And    LORazepam  2 mg Intramuscular Q4H PRN Max 4/day Jasmina Douglas MD      And    benztropine  1 mg Intramuscular Q4H PRN Max 4/day Jasmina Douglas MD      benztropine  1 mg Intramuscular Q4H PRN Max 6/day Jasmina Douglas MD      benztropine  1 mg Oral Q4H PRN Max 6/day Jasmina Douglsa MD      bisacodyl  10 mg Rectal Daily PRN Jasmina Douglas MD      hydrOXYzine HCL  50 mg Oral Q6H PRN Max 4/day Jasmina Douglas MD      Or    diphenhydrAMINE  50 mg Intramuscular Q6H PRN Jasmina Douglas MD      haloperidol  1 mg Oral Q6H PRN Jasmina Douglas MD      haloperidol   2.5 mg Oral Q4H PRN Max 4/day Jasmina Douglas MD      haloperidol  5 mg Oral Q4H PRN Max 4/day Jasmina Douglas MD      hydrOXYzine HCL  100 mg Oral Q6H PRN Max 4/day Jasmina Douglas MD      Or    LORazepam  2 mg Intramuscular Q6H PRN Jasmina Douglas MD      hydrOXYzine HCL  25 mg Oral Q6H PRN Max 4/day Jasmina Douglas MD      melatonin  3 mg Oral HS Jasmina Douglas MD      polyethylene glycol  17 g Oral Daily PRN Jasmina Douglas MD      propranolol  10 mg Oral Q8H PRN Jasmina Douglas MD      risperiDONE  2 mg Oral BID Siobhan Beltran MD      senna-docusate sodium  1 tablet Oral Daily PRN Jasmina Douglas MD      traZODone  50 mg Oral HS PRN Jasmina Douglas MD         Counseling / Coordination of Care:    Patient's progress discussed with staff in treatment team meeting.  Medication changes reviewed with nursing staff.  Medication changes reviewed with staff in treatment team meeting.  At this time patient has limited understanding of diagnosis, medication changes and treatment plan and will require further explanation.  Will require being placed on 302 due to noncompliance with treatment, refusal of medication    Siobhan Beltran MD 06/16/25

## 2025-06-17 LAB
ALBUMIN SERPL BCG-MCNC: 4.6 G/DL (ref 3.5–5)
ALP SERPL-CCNC: 40 U/L (ref 34–104)
ALT SERPL W P-5'-P-CCNC: 11 U/L (ref 7–52)
ANION GAP SERPL CALCULATED.3IONS-SCNC: 8 MMOL/L (ref 4–13)
AST SERPL W P-5'-P-CCNC: 26 U/L (ref 13–39)
BASOPHILS # BLD AUTO: 0.06 THOUSANDS/ÂΜL (ref 0–0.1)
BASOPHILS NFR BLD AUTO: 1 % (ref 0–1)
BILIRUB SERPL-MCNC: 0.63 MG/DL (ref 0.2–1)
BUN SERPL-MCNC: 13 MG/DL (ref 5–25)
CALCIUM SERPL-MCNC: 9.2 MG/DL (ref 8.4–10.2)
CHLORIDE SERPL-SCNC: 105 MMOL/L (ref 96–108)
CHOLEST SERPL-MCNC: 148 MG/DL (ref ?–200)
CO2 SERPL-SCNC: 27 MMOL/L (ref 21–32)
CREAT SERPL-MCNC: 1 MG/DL (ref 0.6–1.3)
EOSINOPHIL # BLD AUTO: 0.2 THOUSAND/ÂΜL (ref 0–0.61)
EOSINOPHIL NFR BLD AUTO: 3 % (ref 0–6)
ERYTHROCYTE [DISTWIDTH] IN BLOOD BY AUTOMATED COUNT: 10.5 % (ref 11.6–15.1)
GFR SERPL CREATININE-BSD FRML MDRD: 105 ML/MIN/1.73SQ M
GLUCOSE SERPL-MCNC: 95 MG/DL (ref 65–140)
HCT VFR BLD AUTO: 46.7 % (ref 36.5–49.3)
HDLC SERPL-MCNC: 45 MG/DL
HGB BLD-MCNC: 15.1 G/DL (ref 12–17)
IMM GRANULOCYTES # BLD AUTO: 0.02 THOUSAND/UL (ref 0–0.2)
IMM GRANULOCYTES NFR BLD AUTO: 0 % (ref 0–2)
LDLC SERPL CALC-MCNC: 79 MG/DL (ref 0–100)
LYMPHOCYTES # BLD AUTO: 2.01 THOUSANDS/ÂΜL (ref 0.6–4.47)
LYMPHOCYTES NFR BLD AUTO: 30 % (ref 14–44)
MCH RBC QN AUTO: 30.9 PG (ref 26.8–34.3)
MCHC RBC AUTO-ENTMCNC: 32.3 G/DL (ref 31.4–37.4)
MCV RBC AUTO: 96 FL (ref 82–98)
MONOCYTES # BLD AUTO: 0.58 THOUSAND/ÂΜL (ref 0.17–1.22)
MONOCYTES NFR BLD AUTO: 9 % (ref 4–12)
NEUTROPHILS # BLD AUTO: 3.93 THOUSANDS/ÂΜL (ref 1.85–7.62)
NEUTS SEG NFR BLD AUTO: 57 % (ref 43–75)
NONHDLC SERPL-MCNC: 103 MG/DL
NRBC BLD AUTO-RTO: 0 /100 WBCS
PLATELET # BLD AUTO: 285 THOUSANDS/UL (ref 149–390)
PMV BLD AUTO: 9.2 FL (ref 8.9–12.7)
POTASSIUM SERPL-SCNC: 3.7 MMOL/L (ref 3.5–5.3)
PROT SERPL-MCNC: 7.4 G/DL (ref 6.4–8.4)
RBC # BLD AUTO: 4.89 MILLION/UL (ref 3.88–5.62)
SODIUM SERPL-SCNC: 140 MMOL/L (ref 135–147)
TRIGL SERPL-MCNC: 120 MG/DL (ref ?–150)
TSH SERPL DL<=0.05 MIU/L-ACNC: 3.12 UIU/ML (ref 0.45–4.5)
WBC # BLD AUTO: 6.8 THOUSAND/UL (ref 4.31–10.16)

## 2025-06-17 PROCEDURE — 84443 ASSAY THYROID STIM HORMONE: CPT | Performed by: PSYCHIATRY & NEUROLOGY

## 2025-06-17 PROCEDURE — 80053 COMPREHEN METABOLIC PANEL: CPT | Performed by: PSYCHIATRY & NEUROLOGY

## 2025-06-17 PROCEDURE — 82746 ASSAY OF FOLIC ACID SERUM: CPT | Performed by: PSYCHIATRY & NEUROLOGY

## 2025-06-17 PROCEDURE — 82306 VITAMIN D 25 HYDROXY: CPT | Performed by: PSYCHIATRY & NEUROLOGY

## 2025-06-17 PROCEDURE — 99233 SBSQ HOSP IP/OBS HIGH 50: CPT

## 2025-06-17 PROCEDURE — 86780 TREPONEMA PALLIDUM: CPT | Performed by: PSYCHIATRY & NEUROLOGY

## 2025-06-17 PROCEDURE — 85025 COMPLETE CBC W/AUTO DIFF WBC: CPT | Performed by: PSYCHIATRY & NEUROLOGY

## 2025-06-17 PROCEDURE — 82607 VITAMIN B-12: CPT | Performed by: PSYCHIATRY & NEUROLOGY

## 2025-06-17 PROCEDURE — 80061 LIPID PANEL: CPT | Performed by: PSYCHIATRY & NEUROLOGY

## 2025-06-17 RX ORDER — RISPERIDONE 2 MG/1
2 TABLET, ORALLY DISINTEGRATING ORAL 2 TIMES DAILY
Status: DISCONTINUED | OUTPATIENT
Start: 2025-06-17 | End: 2025-06-19

## 2025-06-17 RX ORDER — OLANZAPINE 10 MG/2ML
5 INJECTION, POWDER, FOR SOLUTION INTRAMUSCULAR 2 TIMES DAILY
Status: DISCONTINUED | OUTPATIENT
Start: 2025-06-17 | End: 2025-06-19

## 2025-06-17 RX ADMIN — OLANZAPINE 5 MG: 10 INJECTION, POWDER, FOR SOLUTION INTRAMUSCULAR at 14:02

## 2025-06-17 RX ADMIN — OLANZAPINE 5 MG: 10 INJECTION, POWDER, FOR SOLUTION INTRAMUSCULAR at 18:56

## 2025-06-17 NOTE — SOCIAL WORK
CM reached out to [facility]( [phone] regarding pt's 302 hearing paperwork. CM was transferred to [facility] at ext. [phone]. CM left message for a callback.

## 2025-06-17 NOTE — PROGRESS NOTES
06/17/25 1431   Referral Data   Referral Source Family  (pt patrents brought him to emergency room)   Referral Reason Psych   County Information   County of Residence [place]   Readmission Root Cause   30 Day Readmission No   Patient Information   Mental Status Alert   Primary Caregiver Family  (pt reports family provides for him.)   Support System Immediate family  (mother, father)   Legal Information   Tx Plan Signed Yes  (pt and tx team signed his treatment plan.)   Current Status: 201   Activities of Daily Living Prior to Admission   Functional Status Independent   Living Arrangement House;Lives with someone  (pt reports living with his father)   Ambulation Independent   Access to Firearms   Access to Firearms No   Income Information   Income Source Unemployed   Means of Transportation   Means of Transport to Appts: Drives Self  (pt reported that he just got his 's license and a car)

## 2025-06-17 NOTE — PROGRESS NOTES
"Progress Note - Jah Morejon Jr. 24 y.o. male MRN: [id number]    Unit/Bed#: [facility] 224-01 Encounter: [id number]        Subjective:   Patient seen and examined at bedside after reviewing the chart and discussing the case with the caring staff.      Patient examined at bedside.  Patient asleep.  No issues reported by staff.     Patient refusing labs.     Physical Exam   Vitals: Blood pressure 116/75, pulse 88, temperature (!) 96.9 °F (36.1 °C), temperature source Temporal, resp. rate 16, height 5' 9\" (1.753 m), weight 67.1 kg (148 lb), SpO2 95%.,Body mass index is 21.86 kg/m².  Constitutional: Patient in no acute distress.  HEENT: PERR, EOMI, MMM.  Cardiovascular: Normal rate and regular rhythm.    Pulmonary/Chest: Effort normal and breath sounds normal.   Abdomen: Soft, + BS, NT.    Assessment/Plan:  Jah Morejon Jr. is a(n) 24 y.o. male with MDD.     Arthralgias/HA.  Tylenol as needed.   Insomnia.  Patient on melatonin 3 mg at bedtime.   Hx vitamin deficiencies.  Recheck levels.     The patient was discussed with Dr. Zambrano and he is in agreement with the above note.  "

## 2025-06-17 NOTE — PROGRESS NOTES
06/16/25 0900   Team Members Present   Team Members Present Physician;Nurse;;Occupational Therapist   Physician Team Member MD Clara Beltran MD Medei CRNP   Nursing Team Member Geoffrey SILVA   Care Management Team Member MS Kevin   OT Team Member Pope JUDI   Patient/Family Present   Patient Present No   Patient's Family Present No   201 New admit. Responding to AVH. Refusing meds. Eating/sleeping well.  Poor impulse control. Stressors hearing voices. D/C not known due to med adjust.

## 2025-06-17 NOTE — PROGRESS NOTES
06/16/25 1015   Team Meeting   Meeting Type Tx Team Meeting   Team Members Present   Team Members Present Physician;;Nurse   Physician Team Member MD Devin   Nursing Team Member SHIRA Grover   Care Management Team Member MS Kevin   Patient/Family Present   Patient Present Yes   Patient's Family Present No     Patient and treatment team met and reviewed pt strengths, limitations, coping skills, treatment plan and goal; agreeable and signed; copy on chart.

## 2025-06-17 NOTE — NURSING NOTE
Patient isolative and sleeping until lunch.  He did get OOB 1 time to take his AM medication which he refused to take anyway.  He Immediately returned to his room and fell back to sleep.  A second opinion to force meds was completed and orders were received.  Patient made aware, offered to take PO Risperdal, patient refused therefore, 5 mg IM zyprexa given in left deltoid.  More visible in the milieu in the afternoon.   Attending minimal groups.  Denies depression, anxiety, SI/HI and AH/VH to this writer.  However, patient is noted to be preoccupied, no overt RIS noted.  Patient able to make needs known.  Safety precautions maintained.

## 2025-06-17 NOTE — NURSING NOTE
Medication was effective. Patient has been resting in 243 as a precautionary measure due to comments about dying and his father's deep concern. No attempts to harm himself have been made. Some residual verbal outbursts were made but the patient has not escalated or become combative. He continues to be bizarre and very internally preoccupied. Q15 minute checks ongoing.

## 2025-06-17 NOTE — PROGRESS NOTES
06/17/25 1436   Patient Intake   Special Needs none   Living Arrangement Lives with someone  (pt lives with his father Jah Morejon Sr. [phone])   Can patient return home? Yes   Address to be Discharge to: [address] [place] [address] [address] [id number]   Patient's Telephone Number [phone]   Access to Firearms No   Work History Unemployed   School Grade/Year 8th  (Pt reported that he did get his GED)   Admission Status   Status of Admission 201   [facility]   [place] 77 N/A   County Notified? No   Patient History   Presenting Problems see ED notes   Treatment History see hx of ip stay   Currently in Treatment No   Medical Problems See H&P   Legal Issues denied   Substance Abuse No  (pt deneid any substances at this time)   Crisis Info   Release of Information Signed Patient Refused

## 2025-06-17 NOTE — PROGRESS NOTES
Progress Note - Behavioral Health   Name: Jah Morejon Jr. 24 y.o. male I MRN: [id number]  Unit/Bed#: -01 I Date of Admission: 6/14/2025   Date of Service: 6/17/2025 I Hospital Day: 3    Assessment & Plan  Psychosis (HCC)  Started risperidone dissolvable tablets 2 mg daily at bedtime for symptoms of psychosis on admission but refuses medication. .     6/16/25- continues to refuse medication  Patient demonstrates poor insight into his current psychotic condition.  His condition is highly unlikely to improve without the use of psychiatric medications, specifically antipsychotics.  In this context, 302 to be pursued and the patient will require medications over objection.  Request to Dr Ozuna for 2nd opinion for medication over objection.     Current Medications:    Current Facility-Administered Medications:     melatonin tablet 3 mg, Oral, HS    risperiDONE (RisperDAL M-TAB) disintegrating tablet 2 mg, Oral, BID **OR** OLANZapine (ZyPREXA) IM injection 5 mg, Intramuscular, BID    Current Facility-Administered Medications:     acetaminophen (TYLENOL) tablet 650 mg, Oral, Q6H PRN    acetaminophen (TYLENOL) tablet 650 mg, Oral, Q4H PRN    acetaminophen (TYLENOL) tablet 975 mg, Oral, Q6H PRN    aluminum-magnesium hydroxide-simethicone (MAALOX) oral suspension 30 mL, Oral, Q4H PRN    haloperidol lactate (HALDOL) injection 2.5 mg, Intramuscular, Q4H PRN Max 4/day **AND** LORazepam (ATIVAN) injection 1 mg, Intramuscular, Q4H PRN Max 4/day **AND** benztropine (COGENTIN) injection 0.5 mg, Intramuscular, Q4H PRN Max 4/day    haloperidol lactate (HALDOL) injection 5 mg, Intramuscular, Q4H PRN Max 4/day **AND** LORazepam (ATIVAN) injection 2 mg, Intramuscular, Q4H PRN Max 4/day **AND** benztropine (COGENTIN) injection 1 mg, Intramuscular, Q4H PRN Max 4/day    benztropine (COGENTIN) injection 1 mg, Intramuscular, Q4H PRN Max 6/day    benztropine (COGENTIN) tablet 1 mg, Oral, Q4H PRN Max 6/day    bisacodyl (DULCOLAX) rectal  suppository 10 mg, Rectal, Daily PRN    hydrOXYzine HCL (ATARAX) tablet 50 mg, Oral, Q6H PRN Max 4/day **OR** diphenhydrAMINE (BENADRYL) injection 50 mg, Intramuscular, Q6H PRN    haloperidol (HALDOL) tablet 1 mg, Oral, Q6H PRN    haloperidol (HALDOL) tablet 2.5 mg, Oral, Q4H PRN Max 4/day    haloperidol (HALDOL) tablet 5 mg, Oral, Q4H PRN Max 4/day    hydrOXYzine HCL (ATARAX) tablet 100 mg, Oral, Q6H PRN Max 4/day **OR** LORazepam (ATIVAN) injection 2 mg, Intramuscular, Q6H PRN    hydrOXYzine HCL (ATARAX) tablet 25 mg, Oral, Q6H PRN Max 4/day    polyethylene glycol (MIRALAX) packet 17 g, Oral, Daily PRN    propranolol (INDERAL) tablet 10 mg, Oral, Q8H PRN    senna-docusate sodium (SENOKOT S) 8.6-50 mg per tablet 1 tablet, Oral, Daily PRN    traZODone (DESYREL) tablet 50 mg, Oral, HS PRN    Risks/Benefits of Treatment:     Risks, benefits, and possible side effects of medications explained to patient and patient verbalizes understanding and agreement for treatment.    Treatment Planning:     All current active medications have been reviewed.  Continue to monitor response to treatment and assess for potential side effects of medications.  Encourage group therapy, milieu therapy and occupational therapy.  Collaboration with medical service for medical comorbidities as indicated.  Behavioral Health checks for safety monitoring.  Estimated Discharge Day: 6/21/2025 7 days (6/24/2025)  Legal Status : Voluntary 201 commitment.  Long Stay Certification : Not Applicable    Subjective     Behavior over the last 24 hours: Toshia Tyson was seen today for psychiatric follow-up.  On assessment patient is withdrawn to his room, isolated to self. On assessment patient appears suspicious, paranoid. He is bizarre, laughs inappropriately.  He endorses auditory hallucinations of voices telling him negative things.  Patient appears internally preoccupied.  His mood is controlled the unit with no recent behaviors.  He denied any  "SI/HI.    Sleep: normal  Appetite: normal  Medication side effects: No  ROS: review of systems as noted above in HPI/Subjective report, all other systems are negative    Objective :  Temp:  [96.9 °F (36.1 °C)-98 °F (36.7 °C)] 96.9 °F (36.1 °C)  HR:  [88-93] 88  BP: (116-119)/(67-75) 116/75  Resp:  [16-18] 16  SpO2:  [95 %-97 %] 95 %  O2 Device: None (Room air)    Mental Status Evaluation:    Appearance:  age appropriate, marginal hygiene   Behavior:  suspicious, bizarre   Speech:  scant   Mood:  \"alright\"   Affect:  blunted   Thought Process:  Poverty of thought   Thought Content:  paranoid ideation   Perceptual Disturbances: Endorsed auditory hallucinations    Risk Potential: Suicidal Ideation -  None at present  Homicidal Ideation -  None at present  Potential for Aggression - No   Sensorium:  oriented to person, place, and time/date   Memory:  recent and remote memory grossly intact   Consciousness:  alert and awake   Attention/Concentration: decreased attention span and decreased concentration   Insight:  impaired   Judgment: impaired   Gait/Station: normal gait/station   Motor Activity: no abnormal movements       Lab Results: I have reviewed the following results:  Most Recent Labs:   Lab Results   Component Value Date    WBC 5.81 01/20/2025    RBC 4.31 01/20/2025    HGB 13.6 01/20/2025    HCT 41.2 01/20/2025     01/20/2025    RDW 10.5 (L) 01/20/2025    NEUTROABS 3.30 01/20/2025    SODIUM 142 01/20/2025    K 4.0 01/20/2025     01/20/2025    CO2 29 01/20/2025    BUN 10 01/20/2025    CREATININE 0.93 01/20/2025    GLUC 89 01/20/2025    CALCIUM 8.9 01/20/2025    AST 14 01/20/2025    ALT 10 01/20/2025    ALKPHOS 32 (L) 01/20/2025    TP 6.4 01/20/2025    ALB 4.1 01/20/2025    TBILI 0.60 01/20/2025    CHOLESTEROL 118 01/20/2025    HDL 44 01/20/2025    TRIG 85 01/20/2025    LDLCALC 57 01/20/2025    NONHDLC 74 01/20/2025    CIW1GMVSCWNO 3.198 01/20/2025    SYPHILISAB Non-reactive 01/20/2025    HGBA1C 4.2 " "01/20/2025    EAG 74 01/20/2025       Administrative Statements     Counseling / Coordination of Care:   Patient's progress discussed with staff in treatment team meeting.  Medication changes reviewed with staff in treatment team meeting.    Portions of the record may have been created with voice recognition software. Occasional wrong word or \"sound a like\" substitutions may have occurred due to the inherent limitations of voice recognition software. Read the chart carefully and recognize, using context, where substitutions have occurred.    ALPHONSO Mix 06/17/25  "

## 2025-06-17 NOTE — TREATMENT TEAM
"   06/16/25 2103   Broset Violence Checklist   Assessment type Shift   Irritability 1   Confusion 1   Boisterousness 1   Threatening physical violence 1   Verbal threats 0   Violence 0   Broset score 4     Patient has been boisterous, disorganized, verbally aggressive, and now has been observed threatening and following another peer. The patient is not interested in discussion about his care or symptomology. Repeating \"shut up bitch\" over and over again. Administered IM haldol 5, cogentin 1, and ativan 2 mg between the right and left deltoids. Security assisted with escort to room 243. Patient reports \"I will be dying tonight and you bitches are going to be cleaning up my shit\". Attempted to emotionally support the patient but he continues to be belligerent, but calmly laying down. Patient's father also called to leave a message stating the patient told him the same thing on the phone. Q15 minute checks ongoing.  "

## 2025-06-17 NOTE — PROGRESS NOTES
06/17/25 1434   Admission   Admission Source Emergency department   Status of Admission 201   Why are you here? regressed mood-psychotic features   Patient Strengths Family ties;Negotiates basic needs   Patient Limitations Limited motivation;Poor insight;Poor reasoning ability   Release of Information Signed Patient Refused  (pt refused to sign consent for his father, pcp and is not open to psych op referral at this time.)
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
## 2025-06-18 LAB
25(OH)D3 SERPL-MCNC: 13.7 NG/ML (ref 30–100)
FOLATE SERPL-MCNC: >22.3 NG/ML
TREPONEMA PALLIDUM IGG+IGM AB [PRESENCE] IN SERUM OR PLASMA BY IMMUNOASSAY: NORMAL
VIT B12 SERPL-MCNC: 493 PG/ML (ref 180–914)

## 2025-06-18 PROCEDURE — 99233 SBSQ HOSP IP/OBS HIGH 50: CPT

## 2025-06-18 RX ORDER — ERGOCALCIFEROL 1.25 MG/1
50000 CAPSULE, LIQUID FILLED ORAL WEEKLY
Status: DISCONTINUED | OUTPATIENT
Start: 2025-06-18 | End: 2025-06-25

## 2025-06-18 RX ADMIN — OLANZAPINE 5 MG: 10 INJECTION, POWDER, FOR SOLUTION INTRAMUSCULAR at 11:16

## 2025-06-18 RX ADMIN — OLANZAPINE 5 MG: 10 INJECTION, POWDER, FOR SOLUTION INTRAMUSCULAR at 18:39

## 2025-06-18 NOTE — NURSING NOTE
Patient withdrawn to room, comes out for meals and is minimal in communication. Patient denies all, no anxiety or depression, denies SI/HI and hallucinations. Patient refused to take any pills. Patient preferred Zyprexa IM 5 mg injection and given without incident. Continued care and safety rounds in progress.

## 2025-06-18 NOTE — PROGRESS NOTES
"Progress Note - Jah Morejon Jr. 24 y.o. male MRN: [id number]    Unit/Bed#: [facility] 224-01 Encounter: [id number]        Subjective:   Patient seen and examined at bedside after reviewing the chart and discussing the case with the caring staff.      Patient examined at bedside.  Patient asleep.  No issues reported by staff.     Physical Exam   Vitals: Blood pressure 117/67, pulse 73, temperature 97.8 °F (36.6 °C), temperature source Temporal, resp. rate 18, height 5' 9\" (1.753 m), weight 67.1 kg (148 lb), SpO2 96%.,Body mass index is 21.86 kg/m².  Constitutional: Patient in no acute distress.  HEENT: PERR, EOMI, MMM.  Cardiovascular: Normal rate and regular rhythm.    Pulmonary/Chest: Effort normal and breath sounds normal.   Abdomen: Soft, + BS, NT.    Assessment/Plan:  Jah Morejon Jr. is a(n) 24 y.o. male with MDD.     Arthralgias/HA.  Tylenol as needed.   Insomnia.  Patient on melatonin 3 mg at bedtime.   Vitamin D deficiency.  Level 13.7 on 6/17.  Start Vit D2 50,000 units weekly x 10 weeks followed by Vit D3 1,000 units daily.       The patient was discussed with Dr. Zambrano and he is in agreement with the above note.  "

## 2025-06-18 NOTE — NURSING NOTE
Patient intermittently visible. Isolative to self. Patient minimal on assessment. Denies SI,HI,AVH. Safety checks continue Q 15 minutes.

## 2025-06-18 NOTE — ASSESSMENT & PLAN NOTE
Continue Medication over objection for Risperdal 2 mg po BID   Patient demonstrates poor insight into his current psychotic condition.  His condition is highly unlikely to improve without the use of psychiatric medications, specifically antipsychotics.

## 2025-06-18 NOTE — PROGRESS NOTES
06/18/25 9:00am   Team Members Present   Team Members Present Physician;Nurse;;Occupational Therapist   Physician Team Member MD Clara Beltran MD Medei, CRNP   Nursing Team Member Libby ROSA RN   Care Management Team Member MS Kevin   OT Team Member JUDI Key, JUDI Wilkins   Patient/Family Present   Patient Present No   Patient's Family Present No   201. Been doing okay. No outbursts. Does appear respond to internal stimuli. Eating/sleeping well. MOO.   Social with peers. Refused this morning (risperdal) D/C not known due to med adjust.

## 2025-06-18 NOTE — PROGRESS NOTES
06/17/25 0900   Team Meeting   Meeting Type Daily Rounds   Team Members Present   Team Members Present Physician;Nurse;;Occupational Therapist   Physician Team Member MD Clara Beltran MD, ALPHONSO Jauregui   Nursing Team Member SHIRA Magana   Care Management Team Member MS Kevin   OT Team Member JUDI Key   Patient/Family Present   Patient Present No   Patient's Family Present No   302. Stopped meds prior to admission. Religiously preoccupied. Bizzare and aggressive at times. JOI did not sign one for dad. Refusing meds. MOO. Discharge date not known due to med adjustment.

## 2025-06-18 NOTE — NURSING NOTE
Patient slept uninterrupted throughout the night without signs of distress. Non labored breathing observed. Continuous q15 minute rounding and safety checks ongoing.

## 2025-06-18 NOTE — PROGRESS NOTES
Progress Note - Behavioral Health   Name: Jah Morejon Jr. 24 y.o. male I MRN: [id number]  Unit/Bed#: U 224-01 I Date of Admission: 6/14/2025   Date of Service: 6/18/2025 I Hospital Day: 4    Assessment & Plan  Psychosis (HCC)  Continue Medication over objection for Risperdal 2 mg po BID   Patient demonstrates poor insight into his current psychotic condition.  His condition is highly unlikely to improve without the use of psychiatric medications, specifically antipsychotics.    Current Medications:    Current Facility-Administered Medications:     ergocalciferol (VITAMIN D2) capsule 50,000 Units, Oral, Weekly    melatonin tablet 3 mg, Oral, HS    risperiDONE (RisperDAL M-TAB) disintegrating tablet 2 mg, Oral, BID **OR** OLANZapine (ZyPREXA) IM injection 5 mg, Intramuscular, BID    Current Facility-Administered Medications:     acetaminophen (TYLENOL) tablet 650 mg, Oral, Q6H PRN    acetaminophen (TYLENOL) tablet 650 mg, Oral, Q4H PRN    acetaminophen (TYLENOL) tablet 975 mg, Oral, Q6H PRN    aluminum-magnesium hydroxide-simethicone (MAALOX) oral suspension 30 mL, Oral, Q4H PRN    haloperidol lactate (HALDOL) injection 2.5 mg, Intramuscular, Q4H PRN Max 4/day **AND** LORazepam (ATIVAN) injection 1 mg, Intramuscular, Q4H PRN Max 4/day **AND** benztropine (COGENTIN) injection 0.5 mg, Intramuscular, Q4H PRN Max 4/day    haloperidol lactate (HALDOL) injection 5 mg, Intramuscular, Q4H PRN Max 4/day **AND** LORazepam (ATIVAN) injection 2 mg, Intramuscular, Q4H PRN Max 4/day **AND** benztropine (COGENTIN) injection 1 mg, Intramuscular, Q4H PRN Max 4/day    benztropine (COGENTIN) injection 1 mg, Intramuscular, Q4H PRN Max 6/day    benztropine (COGENTIN) tablet 1 mg, Oral, Q4H PRN Max 6/day    bisacodyl (DULCOLAX) rectal suppository 10 mg, Rectal, Daily PRN    hydrOXYzine HCL (ATARAX) tablet 50 mg, Oral, Q6H PRN Max 4/day **OR** diphenhydrAMINE (BENADRYL) injection 50 mg, Intramuscular, Q6H PRN    haloperidol (HALDOL)  "tablet 1 mg, Oral, Q6H PRN    haloperidol (HALDOL) tablet 2.5 mg, Oral, Q4H PRN Max 4/day    haloperidol (HALDOL) tablet 5 mg, Oral, Q4H PRN Max 4/day    hydrOXYzine HCL (ATARAX) tablet 100 mg, Oral, Q6H PRN Max 4/day **OR** LORazepam (ATIVAN) injection 2 mg, Intramuscular, Q6H PRN    hydrOXYzine HCL (ATARAX) tablet 25 mg, Oral, Q6H PRN Max 4/day    polyethylene glycol (MIRALAX) packet 17 g, Oral, Daily PRN    propranolol (INDERAL) tablet 10 mg, Oral, Q8H PRN    senna-docusate sodium (SENOKOT S) 8.6-50 mg per tablet 1 tablet, Oral, Daily PRN    traZODone (DESYREL) tablet 50 mg, Oral, HS PRN    Risks/Benefits of Treatment:     Risks, benefits, and possible side effects of medications explained to patient and patient verbalizes understanding and agreement for treatment.      Treatment Planning:     All current active medications have been reviewed.  Continue to monitor response to treatment and assess for potential side effects of medications.  Encourage group therapy, milieu therapy and occupational therapy.  Collaboration with medical service for medical comorbidities as indicated.  Behavioral Health checks for safety monitoring.  Estimated Discharge Day: 6/21/2025 7 days (6/25/2025)  Legal Status : On 303 commitment.  Long Stay Certification : Not Applicable    Subjective     Behavior over the last 24 hours: rajani Tyson was seen today for psychiatric follow-up. Patient is intermittently visible on the unit for meals. He is mostly withdrawn to his room isolative to self, minimally social with peers. He remains religiously preoccupied, bizarre, suspicious and Paranoid. Patient reports his mood is \"good\" and that Artie loves writer. His speech is scant and he appears internally preoccupied. He denied any SI/HI when asked.    Sleep: normal  Appetite: normal  Medication side effects: No  ROS: review of systems as noted above in HPI/Subjective report, all other systems are negative    Objective :  Temp:  [97.8 °F " "(36.6 °C)] 97.8 °F (36.6 °C)  HR:  [73-80] 73  BP: (117-128)/(61-67) 117/67  Resp:  [18] 18  SpO2:  [96 %] 96 %  O2 Device: None (Room air)    Mental Status Evaluation:    Appearance:  Age appropriate, disheveled   Behavior:  cooperative   Speech:  scant   Mood:  \"good\"   Affect:  blunted   Thought Process:  poverty of thought   Thought Content:  Paranoid ideation, religiously preoccupied   Perceptual Disturbances: Appears internally preoccupied   Risk Potential: Suicidal Ideation -  None at present  Homicidal Ideation -  None at present  Potential for Aggression - No   Sensorium:  oriented to person, place, and time/date   Memory:  recent and remote memory grossly intact   Consciousness:  alert and awake   Attention/Concentration: decreased attention span and decreased concentration   Insight:  impaired   Judgment: impaired   Gait/Station: In bed   Motor Activity: no abnormal movements       Lab Results: I have reviewed the following results:  Most Recent Labs:   Lab Results   Component Value Date    WBC 6.80 06/17/2025    RBC 4.89 06/17/2025    HGB 15.1 06/17/2025    HCT 46.7 06/17/2025     06/17/2025    RDW 10.5 (L) 06/17/2025    NEUTROABS 3.93 06/17/2025    SODIUM 140 06/17/2025    K 3.7 06/17/2025     06/17/2025    CO2 27 06/17/2025    BUN 13 06/17/2025    CREATININE 1.00 06/17/2025    GLUC 95 06/17/2025    CALCIUM 9.2 06/17/2025    AST 26 06/17/2025    ALT 11 06/17/2025    ALKPHOS 40 06/17/2025    TP 7.4 06/17/2025    ALB 4.6 06/17/2025    TBILI 0.63 06/17/2025    CHOLESTEROL 148 06/17/2025    HDL 45 06/17/2025    TRIG 120 06/17/2025    LDLCALC 79 06/17/2025    NONHDLC 103 06/17/2025    [id number] 3.122 06/17/2025    SYPHILISAB Non-reactive 01/20/2025    HGBA1C 4.2 01/20/2025    EAG 74 01/20/2025       Administrative Statements     Counseling / Coordination of Care:   Patient's progress discussed with staff in treatment team meeting.  Medication changes reviewed with staff in treatment team " "meeting.    Portions of the record may have been created with voice recognition software. Occasional wrong word or \"sound a like\" substitutions may have occurred due to the inherent limitations of voice recognition software. Read the chart carefully and recognize, using context, where substitutions have occurred.    ALPHONSO Mix 06/18/25  "

## 2025-06-18 NOTE — PLAN OF CARE
Problem: Risk for Self Injury/Neglect  Goal: Refrain from harming self  Description: Interventions:  - Monitor patient closely, per order  - Develop a trusting relationship  - Supervise medication ingestion, monitor effects and side effects   Outcome: Progressing     Problem: Risk for Violence/Aggression Toward Others  Goal: Control angry outbursts  Description: Interventions:  - Monitor patient closely, per order  - Ensure early verbal de-escalation  - Monitor prn medication needs  - Set reasonable/therapeutic limits, outline behavioral expectations, and consequences   - Provide a non-threatening milieu, utilizing the least restrictive interventions   Outcome: Progressing

## 2025-06-18 NOTE — PLAN OF CARE
Problem: Alteration in Thoughts and Perception  Goal: Attend and participate in unit activities, including therapeutic, recreational, and educational groups  Description: Interventions:  -Encourage Visitation and family involvement in care  Outcome: Not Progressing     Problem: Alteration in Thoughts and Perception  Goal: Attend and participate in unit activities, including therapeutic, recreational, and educational groups  Description: Interventions:  -Encourage Visitation and family involvement in care  Outcome: Not Progressing     Problem: Ineffective Coping  Goal: Participates in unit activities  Description: Interventions:  - Provide therapeutic environment   - Provide required programming   - Redirect inappropriate behaviors   Outcome: Not Progressing     Problem: Risk for Self Injury/Neglect  Goal: Attend and participate in unit activities, including therapeutic, recreational, and educational groups  Description: Interventions:  - Provide therapeutic and educational activities daily, encourage attendance and participation, and document same in the medical record  - Obtain collateral information, encourage visitation and family involvement in care   Outcome: Not Progressing     Problem: Risk for Violence/Aggression Toward Others  Goal: Attend and participate in unit activities, including therapeutic, recreational, and educational groups  Description: Interventions:  - Provide therapeutic and educational activities daily, encourage attendance and participation, and document same in the medical record   Outcome: Not Progressing   His attendance is poor due to being psychotic and unable to participate effectively in groups

## 2025-06-19 PROCEDURE — 99233 SBSQ HOSP IP/OBS HIGH 50: CPT

## 2025-06-19 RX ORDER — OLANZAPINE 10 MG/2ML
2.5 INJECTION, POWDER, FOR SOLUTION INTRAMUSCULAR ONCE
Status: DISCONTINUED | OUTPATIENT
Start: 2025-06-19 | End: 2025-06-19

## 2025-06-19 RX ORDER — RISPERIDONE 1 MG/1
1 TABLET ORAL ONCE
Status: DISCONTINUED | OUTPATIENT
Start: 2025-06-19 | End: 2025-06-19

## 2025-06-19 RX ORDER — OLANZAPINE 10 MG/2ML
7.5 INJECTION, POWDER, FOR SOLUTION INTRAMUSCULAR 2 TIMES DAILY
Status: DISCONTINUED | OUTPATIENT
Start: 2025-06-19 | End: 2025-06-23

## 2025-06-19 RX ADMIN — OLANZAPINE 5 MG: 10 INJECTION, POWDER, FOR SOLUTION INTRAMUSCULAR at 08:46

## 2025-06-19 RX ADMIN — OLANZAPINE 7.5 MG: 10 INJECTION, POWDER, FOR SOLUTION INTRAMUSCULAR at 18:06

## 2025-06-19 NOTE — NURSING NOTE
Patient pleasant, calm and cooperative, able to make needs known. Is visible on the module, social with peers, attends groups. Denies SI/HI/VH, reports AH but does not elaborate, denies anxiety and depression not requesting or requiring PRN intervention. Medication compliant. Support provided.

## 2025-06-19 NOTE — PROGRESS NOTES
Progress Note - Behavioral Health   Name: Jah Morejon Jr. 24 y.o. male I MRN: [id number]  Unit/Bed#: U 224-01 I Date of Admission: 6/14/2025   Date of Service: 6/19/2025 I Hospital Day: 5    Assessment & Plan  Psychosis (HCC)  Continue Medication over objection.   Risperdal increased to 3 mg po BID on 6/19/2025,  linked with Zyprexa IM 7.5 mg BID increased on 6/19/2025    Patient demonstrates  impaired insight into his current psychotic condition.     Current Medications:    Current Facility-Administered Medications:     ergocalciferol (VITAMIN D2) capsule 50,000 Units, Oral, Weekly    melatonin tablet 3 mg, Oral, HS    risperiDONE (RisperDAL M-TAB) disintegrating tablet 3 mg, Oral, BID **OR** OLANZapine (ZyPREXA) IM injection 7.5 mg, Intramuscular, BID    Current Facility-Administered Medications:     acetaminophen (TYLENOL) tablet 650 mg, Oral, Q6H PRN    acetaminophen (TYLENOL) tablet 650 mg, Oral, Q4H PRN    acetaminophen (TYLENOL) tablet 975 mg, Oral, Q6H PRN    aluminum-magnesium hydroxide-simethicone (MAALOX) oral suspension 30 mL, Oral, Q4H PRN    haloperidol lactate (HALDOL) injection 2.5 mg, Intramuscular, Q4H PRN Max 4/day **AND** LORazepam (ATIVAN) injection 1 mg, Intramuscular, Q4H PRN Max 4/day **AND** benztropine (COGENTIN) injection 0.5 mg, Intramuscular, Q4H PRN Max 4/day    haloperidol lactate (HALDOL) injection 5 mg, Intramuscular, Q4H PRN Max 4/day **AND** LORazepam (ATIVAN) injection 2 mg, Intramuscular, Q4H PRN Max 4/day **AND** benztropine (COGENTIN) injection 1 mg, Intramuscular, Q4H PRN Max 4/day    benztropine (COGENTIN) injection 1 mg, Intramuscular, Q4H PRN Max 6/day    benztropine (COGENTIN) tablet 1 mg, Oral, Q4H PRN Max 6/day    bisacodyl (DULCOLAX) rectal suppository 10 mg, Rectal, Daily PRN    hydrOXYzine HCL (ATARAX) tablet 50 mg, Oral, Q6H PRN Max 4/day **OR** diphenhydrAMINE (BENADRYL) injection 50 mg, Intramuscular, Q6H PRN    haloperidol (HALDOL) tablet 1 mg, Oral, Q6H  PRN    haloperidol (HALDOL) tablet 2.5 mg, Oral, Q4H PRN Max 4/day    haloperidol (HALDOL) tablet 5 mg, Oral, Q4H PRN Max 4/day    hydrOXYzine HCL (ATARAX) tablet 100 mg, Oral, Q6H PRN Max 4/day **OR** LORazepam (ATIVAN) injection 2 mg, Intramuscular, Q6H PRN    hydrOXYzine HCL (ATARAX) tablet 25 mg, Oral, Q6H PRN Max 4/day    polyethylene glycol (MIRALAX) packet 17 g, Oral, Daily PRN    propranolol (INDERAL) tablet 10 mg, Oral, Q8H PRN    senna-docusate sodium (SENOKOT S) 8.6-50 mg per tablet 1 tablet, Oral, Daily PRN    traZODone (DESYREL) tablet 50 mg, Oral, HS PRN    Risks/Benefits of Treatment:     Risks, benefits, and possible side effects of medications explained to patient and patient verbalizes understanding and agreement for treatment.    Treatment Planning:     All current active medications have been reviewed.  Continue to monitor response to treatment and assess for potential side effects of medications.  Encourage group therapy, milieu therapy and occupational therapy.  Collaboration with medical service for medical comorbidities as indicated.  Behavioral Health checks for safety monitoring.  Estimated Discharge Day: 6/21/2025 7 days (6/26/2025)  Legal Status : On 303 commitment.  Long Stay Certification : Not Applicable    Subjective     Behavior over the last 24 hours: rajani Tyson was seen today for psychiatric follow-up. Patient is withdrawn, isolative to self, laying in bed. He remains religiously preoccupied, asked writer if he believes there is a God. He is guarded. Endorsed AH of voices telling him negative things and depression from being on the unit. Patient is paranoid and suspicious. His affect is blunted; with poor insight and judgement. He denied any SI/HI when asked.    Sleep: normal  Appetite: normal  Medication side effects: No  ROS: review of systems as noted above in HPI/Subjective report, all other systems are negative    Objective :  Temp:  [97.2 °F (36.2 °C)-97.7 °F (36.5  "°C)] 97.2 °F (36.2 °C)  HR:  [] 70  BP: (131-135)/(70) 135/70  Resp:  [18-19] 19  SpO2:  [97 %-98 %] 97 %  O2 Device: None (Room air)    Mental Status Evaluation:    Appearance:  Age appropriate, disheveled   Behavior:  withdrawn, calm   Speech:  normal rate, normal volume, scant   Mood:  \"depressed\"   Affect:  blunted   Thought Process:  poverty of thought   Thought Content:  Sikhism preoccupation, paranoid ideation   Perceptual Disturbances: Appears internally preoccupied   Risk Potential: Suicidal Ideation -  None at present  Homicidal Ideation -  None at present  Potential for Aggression - No   Sensorium:  oriented to person, place, and time/date   Memory:  recent and remote memory grossly intact   Consciousness:  alert and awake   Attention/Concentration: decreased attention span and decreased concentration   Insight:  impaired   Judgment: impaired   Gait/Station: In bed   Motor Activity: no abnormal movements       Lab Results: I have reviewed the following results:  Most Recent Labs:   Lab Results   Component Value Date    WBC 6.80 06/17/2025    RBC 4.89 06/17/2025    HGB 15.1 06/17/2025    HCT 46.7 06/17/2025     06/17/2025    RDW 10.5 (L) 06/17/2025    NEUTROABS 3.93 06/17/2025    SODIUM 140 06/17/2025    K 3.7 06/17/2025     06/17/2025    CO2 27 06/17/2025    BUN 13 06/17/2025    CREATININE 1.00 06/17/2025    GLUC 95 06/17/2025    CALCIUM 9.2 06/17/2025    AST 26 06/17/2025    ALT 11 06/17/2025    ALKPHOS 40 06/17/2025    TP 7.4 06/17/2025    ALB 4.6 06/17/2025    TBILI 0.63 06/17/2025    CHOLESTEROL 148 06/17/2025    HDL 45 06/17/2025    TRIG 120 06/17/2025    LDLCALC 79 06/17/2025    NONHDLC 103 06/17/2025    [id number] 3.122 06/17/2025    SYPHILISAB Non-reactive 01/20/2025    TREPONEMAPA Non-reactive 06/17/2025    HGBA1C 4.2 01/20/2025    EAG 74 01/20/2025       Administrative Statements     Counseling / Coordination of Care:   Patient's progress discussed with staff in treatment " "team meeting.  Medication changes reviewed with staff in treatment team meeting.    Portions of the record may have been created with voice recognition software. Occasional wrong word or \"sound a like\" substitutions may have occurred due to the inherent limitations of voice recognition software. Read the chart carefully and recognize, using context, where substitutions have occurred.    ALPHONSO Mix 06/19/25  "

## 2025-06-19 NOTE — PROGRESS NOTES
06/19/25 1050   Team Meeting   Meeting Type Daily Rounds   Team Members Present   Team Members Present Physician;Nurse;;   Physician Team Member Iesha/Juventino   Nursing Team Member Tyesha   Care Management Team Member Chayo Azevedo   Social Work Team Member    Patient/Family Present   Patient Present No   Patient's Family Present No     Pt is a 302. Pt remains internally preoccupied.  Pt denies all. Pt remains withdrawn to self. Pt is noncompliant with medications. Pt is on Meds over objections. Patient discharge is pending stabilization of mood and medications. \

## 2025-06-19 NOTE — PLAN OF CARE
Problem: Depression  Goal: Verbalize thoughts and feelings  Description: Interventions:  - Assess and re-assess patient's level of risk   - Engage patient in 1:1 interactions, daily, for a minimum of 15 minutes   - Encourage patient to express feelings, fears, frustrations, hopes   Outcome: Not Progressing     Problem: Ineffective Coping  Goal: Identifies healthy coping skills  Outcome: Not Progressing

## 2025-06-19 NOTE — ASSESSMENT & PLAN NOTE
Continue Medication over objection.   Risperdal increased to 3 mg po BID on 6/19/2025,  linked with Zyprexa IM 7.5 mg BID increased on 6/19/2025    Patient demonstrates  impaired insight into his current psychotic condition.

## 2025-06-19 NOTE — NUTRITION
06/19/25 1117   Biochemical Data,Medical Tests, and Procedures   Biochemical Data/Medical Tests/Procedures Lab values reviewed;Meds reviewed   Labs (Comment) 6/17 CMP WNL, lipids WNL, Vit D:13.7   Meds (Comment) cogentin, Vit D2, haldol, atarax, ativan, melatonin, zyprexa, inderal, desyrel   Nutrition-Focused Physical Exam   Nutrition-Focused Physical Exam Findings RN skin assessment reviewed;No skin issues documented   Medical-Related Concerns Concussion 03/24/2015    Depression 02/23/2017    Fatigue 02/23/2017    GERD (gastroesophageal reflux disease)  as a child   Gynecomastia 11/10/2017    Hiatal hernia   Adequacy of Intake   Nutrition Modality PO   Feeding Route   PO Independent   Current PO Intake   Current Diet Order Regular diet thin liquids   Current Meal Intake %   Estimated calorie intake compared to estimated need Nutrient needs met.   PES Statement   Problem No nutrition diagnosis   Recommendations/Interventions   Malnutrition/BMI Present No  (does not meet criteria)   Summary Length of stay. Regular diet thin liquids. Meal completions %. Patient oriented to person, minimal in conversation. Withdrawn to room, religiously preoccupied, paranoid, bizarre per notes. Unable to obtain any meaningful nutrition history at this time. Chart utilized for information.  6/14/#; 2/5/#; 1/26/#. No significant weight changes. Skin intact.   Interventions/Recommendations Continue current diet order   Education Assessment   Education Education not indicated at this time   Nutrition Complexity Risk   Nutrition complexity level Low risk   Follow up date 07/03/25

## 2025-06-19 NOTE — PROGRESS NOTES
"Progress Note - Jah Morejon Jr. 24 y.o. male MRN: [id number]    Unit/Bed#: [facility] 224-01 Encounter: [id number]        Subjective:   Patient seen and examined at bedside after reviewing the chart and discussing the case with the caring staff.      Patient examined at bedside.  Patient denies any acute symptoms.     Physical Exam   Vitals: Blood pressure 135/70, pulse 70, temperature (!) 97.2 °F (36.2 °C), temperature source Temporal, resp. rate 19, height 5' 9\" (1.753 m), weight 67.1 kg (148 lb), SpO2 97%.,Body mass index is 21.86 kg/m².  Constitutional: Patient in no acute distress.  HEENT: PERR, EOMI, MMM.  Cardiovascular: Normal rate and regular rhythm.    Pulmonary/Chest: Effort normal and breath sounds normal.   Abdomen: Soft, + BS, NT.    Assessment/Plan:  Jah Morejon Jr. is a(n) 24 y.o. male with MDD.     Arthralgias/HA.  Tylenol as needed.   Insomnia.  Patient on melatonin 3 mg at bedtime.   Vitamin D deficiency.  Level 13.7 on 6/17.  Start Vit D2 50,000 units weekly x 10 weeks followed by Vit D3 1,000 units daily.       The patient was discussed with Dr. Zambrano and he is in agreement with the above note.  "

## 2025-06-20 PROCEDURE — 99232 SBSQ HOSP IP/OBS MODERATE 35: CPT | Performed by: NURSE PRACTITIONER

## 2025-06-20 RX ADMIN — RISPERIDONE 3 MG: 2 TABLET, ORALLY DISINTEGRATING ORAL at 18:08

## 2025-06-20 RX ADMIN — OLANZAPINE 7.5 MG: 10 INJECTION, POWDER, FOR SOLUTION INTRAMUSCULAR at 08:06

## 2025-06-20 NOTE — PLAN OF CARE
Problem: Alteration in Thoughts and Perception  Goal: Complete daily ADLs, including personal hygiene independently, as able  Description: Interventions:  - Observe, teach, and assist patient with ADLS  - Monitor and promote a balance of rest/activity, with adequate nutrition and elimination   Outcome: Progressing     Problem: Ineffective Coping  Goal: Cooperates with admission process  Description: Interventions:   - Complete admission process  Outcome: Completed

## 2025-06-20 NOTE — PROGRESS NOTES
06/20/25 0949   Team Meeting   Meeting Type Daily Rounds   Team Members Present   Team Members Present Physician;Nurse;;   Physician Team Member Iesha/Juventino   Nursing Team Member Veto   Care Management Team Member Chayo Azevedo   Social Work Team Member    Patient/Family Present   Patient Present No   Patient's Family Present No     Pt is a 302. Pt attending groups. Pt denies SI/HI/VH. Pt reports having AH/ Pt denies anxiety or depression. Pt is medication compliant. Pt is on meds over objection. Pt is calm and cooperative with care. Pt presenting as loud . Patient discharge is pending stabilization of mood and medications.

## 2025-06-20 NOTE — PROGRESS NOTES
"Progress Note - Behavioral Health   Name: Jah Morejon Jr. 24 y.o. male I MRN: [id number]  Unit/Bed#: U 224-01 I Date of Admission: 6/14/2025   Date of Service: 6/20/2025 I Hospital Day: 6        Assessment & Plan  Psychosis (HCC)  Continue Risperdal m- tab 3 mg po BID for psychosis; increased 6/19/2025. May utilize Zyprexa 7.5 mg IM as a part of medication over objection order    Risks / Benefits of Treatment:  Risks, benefits, and possible side effects of medications explained to patient. Patient has limited understanding of risks and benefits of treatment at this time, but agrees to take medications as prescribed.      Progress Toward Goals: Some improvement.  Less bizarre and reports decrease in auditory hallucinations.  Showing some improvement with reality testing.  Receptive to medication education.  Inquiring about discharge.  Plan is to continue with current psychotropic regimen; patient tolerating well.  May continue utilizing medication over objection order should patient refuse Risperdal M tab for treatment of psychosis.      Subjective:    Jah is often visible in the milieu and social with select peers.  Continues to refuse oral medications and receives IM as a part of medication over objection order for treatment of psychosis.  Denies any sleep disturbance.  Reports decrease in auditory hallucinations and describes is not commanding.  Some underlying Anabaptism preoccupation present.  Otherwise appears to be maintaining ADLs.  When assessing why he has been noncompliant with oral medications, patient stated \"I do not like to swallow pills.\"  Receptive to education regarding dissolvable M-Tab and stated \"okay, I will take the pills if it will help me go home earlier.\"  Denies SI/HI.      Behavior over the last 24 hours: slowly improving.   Sleep: improved  Appetite: normal  Medication side effects: No   ROS: no complaints, all other systems are negative    Objective :  Temp:  [97.3 °F (36.3 °C)-97.5 " "°F (36.4 °C)] 97.5 °F (36.4 °C)  HR:  [61-68] 61  BP: (113-121)/(63-67) 121/67  Resp:  [18] 18  SpO2:  [96 %-97 %] 97 %  O2 Device: None (Room air)    Mental Status Evaluation:  Appearance:  Thin  male, blond hair, bearded, casually dressed   Behavior:  Cooperative, less bizarre, redirectable, less guarded   Speech:  Scant, slow   Mood:  \"Tired\"   Affect:  blunted, no longer inappropriate or exhibiting inappropriate smiling/laughing   Thought Process:  Circumstantial   Associations: Circumstantial   Thought Content:  Some Gnosticism preoccupation, less paranoid   Perceptual Disturbances: Reported decrease in auditory hallucinations, no longer commanding, appears less preoccupied   Risk Potential: Suicidal ideation - None  Homicidal ideation - None  Potential for aggression - Not at present   Sensorium:  oriented to person, place, and time/date   Memory:  recent and remote memory grossly intact   Consciousness:  alert and awake   Attention/Concentration: decreased concentration and decreased attention span   Insight:  impaired due to psychosis   Judgment: impaired due to psychosis   Gait/Station: normal gait/station, normal balance   Motor Activity: no abnormal movements       Lab Results: I have reviewed the following results:  CMP:   Lab Results   Component Value Date    SODIUM 140 06/17/2025    K 3.7 06/17/2025     06/17/2025    CO2 27 06/17/2025    AGAP 8 06/17/2025    BUN 13 06/17/2025    CREATININE 1.00 06/17/2025    GLUC 95 06/17/2025    GLUF 89 01/20/2025    CALCIUM 9.2 06/17/2025    AST 26 06/17/2025    ALT 11 06/17/2025    ALKPHOS 40 06/17/2025    TP 7.4 06/17/2025    ALB 4.6 06/17/2025    TBILI 0.63 06/17/2025    EGFR 105 06/17/2025     Drug Screen:   Lab Results   Component Value Date    AMPMETHUR Negative 06/14/2025    BARBTUR Negative 06/14/2025    BDZUR Negative 06/14/2025    THCUR Negative 06/14/2025    COCAINEUR Negative 06/14/2025    METHADONEUR Negative 06/14/2025    OPIATEUR Negative " 06/14/2025    PCPUR Negative 06/14/2025     Counseling / Coordination of Care:    Patient's progress discussed with staff in treatment team meeting.  Patient's progress reviewed with nursing staff.  Medications, treatment progress and treatment plan reviewed with patient.  Medication education provided to patient.  Patient's progress reviewed with case management staff.  Patient's diagnosis and treatment indicated reviewed with patient.  Importance of medication and treatment compliance reviewed with patient.  Educated on importance of medication and treatment compliance.  Discussed with patient acceptance of mental illness diagnosis and need for ongoing treatment after discharge.  Cognitive techniques utilized during the session.  Reassurance and supportive therapy provided.  Reoriented to reality and reassured.  Group attendance encouraged.  Encouraged participation in milieu and group therapy on the unit.      ALPHONSO Granados 06/20/25

## 2025-06-20 NOTE — PROGRESS NOTES
"Progress Note - Jah Morejon Jr. 24 y.o. male MRN: [id number]    Unit/Bed#: [facility] 224-01 Encounter: [id number]        Subjective:   Patient seen and examined at bedside after reviewing the chart and discussing the case with the caring staff.      Patient examined at bedside.  Patient denies any acute symptoms.     Physical Exam   Vitals: Blood pressure 121/67, pulse 61, temperature 97.5 °F (36.4 °C), temperature source Temporal, resp. rate 18, height 5' 9\" (1.753 m), weight 67.1 kg (148 lb), SpO2 97%.,Body mass index is 21.86 kg/m².  Constitutional: Patient in no acute distress.  HEENT: PERR, EOMI, MMM.  Cardiovascular: Normal rate and regular rhythm.    Pulmonary/Chest: Effort normal and breath sounds normal.   Abdomen: Soft, + BS, NT.    Assessment/Plan:  Jah Morejon Jr. is a(n) 24 y.o. male with MDD.     Arthralgias/HA.  Tylenol as needed.   Insomnia.  Patient on melatonin 3 mg at bedtime.   Vitamin D deficiency.  Level 13.7 on 6/17.  Start Vit D2 50,000 units weekly x 10 weeks followed by Vit D3 1,000 units daily.       The patient was discussed with Dr. Zambrano and he is in agreement with the above note.  "

## 2025-06-20 NOTE — NURSING NOTE
Provided medication education. Pt complaint with new orders for Risperdal 3 mg M-tab. Compliant with mouth checks. No IM injection administered.

## 2025-06-20 NOTE — NURSING NOTE
Pt condition unchanged since AM assessment. Continues to deny SI/HI/VH, denies anxiety or depression, support provided.

## 2025-06-20 NOTE — PLAN OF CARE
Problem: Ineffective Coping  Goal: Participates in unit activities  Description: Interventions:  - Provide therapeutic environment   - Provide required programming   - Redirect inappropriate behaviors   Outcome: Not Progressing     Problem: Alteration in Thoughts and Perception  Goal: Attend and participate in unit activities, including therapeutic, recreational, and educational groups  Description: Interventions:  -Encourage Visitation and family involvement in care  Outcome: Not Progressing     Problem: Ineffective Coping  Goal: Participates in unit activities  Description: Interventions:  - Provide therapeutic environment   - Provide required programming   - Redirect inappropriate behaviors   Outcome: Not Progressing     Problem: Risk for Self Injury/Neglect  Goal: Attend and participate in unit activities, including therapeutic, recreational, and educational groups  Description: Interventions:  - Provide therapeutic and educational activities daily, encourage attendance and participation, and document same in the medical record  - Obtain collateral information, encourage visitation and family involvement in care   Outcome: Not Progressing     Problem: Risk for Violence/Aggression Toward Others  Goal: Attend and participate in unit activities, including therapeutic, recreational, and educational groups  Description: Interventions:  - Provide therapeutic and educational activities daily, encourage attendance and participation, and document same in the medical record   Outcome: Not Progressing

## 2025-06-20 NOTE — ASSESSMENT & PLAN NOTE
Continue Risperdal m- tab 3 mg po BID for psychosis; increased 6/19/2025. May utilize Zyprexa 7.5 mg IM as a part of medication over objection order

## 2025-06-21 PROCEDURE — 99233 SBSQ HOSP IP/OBS HIGH 50: CPT | Performed by: HOSPITALIST

## 2025-06-21 RX ADMIN — RISPERIDONE 3 MG: 2 TABLET, ORALLY DISINTEGRATING ORAL at 09:11

## 2025-06-21 RX ADMIN — RISPERIDONE 3 MG: 2 TABLET, ORALLY DISINTEGRATING ORAL at 17:47

## 2025-06-21 NOTE — PLAN OF CARE
Problem: Alteration in Thoughts and Perception  Goal: Recognize dysfunctional thoughts, communicate reality-based thoughts at the time of discharge  Description: Interventions:  - Provide medication and psycho-education to assist patient in compliance and developing insight into his/her illness   Outcome: Not Progressing     Problem: Ineffective Coping  Goal: Demonstrates healthy coping skills  Outcome: Not Progressing

## 2025-06-21 NOTE — PROGRESS NOTES
"Progress Note - Jah Morejon Jr. 24 y.o. male MRN: [id number]    Unit/Bed#: [facility] [email or web address] 224-01 Encounter: [id number]        Subjective:   Patient seen and examined at bedside after reviewing the chart and discussing the case with the caring staff.      Patient examined at bedside.  Patient denies any acute symptoms.     Physical Exam   Vitals: Blood pressure 118/78, pulse 88, temperature 97.5 °F (36.4 °C), temperature source Temporal, resp. rate 18, height 5' 9\" (1.753 m), weight 67.1 kg (148 lb), SpO2 96%.,Body mass index is 21.86 kg/m².  Constitutional: Patient in no acute distress.  HEENT: PERR, EOMI, MMM.  Cardiovascular: Normal rate and regular rhythm.    Pulmonary/Chest: Effort normal and breath sounds normal.   Abdomen: Soft, + BS, NT.    Assessment/Plan:  Jah Morejon Jr. is a(n) 24 y.o. male with MDD.     Arthralgias/HA.  Tylenol as needed.   Insomnia.  Patient on melatonin 3 mg at bedtime.   Vitamin D deficiency.  Level 13.7 on 6/17.  Start Vit D2 50,000 units weekly x 10 weeks followed by Vit D3 1,000 units daily.   "

## 2025-06-21 NOTE — PROGRESS NOTES
"Progress Note - Behavioral Health     Jah Morejon Jr. 24 y.o. male MRN: [id number]   Unit/Bed#: [facility] 224-01 Encounter: [id number]    Assessment & Plan  Psychosis (HCC)  Continue Risperdal m- tab 3 mg po BID for psychosis; increased 6/19/2025. May utilize Zyprexa 7.5 mg IM as a part of medication over objection order       Risks / Benefits of Treatment:    Risks, benefits, and possible side effects of medications explained to patient and patient verbalizes understanding. At this time patient does not want to start medications.  Medication over objection in place  Behavior over the last 24 hours: minimal improvement.     Jah seen today, per staff report has been visible, seen inappropriately smiling to himself on the unit.  On exam patient remains religiously preoccupied, hyperverbal regarding his Scientologist beliefs and proselytizing to this provider.  Remains psychotic.  Requesting discharge as he feels he is \"fine\".  Has poor insight and judgment.  Minimal improvement that he did take p.o. medication in the last 24 hours rather than receiving IM medications over objection.      Mental Status Evaluation:    Appearance:  disheveled, marginal hygiene, looks stated age   Behavior:  calm, bizarre   Speech:  normal rate, normal volume   Mood:  euthymic   Affect:  Slightly blunted   Thought Process:  disorganized, illogical   Associations: circumstantial associations   Thought Content:  Gnosticist delusions   Perceptual Disturbances: denies auditory hallucinations when asked, appears preoccupied   Risk Potential: Suicidal ideation - None  Homicidal ideation - None   Sensorium:  oriented to person, place, and time/date   Memory:  recent and remote memory grossly intact   Consciousness:  alert and awake   Attention: attention span and concentration are age appropriate   Insight:  impaired   Judgment: impaired   Gait/Station: normal gait/station   Motor Activity: no abnormal movements     Vital signs in last 24 " hours:    Temp:  [97.5 °F (36.4 °C)-97.9 °F (36.6 °C)] 97.5 °F (36.4 °C)  HR:  [82-88] 88  BP: (110-118)/(62-78) 118/78  Resp:  [18] 18  SpO2:  [96 %-98 %] 96 %    Laboratory results: I have personally reviewed all pertinent laboratory/tests results.    All current active medications have been reviewed  Encourage group therapy, milieu therapy and occupational therapy  Behavioral Health checks for safety monitoring  Continue treatment with group therapy, milieu therapy and occupational therapy  Current Facility-Administered Medications   Medication Dose Route Frequency Provider Last Rate    acetaminophen  650 mg Oral Q6H PRN Jasmina Douglas MD      acetaminophen  650 mg Oral Q4H PRN Jasmina Douglas MD      acetaminophen  975 mg Oral Q6H PRN Jasmina Douglas MD      aluminum-magnesium hydroxide-simethicone  30 mL Oral Q4H PRN Jasmina Douglas MD      haloperidol lactate  2.5 mg Intramuscular Q4H PRN Max 4/day Jasmina Douglas MD      And    LORazepam  1 mg Intramuscular Q4H PRN Max 4/day Jasmina Douglas MD      And    benztropine  0.5 mg Intramuscular Q4H PRN Max 4/day Jasmina Douglas MD      haloperidol lactate  5 mg Intramuscular Q4H PRN Max 4/day Jasmina Douglas MD      And    LORazepam  2 mg Intramuscular Q4H PRN Max 4/day Jasmina Douglas MD      And    benztropine  1 mg Intramuscular Q4H PRN Max 4/day Jasmina Douglas MD      benztropine  1 mg Intramuscular Q4H PRN Max 6/day Jasmina Douglas MD      benztropine  1 mg Oral Q4H PRN Max 6/day Jasmina Douglas MD      bisacodyl  10 mg Rectal Daily PRN Jasmina Douglas MD      hydrOXYzine HCL  50 mg Oral Q6H PRN Max 4/day Jasmina Douglas MD      Or    diphenhydrAMINE  50 mg Intramuscular Q6H PRN Jasmina Douglas MD      ergocalciferol  50,000 Units Oral Weekly Sendy Moctezuma PA-C      haloperidol  1 mg Oral Q6H PRN Jasmina Douglas MD      haloperidol  2.5 mg Oral Q4H PRN Max 4/day Jasmina Douglas MD      haloperidol  5 mg Oral Q4H  PRN Max 4/day Jasmina Douglas MD      hydrOXYzine HCL  100 mg Oral Q6H PRN Max 4/day Jasmina Douglas MD      Or    LORazepam  2 mg Intramuscular Q6H PRN Jasmina Douglas MD      hydrOXYzine HCL  25 mg Oral Q6H PRN Max 4/day Jasmina Douglas MD      melatonin  3 mg Oral HS Jasmina Douglas MD      risperiDONE  3 mg Oral BID Prakash Elizabeth MD      Or    OLANZapine  7.5 mg Intramuscular BID Prakash Elizabeth MD      polyethylene glycol  17 g Oral Daily PRN Jasmina Douglas MD      propranolol  10 mg Oral Q8H PRN Jasmina Douglas MD      senna-docusate sodium  1 tablet Oral Daily PRN Jasmina Douglas MD      traZODone  50 mg Oral HS PRN Jasmina Douglas MD         Counseling / Coordination of Care:    Medication changes reviewed with nursing staff.  Patient's progress reviewed with nursing staff.  Medication education provided to patient.  Supportive therapy provided to patient.    Siobhan Beltran MD 06/21/25

## 2025-06-21 NOTE — NURSING NOTE
Pt calm and cooperative with assessment. Denies SI/HI/AH/VH and pain. Compliant with meds. Withdrawn to room, out to let needs be known. Safety checks ongoing.

## 2025-06-22 PROCEDURE — 99233 SBSQ HOSP IP/OBS HIGH 50: CPT | Performed by: HOSPITALIST

## 2025-06-22 RX ADMIN — RISPERIDONE 3 MG: 2 TABLET, ORALLY DISINTEGRATING ORAL at 18:05

## 2025-06-22 RX ADMIN — RISPERIDONE 3 MG: 2 TABLET, ORALLY DISINTEGRATING ORAL at 09:39

## 2025-06-22 NOTE — NURSING NOTE
Pt visible on unit. Inappropriate smile at times. Pleasant. No irritability or agitation. Religiously preoccupied. Able to redirect easier. Compliant with prescribed risperidone. No PRN medications administered this shift. Able to communicate needs. Attending select groups. Safety precautions maintained. Will continue to monitor and assess.

## 2025-06-22 NOTE — PROGRESS NOTES
"Progress Note - Jah Morejon Jr. 24 y.o. male MRN: [id number]    Unit/Bed#: [facility] 224-01 Encounter: [id number]        Subjective:   Patient seen and examined at bedside after reviewing the chart and discussing the case with the caring staff.      Patient examined at bedside.  Patient denies any acute symptoms.     Physical Exam   Vitals: Blood pressure 113/68, pulse 101, temperature 97.8 °F (36.6 °C), temperature source Temporal, resp. rate 18, height 5' 9\" (1.753 m), weight 70.1 kg (154 lb 9.6 oz), SpO2 96%.,Body mass index is 22.83 kg/m².  Constitutional: Patient in no acute distress.  HEENT: PERR, EOMI, MMM.  Cardiovascular: Normal rate and regular rhythm.    Pulmonary/Chest: Effort normal and breath sounds normal.   Abdomen: Soft, + BS, NT.    Assessment/Plan:  Jah Morejon Jr. is a(n) 24 y.o. male with MDD.     Arthralgias/HA.  Tylenol as needed.   Insomnia.  Patient on melatonin 3 mg at bedtime.   Vitamin D deficiency.  Level 13.7 on 6/17.  Start Vit D2 50,000 units weekly x 10 weeks followed by Vit D3 1,000 units daily.   "

## 2025-06-22 NOTE — NURSING NOTE
Patient behaviors controlled. Calm and cooperative. Refused HS dose of melatonin. Denies any SI/HI,AV/H, anxiety or depression. Q 15 minute monitoring maintained.

## 2025-06-22 NOTE — PROGRESS NOTES
"Progress Note - Behavioral Health     Jah Morejon Jr. 24 y.o. male MRN: [id number]   Unit/Bed#: [facility] 224-01 Encounter: [id number]    Assessment & Plan  Psychosis (HCC)  Continue Risperdal m- tab 3 mg po BID for psychosis; increased 6/19/2025. May utilize Zyprexa 7.5 mg IM as a part of medication over objection order       Risks / Benefits of Treatment:    Risks, benefits, and possible side effects of medications explained to patient and patient verbalizes understanding and agreement for treatment.  Behavior over the last 24 hours: unchanged.     Jah seen today, per staff report has been visible, cooperative on the unit.  On exam patient remains psychotic.  Denies auditory hallucinations however appears internally preoccupied.  Continues to have religiosity and states has recurrent thoughts of \"spiritual warfare\".  Poor insight and judgment          Mental Status Evaluation:    Appearance:  disheveled, marginal hygiene, looks stated age   Behavior:  calm, guarded   Speech:  slow, scant   Mood:  euthymic   Affect:  blunted   Thought Process:  illogical   Associations: circumstantial associations   Thought Content:  bizarre delusions   Perceptual Disturbances: denies auditory hallucinations when asked, appears preoccupied   Risk Potential: Suicidal ideation - None  Homicidal ideation - None   Sensorium:  oriented to person, place, and time/date   Memory:  recent and remote memory grossly intact   Consciousness:  alert and awake   Attention: decreased concentration and decreased attention span   Insight:  impaired   Judgment: impaired   Gait/Station: normal gait/station   Motor Activity: no abnormal movements     Vital signs in last 24 hours:    Temp:  [97.5 °F (36.4 °C)-97.8 °F (36.6 °C)] 97.5 °F (36.4 °C)  HR:  [77-91] 77  BP: (115-120)/(68-70) 120/70  Resp:  [18] 18  SpO2:  [93 %-99 %] 99 %    Laboratory results: I have personally reviewed all pertinent laboratory/tests results.    All current active medications " have been reviewed  Encourage group therapy, milieu therapy and occupational therapy  Behavioral Health checks for safety monitoring  Continue treatment with group therapy, milieu therapy and occupational therapy  Current Facility-Administered Medications   Medication Dose Route Frequency Provider Last Rate    acetaminophen  650 mg Oral Q6H PRN Jasmina Douglas MD      acetaminophen  650 mg Oral Q4H PRN Jasmina Douglas MD      acetaminophen  975 mg Oral Q6H PRN Jasmina Douglas MD      aluminum-magnesium hydroxide-simethicone  30 mL Oral Q4H PRN Jasmina Douglas MD      haloperidol lactate  2.5 mg Intramuscular Q4H PRN Max 4/day Jasmina Douglas MD      And    LORazepam  1 mg Intramuscular Q4H PRN Max 4/day Jasmina Douglas MD      And    benztropine  0.5 mg Intramuscular Q4H PRN Max 4/day Jasmina Douglas MD      haloperidol lactate  5 mg Intramuscular Q4H PRN Max 4/day Jasmina Douglas MD      And    LORazepam  2 mg Intramuscular Q4H PRN Max 4/day Jasmina Douglas MD      And    benztropine  1 mg Intramuscular Q4H PRN Max 4/day Jasmina Douglas MD      benztropine  1 mg Intramuscular Q4H PRN Max 6/day Jasmina Douglas MD      benztropine  1 mg Oral Q4H PRN Max 6/day Jasmina Douglas MD      bisacodyl  10 mg Rectal Daily PRN Jasmina Douglas MD      hydrOXYzine HCL  50 mg Oral Q6H PRN Max 4/day Jasmina Douglas MD      Or    diphenhydrAMINE  50 mg Intramuscular Q6H PRN Jasmina Douglas MD      ergocalciferol  50,000 Units Oral Weekly Sendy Moctezuma PA-C      haloperidol  1 mg Oral Q6H PRN Jasmina Douglas MD      haloperidol  2.5 mg Oral Q4H PRN Max 4/day Jasmina Douglas MD      haloperidol  5 mg Oral Q4H PRN Max 4/day Jasmina Douglas MD      hydrOXYzine HCL  100 mg Oral Q6H PRN Max 4/day Jasmina Douglas MD      Or    LORazepam  2 mg Intramuscular Q6H PRN Jasmina Douglas MD      hydrOXYzine HCL  25 mg Oral Q6H PRN Max 4/day Jasmina Douglas MD      melatonin  3 mg Oral HS Jasmina MCNEAL  MD Stella      risperiDONE  3 mg Oral BID Prakash Elizabeth MD      Or    OLANZapine  7.5 mg Intramuscular BID Prakash Elizabeth MD      polyethylene glycol  17 g Oral Daily PRN Jasmina Douglas MD      propranolol  10 mg Oral Q8H PRN Jasmina Douglas MD      senna-docusate sodium  1 tablet Oral Daily PRN Jasmina Douglas MD      traZODone  50 mg Oral HS PRN Jasmina Douglas MD         Counseling / Coordination of Care:    Medication changes reviewed with nursing staff.  Patient's progress reviewed with nursing staff.  Medication education provided to patient.  Supportive therapy provided to patient.    Siobhan Beltran MD 06/22/25

## 2025-06-22 NOTE — PLAN OF CARE
Problem: Alteration in Thoughts and Perception  Goal: Verbalize thoughts and feelings  Description: Interventions:  - Promote a nonjudgmental and trusting relationship with the patient through active listening and therapeutic communication  - Assess patient's level of functioning, behavior and potential for risk  - Engage patient in 1 on 1 interactions  - Encourage patient to express fears, feelings, frustrations, and discuss symptoms    - Julian patient to reality, help patient recognize reality-based thinking   - Administer medications as ordered and assess for potential side effects  - Provide the patient education related to the signs and symptoms of the illness and desired effects of prescribed medications  Outcome: Progressing  Goal: Agree to be compliant with medication regime, as prescribed and report medication side effects  Description: Interventions:  - Offer appropriate PRN medication and supervise ingestion; conduct AIMS, as needed   Outcome: Progressing

## 2025-06-23 PROCEDURE — 99232 SBSQ HOSP IP/OBS MODERATE 35: CPT | Performed by: NURSE PRACTITIONER

## 2025-06-23 RX ADMIN — RISPERIDONE 3 MG: 2 TABLET, ORALLY DISINTEGRATING ORAL at 08:16

## 2025-06-23 RX ADMIN — RISPERIDONE 3 MG: 2 TABLET, ORALLY DISINTEGRATING ORAL at 17:18

## 2025-06-23 NOTE — PROGRESS NOTES
"Progress Note - Jah Morejon Jr. 24 y.o. male MRN: [id number]    Unit/Bed#: [facility] 224-01 Encounter: [id number]        Subjective:   Patient seen and examined at bedside after reviewing the chart and discussing the case with the caring staff.      Patient examined at bedside.  Patient denies any acute symptoms.     Physical Exam   Vitals: Blood pressure 116/67, pulse 89, temperature 97.6 °F (36.4 °C), temperature source Temporal, resp. rate 16, height 5' 9\" (1.753 m), weight 70.1 kg (154 lb 9.6 oz), SpO2 98%.,Body mass index is 22.83 kg/m².  Constitutional: Patient in no acute distress.  HEENT: PERR, EOMI, MMM.  Cardiovascular: Normal rate and regular rhythm.    Pulmonary/Chest: Effort normal and breath sounds normal.   Abdomen: Soft, + BS, NT.    Assessment/Plan:  Jah Morejon Jr. is a(n) 24 y.o. male with MDD.     Arthralgias/HA.  Tylenol as needed.   Insomnia.  Patient on melatonin 3 mg at bedtime.   Vitamin D deficiency.  Level 13.7 on 6/17.  Start Vit D2 50,000 units weekly x 10 weeks followed by Vit D3 1,000 units daily.     The patient was discussed with Dr. Zambrano and he is in agreement with the above note.    "

## 2025-06-23 NOTE — NURSING NOTE
"Patient was mostly withdrawn to himself and his room tonight although he did attend staff. Constricted in affect. No inappropriate smiling observed. He was very friendly and linear in conversation tonight. He reports he was significantly struggling with \"intrusive thoughts\" prior to admission however states \"they're gone now\". He denies any adverse effects of medication. Reports somewhat broken sleep. Denies changes in appetite. No religous discussion made during discussion. He declined melatonin when asked. No other needs identified at this time. Q15 minute checks ongoing.   "

## 2025-06-23 NOTE — PLAN OF CARE
Problem: Alteration in Thoughts and Perception  Goal: Verbalize thoughts and feelings  Description: Interventions:  - Promote a nonjudgmental and trusting relationship with the patient through active listening and therapeutic communication  - Assess patient's level of functioning, behavior and potential for risk  - Engage patient in 1 on 1 interactions  - Encourage patient to express fears, feelings, frustrations, and discuss symptoms    - [place] patient to reality, help patient recognize reality-based thinking   - Administer medications as ordered and assess for potential side effects  - Provide the patient education related to the signs and symptoms of the illness and desired effects of prescribed medications  Outcome: Progressing  Goal: Refrain from acting on delusional thinking/internal stimuli  Description: Interventions:  - Monitor patient closely, per order   - Utilize least restrictive measures   - Set reasonable limits, give positive feedback for acceptable   - Administer medications as ordered and monitor of potential side effects  Outcome: Progressing  Goal: Agree to be compliant with medication regime, as prescribed and report medication side effects  Description: Interventions:  - Offer appropriate PRN medication and supervise ingestion; conduct AIMS, as needed   Outcome: Progressing

## 2025-06-23 NOTE — NURSING NOTE
"Patient visible on the unit, social with select peers. Minimal in conversation. Continues to be religiously preoccupied at times, asking others if they \"believe in good\" also praying with peers. Denies SI/HI/AVH. Taking medications as ordered. Attending groups. Q 15 maintained.   "

## 2025-06-23 NOTE — PROGRESS NOTES
"Progress Note - Behavioral Health   Name: Jah Morejon Jr. 24 y.o. male I MRN: [id number]  Unit/Bed#: U 249-01 I Date of Admission: 6/14/2025   Date of Service: 6/23/2025 I Hospital Day: 9        Assessment & Plan  Psychosis (HCC)  Continue Risperdal m- tab 3 mg po BID for psychosis; increased 6/19/2025. May utilize Zyprexa 7.5 mg IM as a part of medication over objection order    Risks / Benefits of Treatment:  Risks, benefits, and possible side effects of medications explained to patient. Patient has limited understanding of risks and benefits of treatment at this time, but agrees to take medications as prescribed.      Progress Toward Goals: Slowly improving.  No longer exhibiting agitated or aggressive outbursts.  AH resolved.  Remains religiously preoccupied with bizarre delusions.  Compliant with oral medications.  Will continue with current psychotropic regimen; patient tolerating well.  Collateral from family would be helpful to determine patient's baseline and progress over the phone.  No discharge date this time.      Subjective:    Jah is withdrawn and states he would rather spend time in his room than come out to group.  He reads the Bible and states he has been very Sikhism since he had a \"life changing experience\" from watching a healer on a YouTube video.  States he felt Tra \"enter my soul.\"  He asked this provider if they ever heard of \"Sikhism warfare\" or \"a firey dart.\" Believes he was under the negative influence of dark torres prior to admission; \"the voices were mocking me, but it was kind of funny.\"  Appears less preoccupied and reports resolution of AH.  States he has been speaking with his family \"here and there;\" encouraged to call family today.  States his family is also Sikhism, \"but I am a lot more.\"  Exhibits limited insight and judgment.  Describes himself as \"fine\" and ready for discharge.      Behavior over the last 24 hours: slowly improving.   Sleep: normal  Appetite: " "normal  Medication side effects: No   ROS: no complaints, all other systems are negative    Objective :  Temp:  [97.6 °F (36.4 °C)-97.8 °F (36.6 °C)] 97.6 °F (36.4 °C)  HR:  [] 89  BP: (113-116)/(67-68) 116/67  Resp:  [16-18] 16  SpO2:  [96 %-98 %] 98 %  O2 Device: None (Room air)    Mental Status Evaluation:  Appearance:  Thin  male, blond hair, bearded, casually dressed   Behavior:  Cooperative, less bizarre, redirectable, less guarded, no inappropriate behaviors   Speech:  Normal rate/volume   Mood:  \"Fine\"   Affect:  Constricted   Thought Process:  Circumstantial   Associations: Circumstantial   Thought Content:  Religiously preoccupied, bizarre delusions   Perceptual Disturbances: Denied AVH, did not appear internally preoccupied   Risk Potential: Suicidal ideation - None  Homicidal ideation - None  Potential for aggression - Not at present   Sensorium:  oriented to person, place, and time/date   Memory:  recent and remote memory grossly intact   Consciousness:  alert and awake   Attention/Concentration: attention span and concentration appear shorter than expected for age   Insight:  limited   Judgment: limited   Gait/Station: normal gait/station, normal balance   Motor Activity: no abnormal movements       Lab Results: I have reviewed the following results:  CMP:   Lab Results   Component Value Date    SODIUM 140 06/17/2025    K 3.7 06/17/2025     06/17/2025    CO2 27 06/17/2025    AGAP 8 06/17/2025    BUN 13 06/17/2025    CREATININE 1.00 06/17/2025    GLUC 95 06/17/2025    GLUF 89 01/20/2025    CALCIUM 9.2 06/17/2025    AST 26 06/17/2025    ALT 11 06/17/2025    ALKPHOS 40 06/17/2025    TP 7.4 06/17/2025    ALB 4.6 06/17/2025    TBILI 0.63 06/17/2025    EGFR 105 06/17/2025     Drug Screen:   Lab Results   Component Value Date    AMPMETHUR Negative 06/14/2025    BARBTUR Negative 06/14/2025    BDZUR Negative 06/14/2025    THCUR Negative 06/14/2025    COCAINEUR Negative 06/14/2025    " METHADONEUR Negative 06/14/2025    OPIATEUR Negative 06/14/2025    PCPUR Negative 06/14/2025     Counseling / Coordination of Care:    Patient's progress discussed with staff in treatment team meeting.  Patient's progress reviewed with nursing staff.  Medications, treatment progress and treatment plan reviewed with patient.  Medication education provided to patient.  Patient's progress reviewed with case management staff.  Patient's diagnosis and treatment indicated reviewed with patient.  Importance of medication and treatment compliance reviewed with patient.  Educated on importance of medication and treatment compliance.  Discussed with patient acceptance of mental illness diagnosis and need for ongoing treatment after discharge.  Cognitive techniques utilized during the session.  Reassurance and supportive therapy provided.  Reoriented to reality and reassured.  Group attendance encouraged.  Encouraged participation in milieu and group therapy on the unit.      ALPHONSO Granados 06/23/25

## 2025-06-23 NOTE — PLAN OF CARE
Problem: Alteration in Thoughts and Perception  Goal: Attend and participate in unit activities, including therapeutic, recreational, and educational groups  Description: Interventions:  -Encourage Visitation and family involvement in care  Outcome: Progressing     Problem: Ineffective Coping  Goal: Participates in unit activities  Description: Interventions:  - Provide therapeutic environment   - Provide required programming   - Redirect inappropriate behaviors   Outcome: Progressing     Problem: Alteration in Thoughts and Perception  Goal: Attend and participate in unit activities, including therapeutic, recreational, and educational groups  Description: Interventions:  -Encourage Visitation and family involvement in care  Outcome: Progressing     Problem: Risk for Self Injury/Neglect  Goal: Attend and participate in unit activities, including therapeutic, recreational, and educational groups  Description: Interventions:  - Provide therapeutic and educational activities daily, encourage attendance and participation, and document same in the medical record  - Obtain collateral information, encourage visitation and family involvement in care   Outcome: Progressing     Problem: Risk for Violence/Aggression Toward Others  Goal: Attend and participate in unit activities, including therapeutic, recreational, and educational groups  Description: Interventions:  - Provide therapeutic and educational activities daily, encourage attendance and participation, and document same in the medical record   Outcome: Progressing   Pt had increased group attendance over the weekend

## 2025-06-24 PROCEDURE — 99232 SBSQ HOSP IP/OBS MODERATE 35: CPT | Performed by: PSYCHIATRY & NEUROLOGY

## 2025-06-24 RX ADMIN — RISPERIDONE 3 MG: 2 TABLET, ORALLY DISINTEGRATING ORAL at 08:24

## 2025-06-24 RX ADMIN — RISPERIDONE 3 MG: 2 TABLET, ORALLY DISINTEGRATING ORAL at 17:01

## 2025-06-24 NOTE — NURSING NOTE
Patient visible on the unit, social with select peers. Pleasant & cooperative. Patient denies SI/HI/AVH. No overt Zoroastrianism delusions noted. Taking medications as ordered. Q 15 maintained.

## 2025-06-24 NOTE — PROGRESS NOTES
"Progress Note - Behavioral Health   Name: Jah Morejon Jr. 24 y.o. male I MRN: [id number]  Unit/Bed#: U 249-01 I Date of Admission: 6/14/2025   Date of Service: 6/24/2025 I Hospital Day: 10        Assessment & Plan  Psychosis (HCC)  Continue Risperdal m- tab 3 mg po BID for psychosis; increased 6/19/2025    Risks / Benefits of Treatment:  Risks, benefits, and possible side effects of medications explained to patient. Patient has limited understanding of risks and benefits of treatment at this time, but agrees to take medications as prescribed.      Progress Toward Goals: Slowly improving.  Has had no agitated or aggressive outbursts.  Reports resolution of auditory hallucinations.  Taoism preoccupation persists.  Compliant with oral medications.  Patient signed a 72-hour notice 6/23/2025 at 1448.  Continue with psychotropic regimen.  Will require collateral from patient's family regarding baseline and progress over the phone.      Subjective:    Jah was seen today with attending psychiatrist.  He has been less withdrawn and more visible in the milieu; selectively social with peers.  Nursing reports patient has been touching and \"preaching\" to other peers.  Patient reports he asked a peer if it was okay that he pray for her and she consented.  Appeared receptive to education regarding boundaries.  Denies any return of auditory hallucinations.  No longer smiling/laughing inappropriately.  Believes he is \"doing 100 times better\" than when he came in.  No longer exhibiting agitated or aggressive outbursts.  Exhibits limited insight and judgment.  Has been compliant with oral medications.      Behavior over the last 24 hours: slowly improving.   Sleep: normal  Appetite: normal  Medication side effects: No   ROS: no complaints, all other systems are negative    Objective :  Temp:  [97.6 °F (36.4 °C)] 97.6 °F (36.4 °C)  HR:  [82-91] 82  BP: (114-119)/(61-75) 119/61  Resp:  [18] 18  SpO2:  [95 %-96 %] 95 %  O2 " "Device: None (Room air)    Mental Status Evaluation:  Appearance:  Thin  male, blond hair, bearded, casually dressed   Behavior:  Cooperative, less bizarre, redirectable, no inappropriate behaviors   Speech:  Normal rate/volume   Mood:  \"100x better\"   Affect:  Constricted   Thought Process:  Circumstantial   Associations: Circumstantial   Thought Content:  Religiously preoccupied, bizarre delusions   Perceptual Disturbances: Denied AVH, did not appear internally preoccupied   Risk Potential: Suicidal ideation - None  Homicidal ideation - None  Potential for aggression - Not at present   Sensorium:  oriented to person, place, and time/date   Memory:  recent and remote memory grossly intact   Consciousness:  alert and awake   Attention/Concentration: attention span and concentration appear shorter than expected for age   Insight:  limited   Judgment: limited   Gait/Station: normal gait/station, normal balance   Motor Activity: no abnormal movements       Lab Results: I have reviewed the following results:  CMP:   Lab Results   Component Value Date    SODIUM 140 06/17/2025    K 3.7 06/17/2025     06/17/2025    CO2 27 06/17/2025    AGAP 8 06/17/2025    BUN 13 06/17/2025    CREATININE 1.00 06/17/2025    GLUC 95 06/17/2025    GLUF 89 01/20/2025    CALCIUM 9.2 06/17/2025    AST 26 06/17/2025    ALT 11 06/17/2025    ALKPHOS 40 06/17/2025    TP 7.4 06/17/2025    ALB 4.6 06/17/2025    TBILI 0.63 06/17/2025    EGFR 105 06/17/2025     Drug Screen:   Lab Results   Component Value Date    AMPMETHUR Negative 06/14/2025    BARBTUR Negative 06/14/2025    BDZUR Negative 06/14/2025    THCUR Negative 06/14/2025    COCAINEUR Negative 06/14/2025    METHADONEUR Negative 06/14/2025    OPIATEUR Negative 06/14/2025    PCPUR Negative 06/14/2025     Counseling / Coordination of Care:    Patient's progress discussed with staff in treatment team meeting.  Patient's progress reviewed with nursing staff.  Medications, treatment " progress and treatment plan reviewed with patient.  Medication education provided to patient.  Patient's progress reviewed with case management staff.  Patient's diagnosis and treatment indicated reviewed with patient.  Importance of medication and treatment compliance reviewed with patient.  Educated on importance of medication and treatment compliance.  Discussed with patient acceptance of mental illness diagnosis and need for ongoing treatment after discharge.  Cognitive techniques utilized during the session.  Reassurance and supportive therapy provided.  Reoriented to reality and reassured.  Group attendance encouraged.  Encouraged participation in milieu and group therapy on the unit.  Crisis/safety plan discussed with patient.      ALPHONSO Granados 06/24/25

## 2025-06-24 NOTE — PROGRESS NOTES
06/23/25 0900   Team Meeting   Meeting Type Daily Rounds   Team Members Present   Team Members Present Physician;Nurse;;Occupational Therapist   Physician Team Member MD Sukhwinder, ALPHONSO Jauregui Gunay, MD   Nursing Team Member SHIRA Penaloza   Care Management Team Member MS Kevin   OT Team Member JUDI Key   Patient/Family Present   Patient Present No   Patient's Family Present No   302. Moo. Med compliant. Less religiously preoccupied. Withdrawn to self. Inappropriate smile at times. Eating.sleeping well. Denies all. D/C date not known due to med adjustment.

## 2025-06-24 NOTE — PROGRESS NOTES
06/24/25 1700   Team Meeting   Meeting Type Daily Rounds   Team Members Present   Team Members Present Physician;Nurse;;Other (Discipline and Name)   Physician Team Member Clara/Sukhwinder   Nursing Team Member Ca   Care Management Team Member Kevin   Social Work Team Member /Kelly   Other (Discipline and Name) Medei   Patient/Family Present   Patient Present No   Patient's Family Present No     Signed 201 and then signed 72 hour notice 6/23/25 4258  +Overt Latter day Delusions. Preoccupied with same  Need collateral from family regarding baseline

## 2025-06-24 NOTE — PROGRESS NOTES
"Progress Note - Jah Morejon Jr. 24 y.o. male MRN: [id number]    Unit/Bed#: [facility] 249-01 Encounter: [id number]        Subjective:   Patient seen and examined at bedside after reviewing the chart and discussing the case with the caring staff.      Patient examined at bedside.  Patient denies any acute symptoms.     Physical Exam   Vitals: Blood pressure 119/61, pulse 82, temperature 97.6 °F (36.4 °C), temperature source Temporal, resp. rate 18, height 5' 9\" (1.753 m), weight 70.1 kg (154 lb 9.6 oz), SpO2 95%.,Body mass index is 22.83 kg/m².  Constitutional: Patient in no acute distress.  HEENT: PERR, EOMI, MMM.  Cardiovascular: Normal rate and regular rhythm.    Pulmonary/Chest: Effort normal and breath sounds normal.   Abdomen: Soft, + BS, NT.    Assessment/Plan:  Jah Morejon Jr. is a(n) 24 y.o. male with MDD.     Arthralgias/HA.  Tylenol as needed.   Insomnia.  Patient on melatonin 3 mg at bedtime.   Vitamin D deficiency.  Level 13.7 on 6/17.  Start Vit D2 50,000 units weekly x 10 weeks followed by Vit D3 1,000 units daily.   "

## 2025-06-24 NOTE — PLAN OF CARE
Problem: Alteration in Thoughts and Perception  Goal: Agree to be compliant with medication regime, as prescribed and report medication side effects  Description: Interventions:  - Offer appropriate PRN medication and supervise ingestion; conduct AIMS, as needed   Outcome: Progressing     Problem: Alteration in Thoughts and Perception  Goal: Agree to be compliant with medication regime, as prescribed and report medication side effects  Description: Interventions:  - Offer appropriate PRN medication and supervise ingestion; conduct AIMS, as needed   Outcome: Progressing     Problem: Risk for Self Injury/Neglect  Goal: Treatment Goal: Remain safe during length of stay, learn and adopt new coping skills, and be free of self-injurious ideation, impulses and acts at the time of discharge  Outcome: Progressing  Goal: Refrain from harming self  Description: Interventions:  - Monitor patient closely, per order  - Develop a trusting relationship  - Supervise medication ingestion, monitor effects and side effects   Outcome: Progressing   See chart note

## 2025-06-24 NOTE — NURSING NOTE
Pt calm and cooperative with assessment. Denies SI/HI/AH/VH and pain. Compliant with meds. Socializes with peers and staff. Was laying hands on peers and praying for them, educated not to be touching other pts. Safety checks ongoing.

## 2025-06-25 PROCEDURE — 99232 SBSQ HOSP IP/OBS MODERATE 35: CPT | Performed by: PSYCHIATRY & NEUROLOGY

## 2025-06-25 RX ORDER — RISPERIDONE 3 MG/1
3 TABLET ORAL 2 TIMES DAILY
Qty: 60 TABLET | Refills: 1 | Status: SHIPPED | OUTPATIENT
Start: 2025-06-25 | End: 2025-06-26

## 2025-06-25 RX ADMIN — RISPERIDONE 3 MG: 2 TABLET, ORALLY DISINTEGRATING ORAL at 17:06

## 2025-06-25 RX ADMIN — RISPERIDONE 3 MG: 2 TABLET, ORALLY DISINTEGRATING ORAL at 09:05

## 2025-06-25 NOTE — NURSING NOTE
Patient intermittently visible, selectively social. Brightens upon approach. Denies SI/HI/AVH, depression and anxiety. Unable to swallow pills, not able to take Vitamin D. Provider notified. Continuous visual safety checks performed throughout the shift. All safety precautions maintained.

## 2025-06-25 NOTE — PROGRESS NOTES
"Progress Note - Jah Morejon Jr. 24 y.o. male MRN: [id number]    Unit/Bed#: [facility] 249-01 Encounter: [id number]        Subjective:   Patient seen and examined at bedside after reviewing the chart and discussing the case with the caring staff.      Patient examined at bedside.  Patient denies any acute symptoms.     Physical Exam   Vitals: Blood pressure 107/60, pulse 81, temperature 97.5 °F (36.4 °C), temperature source Temporal, resp. rate 18, height 5' 9\" (1.753 m), weight 70.1 kg (154 lb 9.6 oz), SpO2 95%.,Body mass index is 22.83 kg/m².  Constitutional: Patient in no acute distress.  HEENT: PERR, EOMI, MMM.  Cardiovascular: Normal rate and regular rhythm.    Pulmonary/Chest: Effort normal and breath sounds normal.   Abdomen: Soft, + BS, NT.    Assessment/Plan:  Jah Morejon Jr. is a(n) 24 y.o. male with MDD.     Arthralgias/HA.  Tylenol as needed.   Insomnia.  Patient on melatonin 3 mg at bedtime.   Vitamin D deficiency.  Level 13.7 on 6/17.  D/c supplement as patient is unable to swallow any pills.     The patient was discussed with Dr. Zambrano and he is in agreement with the above note.    "

## 2025-06-25 NOTE — NURSING NOTE
Patient visible on the unit. Social with select peers. Pleasant and cooperative. Refused Melatonin. Denies SI,HI,AVH. Safety checks continue Q 15 minutes.

## 2025-06-25 NOTE — SOCIAL WORK
CM left a message for pt's father Jah [phone] to discuss his son and how he feels that he is doing and discharge planning.

## 2025-06-25 NOTE — SOCIAL WORK
AUSTIN spoke with Yara at [facility] [phone] with pt to schedule his medication management appointment. AUSTIN was able to secure him an appointment for 7-10-25 at 2:00pm with Dr. Maciel in person for medication management.

## 2025-06-25 NOTE — PLAN OF CARE
Problem: Depression  Goal: Verbalize thoughts and feelings  Description: Interventions:  - Assess and re-assess patient's level of risk   - Engage patient in 1:1 interactions, daily, for a minimum of 15 minutes   - Encourage patient to express feelings, fears, frustrations, hopes   Outcome: Progressing     Problem: Ineffective Coping  Goal: Participates in unit activities  Description: Interventions:  - Provide therapeutic environment   - Provide required programming   - Redirect inappropriate behaviors   Outcome: Progressing

## 2025-06-25 NOTE — PLAN OF CARE
Problem: Ineffective Coping  Goal: Participates in unit activities  Description: Interventions:  - Provide therapeutic environment   - Provide required programming   - Redirect inappropriate behaviors   Outcome: Not Progressing     Problem: Alteration in Thoughts and Perception  Goal: Attend and participate in unit activities, including therapeutic, recreational, and educational groups  Description: Interventions:  -Encourage Visitation and family involvement in care  Outcome: Not Progressing     Problem: Risk for Self Injury/Neglect  Goal: Attend and participate in unit activities, including therapeutic, recreational, and educational groups  Description: Interventions:  - Provide therapeutic and educational activities daily, encourage attendance and participation, and document same in the medical record  - Obtain collateral information, encourage visitation and family involvement in care   Outcome: Not Progressing     Problem: Risk for Violence/Aggression Toward Others  Goal: Attend and participate in unit activities, including therapeutic, recreational, and educational groups  Description: Interventions:  - Provide therapeutic and educational activities daily, encourage attendance and participation, and document same in the medical record   Outcome: Not Progressing

## 2025-06-25 NOTE — PROGRESS NOTES
"Progress Note - Behavioral Health   Name: Jah Morejon Jr. 24 y.o. male I MRN: [id number]  Unit/Bed#: U 249-01 I Date of Admission: 6/14/2025   Date of Service: 6/25/2025 I Hospital Day: 11        Assessment & Plan  Psychosis (HCC)  Continue Risperdal m- tab 3 mg po BID for psychosis; increased 6/19/2025    Risks / Benefits of Treatment:  Risks, benefits, and possible side effects of medications explained to patient. Patient has limited understanding of risks and benefits of treatment at this time, but agrees to take medications as prescribed.      Progress Toward Goals: Slowly improving.  Maintaining safety and mood control with no agitated or aggressive outburst.  Lutheran preoccupation less overt; responds well to redirection.  Less intrusive with peers.  Patient signed a 72-hour notice 6/23/2025 at 1448.  Continue with psychotropic regimen.  Will require collateral from patient's family regarding baseline and progress over the phone.  Continuing to observe for safety throughout duration of 72-hour notice, however should patient continue to sustain safety and improvement with no criteria to pursue involuntary admission, plan would be to discharge upon expiration of 72-hour notice 6/26/2025.      Subjective:    Jah is intermittently visible in the milieu and selectively social with peers.  Maintaining ADLs.  No longer mood is controlled with no agitation, irritability, or aggressive outbursts.  Feels he is \"so much better\" than when he initially came into the hospital.  Compliant with oral medications.  Denies SI/HI.  Reports resolution of AVH, nor did patient appear preoccupied.  Lutheran preoccupation less overt.  Maintaining boundaries with peers.  States he has been speaking with his dad regularly on the phone and conversations have been \"good.\"    Behavior over the last 24 hours: slowly improving.   Sleep: normal  Appetite: normal  Medication side effects: No   ROS: no complaints, all other systems are " "negative    Objective :  Temp:  [97.5 °F (36.4 °C)] 97.5 °F (36.4 °C)  HR:  [81-93] 81  BP: (107-116)/(60-73) 107/60  Resp:  [18] 18  SpO2:  [95 %] 95 %  O2 Device: None (Room air)    Mental Status Evaluation:  Appearance:  Thin  male, blond hair, bearded, wearing paper scrubs   Behavior:  Cooperative, calm, good eye contact   Speech:  Normal rate/volume   Mood:  \"So much better\"   Affect:  Constricted   Thought Process:  Goal directed   Associations: Circumstantial   Thought Content:  Church preoccupation less overt   Perceptual Disturbances: Denied AVH, did not appear internally preoccupied   Risk Potential: Suicidal ideation - None  Homicidal ideation - None  Potential for aggression - Not at present   Sensorium:  oriented to person, place, and time/date   Memory:  recent and remote memory grossly intact   Consciousness:  alert and awake   Attention/Concentration: attention span and concentration appear shorter than expected for age   Insight:  limited   Judgment: limited   Gait/Station: normal gait/station, normal balance   Motor Activity: no abnormal movements       Lab Results: I have reviewed the following results:  CMP:   Lab Results   Component Value Date    SODIUM 140 06/17/2025    K 3.7 06/17/2025     06/17/2025    CO2 27 06/17/2025    AGAP 8 06/17/2025    BUN 13 06/17/2025    CREATININE 1.00 06/17/2025    GLUC 95 06/17/2025    GLUF 89 01/20/2025    CALCIUM 9.2 06/17/2025    AST 26 06/17/2025    ALT 11 06/17/2025    ALKPHOS 40 06/17/2025    TP 7.4 06/17/2025    ALB 4.6 06/17/2025    TBILI 0.63 06/17/2025    EGFR 105 06/17/2025     Drug Screen:   Lab Results   Component Value Date    AMPMETHUR Negative 06/14/2025    BARBTUR Negative 06/14/2025    BDZUR Negative 06/14/2025    THCUR Negative 06/14/2025    COCAINEUR Negative 06/14/2025    METHADONEUR Negative 06/14/2025    OPIATEUR Negative 06/14/2025    PCPUR Negative 06/14/2025     Counseling / Coordination of Care:    Patient's progress " discussed with staff in treatment team meeting.  Patient's progress reviewed with nursing staff.  Medications, treatment progress and treatment plan reviewed with patient.  Medication education provided to patient.  Patient's progress reviewed with case management staff.  Patient's diagnosis and treatment indicated reviewed with patient.  Importance of medication and treatment compliance reviewed with patient.  Educated on importance of medication and treatment compliance.  Discussed with patient acceptance of mental illness diagnosis and need for ongoing treatment after discharge.  Cognitive techniques utilized during the session.  Reassurance and supportive therapy provided.  Reoriented to reality and reassured.  Group attendance encouraged.  Encouraged participation in milieu and group therapy on the unit.  Crisis/safety plan discussed with patient.      ALPHONSO Granados 06/25/25

## 2025-06-25 NOTE — PROGRESS NOTES
06/25/25 0811   Team Meeting   Meeting Type Daily Rounds   Team Members Present   Team Members Present Physician;Nurse;;Occupational Therapist   Physician Team Member MD Sukhwinder; ALPHONSO Tavarez Gunay, MD   Nursing Team Member SHIRA Penaloza   Care Management Team Member MS Kevin   OT Team Member JUDI Key LPC   Patient/Family Present   Patient Present No   Patient's Family Present No

## 2025-06-25 NOTE — SOCIAL WORK
CM spoke with pt's father Jah [phone] regarding discharge. He reported that he does not have any safety concerns and pt is able to return to his home. Pt's father stated that his son is not one to go out to hang out with his friends and lacks social interaction skills. Father reported that he is more social when it comes to Mu-ism settings is very focused on his jona and that is normal for him. Pt's father stated that pt only completed 8th grade but did get his GED. He reported that his previous mental health provider with [facility] in [place] and that they use the [facility] on [facility] in [place]. Pt's reported that his mother would be the one to  her son around 1:30 -2:00 pm.

## 2025-06-25 NOTE — NURSING NOTE
Patient observed sleeping during 15 minute checks. No distress noted. Non labored breathing. Safety checks continue.

## 2025-06-25 NOTE — PROGRESS NOTES
Met with Jah completed his relapse prevention. He was able to identify his protective factors, warning signs, stressors, coping skills and support people. We reviewed the community supports.

## 2025-06-25 NOTE — DISCHARGE INSTR - OTHER ORDERS
You are being discharged to your home at [address] [place] [address] TELEPHONE [phone]    Triggers you have identified during your hospitalization that led to your admission of a regressed mood include ineffective coping skills. Coping skills you have identified during your hospitalization include listening to music and watching Tv. If you are unable to deal with your distressed mood alone please tell Osmin and ask for assistance. If that is not effective and you continue to have (ex: suicidal ideation, homicidal ideation, distressed mood, overwhelmed, in crisis, please contact Crisis by dialing 359, call [facility] Crisis Hotline: 765.912.3513, dial 593 or go to the nearest emergency center.        *[facility] Crisis Hotline: 777.342.6735  *[place] Drug and Alcohol Commission: 902.981.1411   *[place] Alcohol Anonymous: 448.880.5152  *National Suicide Prevention Lifeline:  1-229.317.6685  *National [place] on Mental Illness (LIAM) HELPLINE: 697.792.5887/Website: www.liam.org  *Substance Abuse and Mental Health Services Administration([facility]) National Helpline, which is a confidential, free, 24-hour-a-day, 365-day-a-year, information service for individuals and family members facing mental health and/or substance use disorders. This service provides referrals to local treatment facilities, support groups, and community-based organizations. Callers can also order free publications and other information.  Call 1-937.902.5709/Website: www.[facility].gov  *[facility] 2-1-1: This is a toll free, confidential, 24-hour-a-day service which connects you to a community  in your area who can help you find services and resources that are available to you locally and provide critical services that can improve and save lives.  Call: 211  /Website: http://www.[employer]/       Laura, or Marleen, our Behavioral Health Nurse Navigators, will be calling you after your discharge, on  the phone number that you provided.  They will be available as an additional support, if needed.   If you wish to speak with one of them, you may contact Laura at [phone] or Marleen at [phone]

## 2025-06-26 VITALS
WEIGHT: 154.6 LBS | RESPIRATION RATE: 18 BRPM | OXYGEN SATURATION: 95 % | SYSTOLIC BLOOD PRESSURE: 115 MMHG | BODY MASS INDEX: 22.9 KG/M2 | TEMPERATURE: 97.8 F | HEART RATE: 73 BPM | HEIGHT: 69 IN | DIASTOLIC BLOOD PRESSURE: 61 MMHG

## 2025-06-26 PROBLEM — F29 PSYCHOSIS (HCC): Status: RESOLVED | Noted: 2025-06-15 | Resolved: 2025-06-26

## 2025-06-26 PROCEDURE — 99238 HOSP IP/OBS DSCHRG MGMT 30/<: CPT | Performed by: NURSE PRACTITIONER

## 2025-06-26 RX ORDER — RISPERIDONE 3 MG/1
3 TABLET, ORALLY DISINTEGRATING ORAL 2 TIMES DAILY
Qty: 60 TABLET | Refills: 1 | Status: SHIPPED | OUTPATIENT
Start: 2025-06-26 | End: 2025-08-25

## 2025-06-26 RX ADMIN — RISPERIDONE 3 MG: 2 TABLET, ORALLY DISINTEGRATING ORAL at 08:14

## 2025-06-26 NOTE — BH TRANSITION RECORD
Contact Information: If you have any questions, concerns, pended studies, tests and/or procedures, or emergencies regarding your inpatient behavioral health visit. Please contact [facility] # [phone] and ask to speak to a , nurse or physician. A contact is available 24 hours/ 7 days a week at this number.     Summary of Procedures Performed During your Stay:  Below is a list of major procedures performed during your hospital stay and a summary of results:  - No major procedures performed.    Pending Studies (From admission, onward)      None          Please follow up on the above pending studies with your PCP and/or referring provider.

## 2025-06-26 NOTE — DISCHARGE SUMMARY
"Discharge Summary - Behavioral Health   Name: Jah Morejon Jr. 24 y.o. male I MRN: [id number]  Unit/Bed#: -01 I Date of Admission: 6/14/2025   Date of Service: 6/26/2025 I Hospital Day: 12    Assessment & Plan  Psychosis (HCC)  Continue Risperdal 3 mg PO BID upon discharge     Admission Date: 6/14/2025         Discharge Date: 6/26/2025    Attending Psychiatrist: Siobhan Beltran MD    Reason for Admission/HPI: Major depressive disorder, single episode, unspecified [F32.9]    According to H&P by Dr. Hernández 6/15/25:    History of Present Illness  This is a comprehensive psychiatric evaluation for the patient Jah Morejon Jr., who is being admitted to Psychiatric hospital on a voluntary 201 commitment basis.  Symptoms prior to admission include prominent symptoms of psychosis, with both visual and auditory hallucinations, with command auditory hallucinations telling him to harm himself.  Onset of symptoms is unclear, however appears to have progressively worsened over the past 3 months.  This appears to be in the setting of medication nonadherence.  Per chart review, Jah is a 24-year-old male, single, unemployed, living with his father in [facility].  Per chart review, Jah has a past psychiatric history of episodic tension type headache.  Per chart review, Jah has a past psychiatric history of major depressive disorder with psychotic features.     The following italicized information is copied from the ED crisis evaluation performed 6/14/2025  Patient brought to the ED via his parents with complaints of A/H , Patient reports \"The voices are mocking him\" they are making fun of me\" patient reporst that worsening A/H for the past week,Patient reports that the statement about going to be with god are his intrusive thoughts that he has been having for a while. patient reports that he recently quit his job due to his intrusive thoughts and depression Per Patient parents patient has been " "decompinsating since patient stop taking his medication. Patient reports that he stop taking his medication 1 month post D/C from last inpatient admission. Patient presents with flat affect, pateint is responding to internal stimuli, during assessment patient smiles at times, Patient denies S/I,H/I,V/H  Intake Assessment completed, Safety risk Assessment completed. CIS met with patient and discussed the process of a U admission, patient has agreed and signed a 201. CIS discussd this case and patient plan with ED Physician who is in agreement with this plan. CIS will start bed search and complete the Pre-Cert      Symptoms prior to admission include prominent symptoms of psychosis, with both visual and auditory hallucinations, with command auditory hallucinations telling him to harm himself.  Onset of symptoms is unclear, however appears to have progressively worsened over the past 3 months.  This appears to be in the setting of medication nonadherence.      At the time of interview, Jah appeared acutely psychotic.  He was uncooperative with interview.  He was not willing to answer questions regarding his past psychiatric history, current psychiatric symptoms, or the events that brought him into the hospital.  At the time of interview Jah was uncooperative in terms of behaviors, with psychomotor agitation, irritable.  He was observed responding to internal stimuli and grasping of the air.  His speech was scant.  He was heard making brief, one-word exclamation's (yelling loudly \"alimighty!\").  He was physically aggressive and swatted multiple times at this writer.     At the time of interview, Jah stated he was here because of \"intrusive thoughts.\"  He then stated \"the voices make fun of me.\"  He continued to avoid further conversation, stating \"not now, maybe later.\"  At the time of interview, he denied suicidal ideations and homicidal ideations.     Patient reports experiencing both visual and auditory " "hallucinations.  He states \"the voices make fun of me.\"  He states that these voices have commanded him to end his life.  He appears responding to internal stimuli and is seen grasping at the air.     This writer disengaged from the interview in this context.  Approximately 5 minutes later, Jah was observed by staff continuing to demonstrate physically aggressive behavior and was also swatting at patients on the unit.  Security was called.  Jah was given IM injection of Haldol 5 mg, 2 mg of Ativan, and 1 mg of Cogentin.    Hospital Course: The patient was admitted to the inpatient psychiatric unit and started on every 15 minutes precautions. During the hospitalization the patient was attending individual therapy, group therapy, milieu therapy and occupational therapy.    Psychiatric medications were titrated over the hospital stay to address depression, depressive symptoms, agitation, psychotic symptoms, paranoid ideation, auditory hallucinations, visual hallucinations, and that occasion with complaints, and suicidal ideation. Jah was started on antipsychotic medication Risperdal. Medication doses were titrated during the hospital course. Prior to beginning of treatment medications risks and benefits and possible side effects including risk of parkinsonian symptoms, Tardive Dyskinesia and metabolic syndrome related to treatment with antipsychotic medications and risk of cardiovascular events in elderly related to treatment with antipsychotic medications were reviewed with the patient.  Jah did not verbalizes understanding of risks and benefits of treatment and required ongoing education.  Medication education remained ongoing throughout inpatient stay.    Upon admission, Jah was agitated, irritable, internally preoccupied, and guarded.  He was experiencing overt psychosis with agitation and required PRN IM medications.  Patient was initiated on Risperdal for treatment of psychosis, agitation, mood control, " however refused requiring transition to 302 involuntary admission with medication over objection put in place for treatment of psychosis.  Risperdal was added and titrated to 3 mg twice daily with improvement of symptoms.  Patient was no longer agitated, aggressive, or actively responding/yelling out to internal stimuli.  Sleep and self-care were improving.  Patient was receptive and medication education and agreed to take medication orally by mouth no longer requiring IM as a part of refusal.     With improving symptoms, patient was offered option to transition back to voluntary admission.  With this, he did sign a 72-hour notice and requested discharge.  Throughout observation of 72-hour notice, Jah endorsed intermittent Anabaptism preoccupation but was not grossly overt with it and redirected well.  He was able to maintain safety and mood control with no agitated or aggressive outburst.  He continued to report resolution of auditory and visual hallucinations.  He also reported improved depression and resolution of suicidal thoughts; patient presented with congruent affect.  His thoughts were organized, linear, and goal directed with ability to reality test.  He was receptive to medication education and the importance of maintaining compliance in the outpatient setting to prevent relapse of psychotic symptoms.  Patient did state that his noncompliance was most likely a contributor to psychiatric decompensation.     Jah signed 72 hour notice and requested discharge. At the time of the 72 hour notice expiration he had no criteria for involuntary commitment and denied any suicidal or homicidal ideation.    Prior to discharge, collateral was obtained from  by Jah's father who reported patient sound improved and at baseline over the phone; expressed no safety concerns. Patient's father also added that Jah is a Anabaptism individual and very active with the Muslim outside of the hospital.  Agreeable to  "have patient return home upon discharge.    Jah was also able to identify an adequate safety plan to utilize should he become a danger to himself, others, or experience of mental health crisis.  This plan includes talking to his dad, utilizing crisis hotline, contacting [facility], or returning to the hospital.  He also identified his family and jona of strong protective factors against suicide.  Forward thinking with plan to maintain medication compliance, attend outpatient appointments, and look for a new job.    The outpatient follow up with [facility] for psychiatric medication management 7/10/2025 at 2 PM was arranged by the unit  upon discharge.  A 30-day supply of psychotropic medication was submitted to your pharmacy prior to discharge.    Jah was stabilized and discharged on the following psychotropic regimen: Risperdal 3 mg PO BID. He was tolerating medications well and denied any side effects on day of discharge.    Mental Status at time of Discharge:   Appearance:  age appropriate, bearded, and wearing paper scrubs, blond hair   Behavior:  Cooperative, calm, good eye contact   Speech:  normal pitch and normal volume   Mood:  \"Good\"   Affect:  Constricted   Thought Process:  Linear, forward thing   Thought Content:  No overt delusions or paranoia verbalized; able to reality test   Perceptual Disturbances: Denied AVH, did not appear internally preoccupied   Risk Potential: Suicidal Ideations none, Homicidal Ideations none, and Potential for Aggression No   Sensorium:  person, place, time/date, and situation   Cognition:  recent and remote memory grossly intact   Consciousness:  alert and awake    Attention: attention span and concentration were age appropriate   Insight:  limited   Judgment: limited   Gait/Station: normal gait/station and normal balance   Motor Activity: no abnormal movements     Admission Diagnosis:Major depressive disorder, single episode, unspecified " [F32.9]    Discharge Diagnosis:   Principal Problem:    Psychosis (HCC)  Resolved Problems:    * No resolved hospital problems. *    Lab results:  Admission on 06/14/2025   Component Date Value    Sodium 06/17/2025 140     Potassium 06/17/2025 3.7     Chloride 06/17/2025 105     CO2 06/17/2025 27     ANION GAP 06/17/2025 8     BUN 06/17/2025 13     Creatinine 06/17/2025 1.00     Glucose 06/17/2025 95     Calcium 06/17/2025 9.2     AST 06/17/2025 26     ALT 06/17/2025 11     Alkaline Phosphatase 06/17/2025 40     Total Protein 06/17/2025 7.4     Albumin 06/17/2025 4.6     Total Bilirubin 06/17/2025 0.63     eGFR 06/17/2025 105     WBC 06/17/2025 6.80     RBC 06/17/2025 4.89     Hemoglobin 06/17/2025 15.1     Hematocrit 06/17/2025 46.7     MCV 06/17/2025 96     MCH 06/17/2025 30.9     MCHC 06/17/2025 32.3     RDW 06/17/2025 10.5 (L)     MPV 06/17/2025 9.2     Platelets 06/17/2025 285     nRBC 06/17/2025 0     Segmented % 06/17/2025 57     Immature Grans % 06/17/2025 0     Lymphocytes % 06/17/2025 30     Monocytes % 06/17/2025 9     Eosinophils Relative 06/17/2025 3     Basophils Relative 06/17/2025 1     Absolute Neutrophils 06/17/2025 3.93     Absolute Immature Grans 06/17/2025 0.02     Absolute Lymphocytes 06/17/2025 2.01     Absolute Monocytes 06/17/2025 0.58     Eosinophils Absolute 06/17/2025 0.20     Basophils Absolute 06/17/2025 0.06     TSH 3RD GENERATION 06/17/2025 3.122     Vitamin B-12 06/17/2025 493     Folate 06/17/2025 >22.3     Vit D, 25-Hydroxy 06/17/2025 13.7 (L)     Cholesterol 06/17/2025 148     Triglycerides 06/17/2025 120     HDL, Direct 06/17/2025 45     LDL Calculated 06/17/2025 79     Non-HDL-Chol (CHOL-HDL) 06/17/2025 103     Treponema Pallidium Tota* 06/17/2025 Non-reactive     Ventricular Rate 06/16/2025 91     Atrial Rate 06/16/2025 91     IL Interval 06/16/2025 132     QRSD Interval 06/16/2025 74     QT Interval 06/16/2025 348     QTC Interval 06/16/2025 429     P Axis 06/16/2025 79      QRS Axis 06/16/2025 89     T Wave Axis 06/16/2025 73        Discharge Medications:  Current Discharge Medication List        CONTINUE these medications which have CHANGED    Details   risperiDONE (RisperDAL) 3 mg tablet Take 1 tablet (3 mg total) by mouth in the morning and 1 tablet (3 mg total) before bedtime.  Qty: 60 tablet, Refills: 1    Associated Diagnoses: Psychosis (HCC)              Discharge instructions/Information to patient and family:   See after visit summary for information provided to patient and family.      Provisions for Follow-Up Care:  See after visit summary for information related to follow-up care and any pertinent home health orders.      Discharge Statement:    I spent 25 minutes discharging the patient. This time was spent on the day of discharge. I had direct contact with the patient on the day of discharge.     I reviewed with Jah importance of compliance with medications and outpatient treatment after discharge.  I discussed the medication regimen and possible side effects of the medications with Jah prior to discharge. At the time of discharge he was tolerating psychiatric medications.  I discussed outpatient follow up with Jah.  I reviewed with Jah crisis plan and safety plan upon discharge.  Jah agreed to abstain from drug and alcohol use after discharge.  Jah signed 72 hour notice and requested discharge. At the time of the 72 hour notice expiration he had no criteria for involuntary commitment and denied any suicidal or homicidal ideation.    ALPHONSO Granados 06/26/25

## 2025-06-26 NOTE — PROGRESS NOTES
"Progress Note - Jah Morejon Jr. 24 y.o. male MRN: [id number]    Unit/Bed#: [facility] 249-01 Encounter: [id number]        Subjective:   Patient seen and examined at bedside after reviewing the chart and discussing the case with the caring staff.      Patient examined at bedside.  Patient denies any acute symptoms.      Patient is being discharged today, 6/26/25.     Physical Exam   Vitals: Blood pressure 115/61, pulse 73, temperature 97.8 °F (36.6 °C), temperature source Temporal, resp. rate 18, height 5' 9\" (1.753 m), weight 70.1 kg (154 lb 9.6 oz), SpO2 95%.,Body mass index is 22.83 kg/m².  Constitutional: Patient in no acute distress.  HEENT: PERR, EOMI, MMM.  Cardiovascular: Normal rate and regular rhythm.    Pulmonary/Chest: Effort normal and breath sounds normal.   Abdomen: Soft, + BS, NT.    Assessment/Plan:  Jah Morejon Jr. is a(n) 24 y.o. male with MDD.     Medical Clearance: Patient is medically cleared for discharge. All prescriptions have been sent to pharmacy.     Arthralgias/HA.  Tylenol as needed.   Insomnia.  Patient on melatonin 3 mg at bedtime.   Vitamin D deficiency.  Level 13.7 on 6/17.  D/c supplement as patient is unable to swallow any pills.     The patient was discussed with Dr. Zambrano and he is in agreement with the above note.    "

## 2025-06-26 NOTE — PLAN OF CARE
Problem: Risk for Self Injury/Neglect  Goal: Verbalize thoughts and feelings  Description: Interventions:  - Assess and re-assess patient's lethality and potential for self-injury  - Engage patient in 1:1 interactions, daily, for a minimum of 15 minutes  - Encourage patient to express feelings, fears, frustrations, hopes  - Establish rapport/trust with patient   Outcome: Progressing     Problem: Risk for Violence/Aggression Toward Others  Goal: Control angry outbursts  Description: Interventions:  - Monitor patient closely, per order  - Ensure early verbal de-escalation  - Monitor prn medication needs  - Set reasonable/therapeutic limits, outline behavioral expectations, and consequences   - Provide a non-threatening milieu, utilizing the least restrictive interventions   Outcome: Progressing      Refill requested

## 2025-06-26 NOTE — NURSING NOTE
Patient discharged unit walking with all belongings. Reviewed AVS with patient, patient verbalized understanding.